# Patient Record
Sex: FEMALE | Race: WHITE | HISPANIC OR LATINO | Employment: FULL TIME | URBAN - METROPOLITAN AREA
[De-identification: names, ages, dates, MRNs, and addresses within clinical notes are randomized per-mention and may not be internally consistent; named-entity substitution may affect disease eponyms.]

---

## 2017-06-23 ENCOUNTER — INITIAL PRENATAL (OUTPATIENT)
Dept: OBSTETRICS AND GYNECOLOGY | Facility: CLINIC | Age: 22
End: 2017-06-23

## 2017-06-23 VITALS
BODY MASS INDEX: 41.15 KG/M2 | SYSTOLIC BLOOD PRESSURE: 98 MMHG | DIASTOLIC BLOOD PRESSURE: 63 MMHG | WEIGHT: 247 LBS | HEIGHT: 65 IN

## 2017-06-23 DIAGNOSIS — Z34.90 EARLY STAGE OF PREGNANCY: Primary | ICD-10-CM

## 2017-06-23 PROBLEM — Z86.19 HISTORY OF HERPES GENITALIS: Status: ACTIVE | Noted: 2017-06-23

## 2017-06-23 PROBLEM — O09.299 PREVIOUS STILLBIRTH OR DEMISE, ANTEPARTUM: Status: ACTIVE | Noted: 2017-06-23

## 2017-06-23 PROBLEM — O99.210 OBESITY COMPLICATING PREGNANCY: Status: ACTIVE | Noted: 2017-06-23

## 2017-06-23 PROCEDURE — 99203 OFFICE O/P NEW LOW 30 MIN: CPT | Performed by: OBSTETRICS & GYNECOLOGY

## 2017-06-23 RX ORDER — VITAMIN A ACETATE, BETA CAROTENE, ASCORBIC ACID, CHOLECALCIFEROL, .ALPHA.-TOCOPHEROL ACETATE, DL-, THIAMINE MONONITRATE, RIBOFLAVIN, NIACINAMIDE, PYRIDOXINE HYDROCHLORIDE, FOLIC ACID, CYANOCOBALAMIN, CALCIUM CARBONATE, FERROUS FUMARATE, ZINC OXIDE, CUPRIC OXIDE 3080; 12; 120; 400; 1; 1.84; 3; 20; 22; 920; 25; 200; 27; 10; 2 [IU]/1; UG/1; MG/1; [IU]/1; MG/1; MG/1; MG/1; MG/1; MG/1; [IU]/1; MG/1; MG/1; MG/1; MG/1; MG/1
TABLET, FILM COATED ORAL
COMMUNITY
Start: 2017-03-31 | End: 2018-02-19 | Stop reason: SDUPTHER

## 2017-06-23 NOTE — PROGRESS NOTES
CC:  Initial OB visit  Patient presents for initial OB visit.  History reviewed with her.  She has had 2 prior vaginal deliveries at term and denies any complications with those pregnancies.  Her most recent pregnancy resulted in a still birth in March 2017.  Patient is unsure exactly how far along she was but thinks that she was 25-26 weeks when the stillbirth was diagnosed.  She states that on an earlier ultrasound in that pregnancy they had seen blood in the fetuse's intestines.  She states that she had DNA testing for the fetus that was normal.  She was induced and had a vaginal delivery and denies any complications after that delivery.  She states that she had testing done on the placenta but never received those results.  All of her records from [facility] have been requested.  Patient has no major medical problems.  She does have a history of HSV but no recent outbreaks.  Discussed starting her on suppressive therapy at 36 weeks.  Initial OB counseling was done.  Patient counseled on hospital, call system, and diet during pregnancy.  Discussed cystic fibrosis screening and she is undecided.  A/P:  Supervision of early pregnancy  Previous stillbirth  --Labwork today  --Records from  requested  --Pap smear today  --Dating u/s next week  --F/U in 4 weeks  Counseling was given to patient for the following topics: instructions for management, risk factor reductions, prognosis, patient and family education and importance of treatment compliance . Total time of the encounter was 30 minutes and 20 minutes was spend counseling.

## 2017-06-24 LAB
ABO GROUP BLD: (no result)
AMPHETAMINES UR QL SCN: NEGATIVE NG/ML
BARBITURATES UR QL SCN: NEGATIVE NG/ML
BASOPHILS # BLD AUTO: 0 X10E3/UL (ref 0–0.2)
BASOPHILS NFR BLD AUTO: 0 %
BENZODIAZ UR QL: NEGATIVE NG/ML
BLD GP AB SCN SERPL QL: NEGATIVE
BZE UR QL: NEGATIVE NG/ML
CANNABINOIDS UR QL SCN: NEGATIVE NG/ML
EOSINOPHIL # BLD AUTO: 0.1 X10E3/UL (ref 0–0.4)
EOSINOPHIL NFR BLD AUTO: 2 %
ERYTHROCYTE [DISTWIDTH] IN BLOOD BY AUTOMATED COUNT: 13.9 % (ref 12.3–15.4)
HBV SURFACE AG SERPL QL IA: NEGATIVE
HCT VFR BLD AUTO: 36.1 % (ref 34–46.6)
HGB BLD-MCNC: 11.9 G/DL (ref 11.1–15.9)
HIV 1+2 AB+HIV1 P24 AG SERPL QL IA: NON REACTIVE
IMM GRANULOCYTES # BLD: 0 X10E3/UL (ref 0–0.1)
IMM GRANULOCYTES NFR BLD: 0 %
LYMPHOCYTES # BLD AUTO: 2.7 X10E3/UL (ref 0.7–3.1)
LYMPHOCYTES NFR BLD AUTO: 35 %
MCH RBC QN AUTO: 28.1 PG (ref 26.6–33)
MCHC RBC AUTO-ENTMCNC: 33 G/DL (ref 31.5–35.7)
MCV RBC AUTO: 85 FL (ref 79–97)
METHADONE UR QL SCN: NEGATIVE NG/ML
MONOCYTES # BLD AUTO: 0.5 X10E3/UL (ref 0.1–0.9)
MONOCYTES NFR BLD AUTO: 6 %
NEUTROPHILS # BLD AUTO: 4.4 X10E3/UL (ref 1.4–7)
NEUTROPHILS NFR BLD AUTO: 57 %
OPIATES UR QL: NEGATIVE NG/ML
PCP UR QL: NEGATIVE NG/ML
PLATELET # BLD AUTO: 271 X10E3/UL (ref 150–379)
PROPOXYPH UR QL: NEGATIVE NG/ML
RBC # BLD AUTO: 4.23 X10E6/UL (ref 3.77–5.28)
RH BLD: POSITIVE
RPR SER QL: NON REACTIVE
RUBV IGG SERPL IA-ACNC: 1.19 INDEX
WBC # BLD AUTO: 7.8 X10E3/UL (ref 3.4–10.8)

## 2017-06-25 LAB
BACTERIA UR CULT: NORMAL
BACTERIA UR CULT: NORMAL

## 2017-06-26 LAB
HGB A MFR BLD: 97.9 % (ref 94–98)
HGB A2 MFR BLD COLUMN CHROM: 2.1 % (ref 0.7–3.1)
HGB C MFR BLD: 0 %
HGB F MFR BLD: 0 % (ref 0–2)
HGB FRACT BLD-IMP: NORMAL
HGB S BLD QL SOLY: NEGATIVE
HGB S MFR BLD: 0 %

## 2017-06-27 ENCOUNTER — TELEPHONE (OUTPATIENT)
Dept: OBSTETRICS AND GYNECOLOGY | Facility: CLINIC | Age: 22
End: 2017-06-27

## 2017-06-27 ENCOUNTER — PROCEDURE VISIT (OUTPATIENT)
Dept: OBSTETRICS AND GYNECOLOGY | Facility: CLINIC | Age: 22
End: 2017-06-27

## 2017-06-27 DIAGNOSIS — Z36.89 ENCOUNTER TO ESTABLISH GESTATIONAL AGE USING ULTRASOUND: Primary | ICD-10-CM

## 2017-06-27 DIAGNOSIS — O99.211 OBESITY COMPLICATING PREGNANCY, FIRST TRIMESTER: ICD-10-CM

## 2017-06-27 PROCEDURE — 76817 TRANSVAGINAL US OBSTETRIC: CPT | Performed by: OBSTETRICS & GYNECOLOGY

## 2017-06-27 NOTE — TELEPHONE ENCOUNTER
Please let her know that I did receive them and reviewed them but her records just indicate that she had a spontaneous IUFD and no specific cause was identified.  We can discuss the results of her records at her next appointment.    ----- Message from Cynthia Reyer sent at 6/27/2017  2:55 PM EDT -----  Patient is in the office for her ultrasound. She is asking if we received the results of what happened when she lost her baby earlier this year. She said she was never told what might have happened. I explained I did see some records in the chart from [facility] & I would send you a message to see if you will review them. Thank you. # [phone]

## 2017-06-30 LAB
C TRACH RRNA CVX QL NAA+PROBE: NEGATIVE
CONV .: ABNORMAL
CYTOLOGIST CVX/VAG CYTO: ABNORMAL
CYTOLOGY CVX/VAG DOC THIN PREP: ABNORMAL
DX ICD CODE: ABNORMAL
DX ICD CODE: ABNORMAL
HIV 1 & 2 AB SER-IMP: ABNORMAL
HPV I/H RISK 4 DNA CVX QL PROBE+SIG AMP: NEGATIVE
N GONORRHOEA RRNA CVX QL NAA+PROBE: NEGATIVE
OTHER STN SPEC: ABNORMAL
PATH REPORT.FINAL DX SPEC: ABNORMAL
PATH REPORT.RELEVANT HX SPEC: ABNORMAL
PATHOLOGIST CVX/VAG CYTO: ABNORMAL
RECOM F/U CVX/VAG CYTO: ABNORMAL
STAT OF ADQ CVX/VAG CYTO-IMP: ABNORMAL

## 2017-07-24 ENCOUNTER — ROUTINE PRENATAL (OUTPATIENT)
Dept: OBSTETRICS AND GYNECOLOGY | Facility: CLINIC | Age: 22
End: 2017-07-24

## 2017-07-24 VITALS — SYSTOLIC BLOOD PRESSURE: 98 MMHG | BODY MASS INDEX: 40.6 KG/M2 | DIASTOLIC BLOOD PRESSURE: 66 MMHG | WEIGHT: 244 LBS

## 2017-07-24 DIAGNOSIS — O09.291: Primary | ICD-10-CM

## 2017-07-24 DIAGNOSIS — Z3A.10 10 WEEKS GESTATION OF PREGNANCY: ICD-10-CM

## 2017-07-24 PROCEDURE — 99213 OFFICE O/P EST LOW 20 MIN: CPT | Performed by: OBSTETRICS & GYNECOLOGY

## 2017-07-24 NOTE — PROGRESS NOTES
Pt cannot leave urine sample.  CC:  Pregnancy  Patient has no complaints today.  Reviewed prenatal lab work with her and records from [facility] with her.  Explained to her that no identifiable cause for her demise is specified in the records but it does appear she may have had a placental abruption.  Patient is requesting gender testing and explained to her that test is recommended for high risk patients and has a high rate of false positives in the low risk population.  She states that she understands this.  Discussed change in due date with her from her first trimester ultrasound and discussed Pap smear results with her.  A/P:  History of fetal demise, currently at 10 weeks  --Informaseq testing today  --Followup in 4 weeks  Counseling was given to patient for the following topics: diagnostic results, instructions for management and patient and family education . Total time of the encounter was 15 minutes and 10 minutes was spend counseling.

## 2017-07-30 LAB
# FETUSES US: NORMAL
CFDNA.FET/CFDNA.TOTAL SFR FETUS: 12.4 %
CHR X + Y ANEUP PLAS.CFDNA: NORMAL
CITATION REF LAB TEST: NORMAL
CYTOGENETICS STUDY: NORMAL
FET 13+18+21+X+Y ANEUP PLAS.CFDNA: NORMAL
FET CHR 13 TS PLAS.CFDNA QL: NORMAL
FET CHR 13 TS PLAS.CFDNA QL: NORMAL
FET CHR 18 TS PLAS.CFDNA QL: NORMAL
FET CHR 18 TS PLAS.CFDNA QL: NORMAL
FET CHR 21 TS PLAS.CFDNA QL: NORMAL
FET CHR 21 TS PLAS.CFDNA QL: NORMAL
FET CHROM X + Y ANEUP CFDNA IMP: NORMAL
GA: 10.1 WEEKS
GENETIC ALGORITHM SENSITIVITY: NORMAL %
LAB DIRECTOR NAME PROVIDER: NORMAL
Lab: NORMAL
REASON FOR REFERRAL (NARRATIVE): NORMAL
REF LAB TEST METHOD: NORMAL
SERVICE CMNT 02-IMP: NORMAL
SERVICE CMNT-IMP: NORMAL

## 2017-08-03 ENCOUNTER — TELEPHONE (OUTPATIENT)
Dept: OBSTETRICS AND GYNECOLOGY | Facility: CLINIC | Age: 22
End: 2017-08-03

## 2017-08-03 NOTE — TELEPHONE ENCOUNTER
----- Message from Ira Worley MA sent at 8/3/2017 10:58 AM EDT -----  L.m for pt/lolly  ----- Message -----     From: rIa Worley MA     Sent: 8/2/2017   8:59 AM       To: VANESSA Mckeonm for pt/lolly  ----- Message -----     From: Ira Worley MA     Sent: 8/1/2017  11:08 AM       To: Ira Worley MA L.m for pt/lolly  ----- Message -----     From: Nnamdi Quintero MD     Sent: 7/31/2017   5:00 PM       To: VANESSA Mckeon, let her know the down syndrome  Test was negative/Normal. Appears female. Thanks Dr. Quintero

## 2017-08-06 ENCOUNTER — APPOINTMENT (OUTPATIENT)
Dept: ULTRASOUND IMAGING | Facility: HOSPITAL | Age: 22
End: 2017-08-06

## 2017-08-06 ENCOUNTER — HOSPITAL ENCOUNTER (EMERGENCY)
Facility: HOSPITAL | Age: 22
Discharge: HOME | End: 2017-08-06
Attending: EMERGENCY MEDICINE | Admitting: EMERGENCY MEDICINE

## 2017-08-06 VITALS
DIASTOLIC BLOOD PRESSURE: 64 MMHG | BODY MASS INDEX: 39.99 KG/M2 | WEIGHT: 240 LBS | HEIGHT: 65 IN | RESPIRATION RATE: 16 BRPM | SYSTOLIC BLOOD PRESSURE: 108 MMHG | OXYGEN SATURATION: 97 % | HEART RATE: 75 BPM | TEMPERATURE: 98.4 F

## 2017-08-06 DIAGNOSIS — O21.9 NAUSEA AND VOMITING IN PREGNANCY: ICD-10-CM

## 2017-08-06 DIAGNOSIS — R10.2 PELVIC PAIN AFFECTING PREGNANCY: Primary | ICD-10-CM

## 2017-08-06 DIAGNOSIS — O26.899 PELVIC PAIN AFFECTING PREGNANCY: Primary | ICD-10-CM

## 2017-08-06 LAB
ANION GAP SERPL CALCULATED.3IONS-SCNC: 12.1 MMOL/L
BASOPHILS # BLD AUTO: 0.01 10*3/MM3 (ref 0–0.2)
BASOPHILS NFR BLD AUTO: 0.1 % (ref 0–1.5)
BUN BLD-MCNC: 6 MG/DL (ref 6–20)
BUN/CREAT SERPL: 9 (ref 7–25)
CALCIUM SPEC-SCNC: 9.4 MG/DL (ref 8.6–10.5)
CHLORIDE SERPL-SCNC: 103 MMOL/L (ref 98–107)
CO2 SERPL-SCNC: 21.9 MMOL/L (ref 22–29)
CREAT BLD-MCNC: 0.67 MG/DL (ref 0.57–1)
DEPRECATED RDW RBC AUTO: 48.9 FL (ref 37–54)
EOSINOPHIL # BLD AUTO: 0.08 10*3/MM3 (ref 0–0.7)
EOSINOPHIL NFR BLD AUTO: 0.8 % (ref 0.3–6.2)
ERYTHROCYTE [DISTWIDTH] IN BLOOD BY AUTOMATED COUNT: 15.1 % (ref 11.7–13)
GFR SERPL CREATININE-BSD FRML MDRD: 131 ML/MIN/1.73
GLUCOSE BLD-MCNC: 92 MG/DL (ref 65–99)
HCG INTACT+B SERPL-ACNC: NORMAL MIU/ML
HCT VFR BLD AUTO: 38.1 % (ref 35.6–45.5)
HGB BLD-MCNC: 12.3 G/DL (ref 11.9–15.5)
HOLD SPECIMEN: NORMAL
HOLD SPECIMEN: NORMAL
IMM GRANULOCYTES # BLD: 0 10*3/MM3 (ref 0–0.03)
IMM GRANULOCYTES NFR BLD: 0 % (ref 0–0.5)
LYMPHOCYTES # BLD AUTO: 2.93 10*3/MM3 (ref 0.9–4.8)
LYMPHOCYTES NFR BLD AUTO: 30.6 % (ref 19.6–45.3)
MCH RBC QN AUTO: 28.3 PG (ref 26.9–32)
MCHC RBC AUTO-ENTMCNC: 32.3 G/DL (ref 32.4–36.3)
MCV RBC AUTO: 87.8 FL (ref 80.5–98.2)
MONOCYTES # BLD AUTO: 0.58 10*3/MM3 (ref 0.2–1.2)
MONOCYTES NFR BLD AUTO: 6.1 % (ref 5–12)
NEUTROPHILS # BLD AUTO: 5.97 10*3/MM3 (ref 1.9–8.1)
NEUTROPHILS NFR BLD AUTO: 62.4 % (ref 42.7–76)
PLATELET # BLD AUTO: 243 10*3/MM3 (ref 140–500)
PMV BLD AUTO: 11.1 FL (ref 6–12)
POTASSIUM BLD-SCNC: 4 MMOL/L (ref 3.5–5.2)
RBC # BLD AUTO: 4.34 10*6/MM3 (ref 3.9–5.2)
SODIUM BLD-SCNC: 137 MMOL/L (ref 136–145)
WBC NRBC COR # BLD: 9.57 10*3/MM3 (ref 4.5–10.7)
WHOLE BLOOD HOLD SPECIMEN: NORMAL
WHOLE BLOOD HOLD SPECIMEN: NORMAL

## 2017-08-06 PROCEDURE — 99284 EMERGENCY DEPT VISIT MOD MDM: CPT

## 2017-08-06 PROCEDURE — 84702 CHORIONIC GONADOTROPIN TEST: CPT

## 2017-08-06 PROCEDURE — 76817 TRANSVAGINAL US OBSTETRIC: CPT

## 2017-08-06 PROCEDURE — 93976 VASCULAR STUDY: CPT

## 2017-08-06 PROCEDURE — 76801 OB US < 14 WKS SINGLE FETUS: CPT

## 2017-08-06 PROCEDURE — 36415 COLL VENOUS BLD VENIPUNCTURE: CPT

## 2017-08-06 PROCEDURE — 85025 COMPLETE CBC W/AUTO DIFF WBC: CPT

## 2017-08-06 PROCEDURE — 80048 BASIC METABOLIC PNL TOTAL CA: CPT

## 2017-08-06 RX ORDER — SODIUM CHLORIDE 0.9 % (FLUSH) 0.9 %
10 SYRINGE (ML) INJECTION AS NEEDED
Status: DISCONTINUED | OUTPATIENT
Start: 2017-08-06 | End: 2017-08-06 | Stop reason: HOSPADM

## 2017-08-06 RX ORDER — ONDANSETRON 4 MG/1
4 TABLET, ORALLY DISINTEGRATING ORAL ONCE
Status: COMPLETED | OUTPATIENT
Start: 2017-08-06 | End: 2017-08-06

## 2017-08-06 RX ADMIN — ONDANSETRON 4 MG: 4 TABLET, ORALLY DISINTEGRATING ORAL at 17:21

## 2017-08-06 NOTE — ED PROVIDER NOTES
EMERGENCY DEPARTMENT ENCOUNTER    CHIEF COMPLAINT  Chief Complaint: LLQ abd pain  History given by: patient   History limited by: nothing   Room Number:   PMD: Provider Not In System  OBGYN: Dr. Sims     HPI:  Pt is a 22 y.o. female who is approximately 12 weeks pregnant presents complaining of sharp and cramping LLQ abd pain intermittently for 4 days. The pain is exacerbated by lying on her left side. Pt also complains of nausea, vomiting, and weight loss, but she denies fever, chest pain, SOA, diarrhea, blood in stool, and any other sx at this time. Pt estimates that she has vomited 7 times today. She has chronic low back pain secondary to prior L5 fracture. Pt's LMP was some time in May. Her due date is 18. Her OBGYN is Dr. Godwin. There have been no complications with this pregnancy thus far.  A1.    Duration:  4 days  Timing: intermittent   Location: LLQ abd  Radiation: does not radiate   Quality: sharp and cramping   Intensity/Severity: moderate   Progression: unchanged   Associated Symptoms: nausea, vomiting, weight loss  Aggravating Factors: lying on left side   Alleviating Factors: none specified   Previous Episodes: none specified   Treatment before arrival: none specified     PAST MEDICAL HISTORY  Active Ambulatory Problems     Diagnosis Date Noted   • Obesity complicating pregnancy 2017   • History of herpes genitalis 2017   • Previous stillbirth or demise, antepartum 2017     Resolved Ambulatory Problems     Diagnosis Date Noted   • No Resolved Ambulatory Problems     Past Medical History:   Diagnosis Date   • Asthma    • Herpes simplex        PAST SURGICAL HISTORY  History reviewed. No pertinent surgical history.    FAMILY HISTORY  History reviewed. No pertinent family history.    SOCIAL HISTORY  Social History     Social History   • Marital status: Single     Spouse name: N/A   • Number of children: N/A   • Years of education: N/A     Occupational History   • Not on  file.     Social History Main Topics   • Smoking status: Never Smoker   • Smokeless tobacco: Not on file   • Alcohol use No   • Drug use: No   • Sexual activity: Yes     Partners: Male     Birth control/ protection: None     Other Topics Concern   • Not on file     Social History Narrative   • No narrative on file       ALLERGIES  Apricot flavor and Tegaderm ag mesh [silver]    REVIEW OF SYSTEMS  Review of Systems   Constitutional: Positive for unexpected weight change (loss).   HENT: Negative.    Eyes: Negative.    Respiratory: Negative.    Cardiovascular: Negative.    Gastrointestinal: Positive for abdominal pain, nausea and vomiting.   Genitourinary: Negative.    Musculoskeletal: Positive for back pain (chronic).   Skin: Negative.    Neurological: Negative.    Psychiatric/Behavioral: Negative.        PHYSICAL EXAM  ED Triage Vitals   Temp Heart Rate Resp BP SpO2   08/06/17 1417 08/06/17 1417 08/06/17 1417 08/06/17 1420 08/06/17 1417   98.4 °F (36.9 °C) 104 18 111/93 98 %      Temp src Heart Rate Source Patient Position BP Location FiO2 (%)   -- -- 08/06/17 1420 08/06/17 1420 --     Standing Right arm        Physical Exam   Constitutional: She is oriented to person, place, and time and well-developed, well-nourished, and in no distress. No distress.   HENT:   Head: Normocephalic and atraumatic.   Eyes: EOM are normal. Pupils are equal, round, and reactive to light.   Neck: Normal range of motion. Neck supple.   Cardiovascular: Normal rate, regular rhythm and normal heart sounds.    Pulmonary/Chest: Effort normal and breath sounds normal. No respiratory distress.   Abdominal: Soft. There is tenderness (moderate) in the left lower quadrant. There is no rebound and no guarding.   Musculoskeletal: Normal range of motion. She exhibits no edema.   Neurological: She is alert and oriented to person, place, and time. She has normal sensation and normal strength.   Skin: Skin is warm and dry. No rash noted.   Psychiatric:  Mood and affect normal.   Nursing note and vitals reviewed.      LAB RESULTS  Lab Results (last 24 hours)     Procedure Component Value Units Date/Time    CBC & Differential [[id number]] Collected:  08/06/17 1446    Specimen:  Blood Updated:  08/06/17 1502    Narrative:       The following orders were created for panel order CBC & Differential.  Procedure                               Abnormality         Status                     ---------                               -----------         ------                     CBC Auto Differential[[id number]]        Abnormal            Final result                 Please view results for these tests on the individual orders.    Basic Metabolic Panel [[id number]]  (Abnormal) Collected:  08/06/17 1446    Specimen:  Blood Updated:  08/06/17 1519     Glucose 92 mg/dL      BUN 6 mg/dL      Creatinine 0.67 mg/dL      Sodium 137 mmol/L      Potassium 4.0 mmol/L      Chloride 103 mmol/L      CO2 21.9 (L) mmol/L      Calcium 9.4 mg/dL      eGFR  African Amer 131 mL/min/1.73      BUN/Creatinine Ratio 9.0     Anion Gap 12.1 mmol/L     Narrative:       GFR Normal >60  Chronic Kidney Disease <60  Kidney Failure <15    hCG, Quantitative, Pregnancy [[id number]] Collected:  08/06/17 1446    Specimen:  Blood Updated:  08/06/17 1535     HCG Quantitative 91362.00 mIU/mL     Narrative:       HCG Ranges by Gestational Age    3 Weeks         5.4 -      72 mIU/mL  4 Weeks        10.2 -     708 mIU/mL  5 Weeks       217   -   8,245 mIU/mL  6 Weeks       152   -  32,177 mIU/mL  7 Weeks     4,059   - 153,767 mIU/mL  8 Weeks    31,366   - 149,094 mIU/mL  9 Weeks    59,109   - 135,901 mIU/mL  10 Weeks   44,186   - 170,409 mIU/mL  12 Weeks   27,107   - 201,615 mIU/mL  14 Weeks   24,302   -  93,646 mIU/mL  15 Weeks   12,540   -  69,747 mIU/mL  16 Weeks    8,904   -  55,332 mIU/mL  17 Weeks    8,240   -  51,793 mIU/mL  18 Weeks    9,649   -  55,271 mIU/mL    CBC Auto Differential [[id number]]  (Abnormal)  Collected:  08/06/17 1446    Specimen:  Blood Updated:  08/06/17 1502     WBC 9.57 10*3/mm3      RBC 4.34 10*6/mm3      Hemoglobin 12.3 g/dL      Hematocrit 38.1 %      MCV 87.8 fL      MCH 28.3 pg      MCHC 32.3 (L) g/dL      RDW 15.1 (H) %      RDW-SD 48.9 fl      MPV 11.1 fL      Platelets 243 10*3/mm3      Neutrophil % 62.4 %      Lymphocyte % 30.6 %      Monocyte % 6.1 %      Eosinophil % 0.8 %      Basophil % 0.1 %      Immature Grans % 0.0 %      Neutrophils, Absolute 5.97 10*3/mm3      Lymphocytes, Absolute 2.93 10*3/mm3      Monocytes, Absolute 0.58 10*3/mm3      Eosinophils, Absolute 0.08 10*3/mm3      Basophils, Absolute 0.01 10*3/mm3      Immature Grans, Absolute 0.00 10*3/mm3           I ordered the above labs and reviewed the results      RADIOLOGY  US Ob < 14 Weeks Single or First Gestation         US Ob Transvaginal    (Results Pending)   US Testicular or Ovarian Vascular Limited    (Results Pending)      US shows 12 week 4 day pregnancy without complications.     I ordered the above noted radiological studies. Interpreted by radiologist. Reviewed by me in PACS.     PROCEDURES  Procedures      PROGRESS AND CONSULTS  ED Course     1615: I have reviewed labs ordered by triage.     1706: Ordered US for further evaluation of pregnancy and Zofran for nausea.     1841: Rechecked pt. Pt is resting comfortably. Discussed unremarkable workup. I encouraged her to take Tylenol PRN for pain. Pt will receive work note. She will f/u with Dr. Sims if sx do not improve. Pt understands and agrees, and all questions were addressed.     MEDICAL DECISION MAKING  Results were reviewed/discussed with the patient and they were also made aware of online access. Pt also made aware that some labs, such as cultures, will not be resulted during ER visit and follow up with PMD is necessary.     MDM  Number of Diagnoses or Management Options     Amount and/or Complexity of Data Reviewed  Clinical lab tests: reviewed and  ordered  Tests in the radiology section of CPT®: ordered and reviewed    Patient Progress  Patient progress: stable         DIAGNOSIS  Final diagnoses:   Pelvic pain affecting pregnancy   Nausea and vomiting in pregnancy       DISPOSITION  DISCHARGE    Patient discharged in stable condition.    Reviewed implications of results, diagnosis, meds, responsibility to follow up, warning signs and symptoms of possible worsening, potential complications and reasons to return to ER, including worsening of sx.    Patient/Family voiced understanding of above instructions.    Discussed plan for discharge, as there is no emergent indication for admission.  Pt/family is agreeable and understands need for follow up and repeat testing.  Pt is aware that discharge does not mean that nothing is wrong but it indicates no emergency is present that requires admission and they must continue care with follow-up as given below or physician of their choice.     FOLLOW-UP  Padmini Sims MD  [address] [address]  [phone]    In 3 days  If Not Better         Medication List      Notice     No changes were made to your prescriptions during this visit.        Latest Documented Vital Signs:  As of 6:42 PM  BP- 96/50 HR- 63 Temp- 98.4 °F (36.9 °C) O2 sat- 97%    --  Documentation assistance provided by idris Lincoln for Jv Waters.  Information recorded by the scribe was done at my direction and has been verified and validated by me.         Chrissy Lincoln  08/06/17 [id number]       Jv Waters MD  08/06/17 2158

## 2017-08-21 ENCOUNTER — ROUTINE PRENATAL (OUTPATIENT)
Dept: OBSTETRICS AND GYNECOLOGY | Facility: CLINIC | Age: 22
End: 2017-08-21

## 2017-08-21 VITALS — WEIGHT: 243 LBS | BODY MASS INDEX: 40.44 KG/M2 | DIASTOLIC BLOOD PRESSURE: 69 MMHG | SYSTOLIC BLOOD PRESSURE: 104 MMHG

## 2017-08-21 DIAGNOSIS — Z34.82 ENCOUNTER FOR SUPERVISION OF OTHER NORMAL PREGNANCY IN SECOND TRIMESTER: Primary | ICD-10-CM

## 2017-08-21 PROCEDURE — 99213 OFFICE O/P EST LOW 20 MIN: CPT | Performed by: OBSTETRICS & GYNECOLOGY

## 2017-08-21 NOTE — PROGRESS NOTES
CC:  Pregnancy  Patient has no complaints today.  Was seen in Ed several weeks ago for lower pelvic pain.  Patient states pain has resolved and was told in ED it was growing pains.  No bleeding.  Reviewed cell free DNA test results with her.  Discussed anatomy u/s at 19-20 weeks.  A/P:  Supervision of normal pregnancy at 14 weeks  --F/U in 4 weeks  Counseling was given to patient for the following topics: diagnostic results, instructions for management and patient and family education . Total time of the encounter was 15 minutes and 10 minutes was spend counseling.

## 2017-09-18 ENCOUNTER — ROUTINE PRENATAL (OUTPATIENT)
Dept: OBSTETRICS AND GYNECOLOGY | Facility: CLINIC | Age: 22
End: 2017-09-18

## 2017-09-18 VITALS — SYSTOLIC BLOOD PRESSURE: 104 MMHG | DIASTOLIC BLOOD PRESSURE: 60 MMHG | BODY MASS INDEX: 41.67 KG/M2 | WEIGHT: 250.4 LBS

## 2017-09-18 DIAGNOSIS — Z3A.18 18 WEEKS GESTATION OF PREGNANCY: ICD-10-CM

## 2017-09-18 DIAGNOSIS — O26.899 CARPAL TUNNEL SYNDROME DURING PREGNANCY: Primary | ICD-10-CM

## 2017-09-18 DIAGNOSIS — G56.00 CARPAL TUNNEL SYNDROME DURING PREGNANCY: Primary | ICD-10-CM

## 2017-09-18 PROCEDURE — 99213 OFFICE O/P EST LOW 20 MIN: CPT | Performed by: OBSTETRICS & GYNECOLOGY

## 2017-09-18 NOTE — PROGRESS NOTES
CC:  Pregnancy  Patient complaining of both hands going numb at night and making it difficult to sleep.  Discussed with her that this is related to carpal tunnel syndrome and is normal in pregnancy.  Advised wrist splints and may use benadryl to help her sleep.  Patient upset that she's not having anatomy u/s today.  Explained to her that it will be done in two weeks and reasoning to why it is done closer to 20 weeks.  Offered msafp testing but she declines.  A/P:  Carpal tunnel syndrome in pregnancy at 18 weeks  --F/U in 2 weeks with anatomy u/s  Counseling was given to patient for the following topics: instructions for management, prognosis, patient and family education and risks and benefits of treatment options . Total time of the encounter was 15 minutes and 10 minutes was spend counseling.

## 2017-10-23 ENCOUNTER — PROCEDURE VISIT (OUTPATIENT)
Dept: OBSTETRICS AND GYNECOLOGY | Facility: CLINIC | Age: 22
End: 2017-10-23

## 2017-10-23 ENCOUNTER — ROUTINE PRENATAL (OUTPATIENT)
Dept: OBSTETRICS AND GYNECOLOGY | Facility: CLINIC | Age: 22
End: 2017-10-23

## 2017-10-23 VITALS — BODY MASS INDEX: 43.27 KG/M2 | WEIGHT: 260 LBS | DIASTOLIC BLOOD PRESSURE: 70 MMHG | SYSTOLIC BLOOD PRESSURE: 106 MMHG

## 2017-10-23 DIAGNOSIS — Z34.82 ENCOUNTER FOR SUPERVISION OF OTHER NORMAL PREGNANCY IN SECOND TRIMESTER: Primary | ICD-10-CM

## 2017-10-23 DIAGNOSIS — O99.212 OBESITY AFFECTING PREGNANCY IN SECOND TRIMESTER: ICD-10-CM

## 2017-10-23 DIAGNOSIS — Z36.3 ANTENATAL SCREENING FOR MALFORMATION USING ULTRASONICS: Primary | ICD-10-CM

## 2017-10-23 PROCEDURE — 76805 OB US >/= 14 WKS SNGL FETUS: CPT | Performed by: OBSTETRICS & GYNECOLOGY

## 2017-10-23 PROCEDURE — 99213 OFFICE O/P EST LOW 20 MIN: CPT | Performed by: OBSTETRICS & GYNECOLOGY

## 2017-10-23 PROCEDURE — 90471 IMMUNIZATION ADMIN: CPT | Performed by: OBSTETRICS & GYNECOLOGY

## 2017-10-23 PROCEDURE — 90656 IIV3 VACC NO PRSV 0.5 ML IM: CPT | Performed by: OBSTETRICS & GYNECOLOGY
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
## 2017-11-10 ENCOUNTER — HOSPITAL ENCOUNTER (EMERGENCY)
Facility: HOSPITAL | Age: 22
Discharge: HOME | End: 2017-11-10
Attending: FAMILY MEDICINE | Admitting: FAMILY MEDICINE

## 2017-11-10 VITALS
DIASTOLIC BLOOD PRESSURE: 80 MMHG | BODY MASS INDEX: 41.65 KG/M2 | SYSTOLIC BLOOD PRESSURE: 115 MMHG | OXYGEN SATURATION: 100 % | WEIGHT: 250 LBS | TEMPERATURE: 97.8 F | RESPIRATION RATE: 18 BRPM | HEIGHT: 65 IN | HEART RATE: 105 BPM

## 2017-11-10 DIAGNOSIS — R19.7 VOMITING AND DIARRHEA: Primary | ICD-10-CM

## 2017-11-10 DIAGNOSIS — R11.10 VOMITING AND DIARRHEA: Primary | ICD-10-CM

## 2017-11-10 LAB
ALBUMIN SERPL-MCNC: 3.2 G/DL (ref 3.5–5.2)
ALBUMIN/GLOB SERPL: 0.9 G/DL
ALP SERPL-CCNC: 63 U/L (ref 39–117)
ALT SERPL W P-5'-P-CCNC: 9 U/L (ref 1–33)
ANION GAP SERPL CALCULATED.3IONS-SCNC: 10.3 MMOL/L
AST SERPL-CCNC: 11 U/L (ref 1–32)
BASOPHILS # BLD AUTO: 0.01 10*3/MM3 (ref 0–0.2)
BASOPHILS NFR BLD AUTO: 0.1 % (ref 0–1.5)
BILIRUB SERPL-MCNC: 0.2 MG/DL (ref 0.1–1.2)
BUN BLD-MCNC: 6 MG/DL (ref 6–20)
BUN/CREAT SERPL: 8.3 (ref 7–25)
CALCIUM SPEC-SCNC: 9.2 MG/DL (ref 8.6–10.5)
CHLORIDE SERPL-SCNC: 103 MMOL/L (ref 98–107)
CO2 SERPL-SCNC: 25.7 MMOL/L (ref 22–29)
CREAT BLD-MCNC: 0.72 MG/DL (ref 0.57–1)
DEPRECATED RDW RBC AUTO: 45.1 FL (ref 37–54)
EOSINOPHIL # BLD AUTO: 0.32 10*3/MM3 (ref 0–0.7)
EOSINOPHIL NFR BLD AUTO: 3.5 % (ref 0.3–6.2)
ERYTHROCYTE [DISTWIDTH] IN BLOOD BY AUTOMATED COUNT: 13.4 % (ref 11.7–13)
GFR SERPL CREATININE-BSD FRML MDRD: 121 ML/MIN/1.73
GLOBULIN UR ELPH-MCNC: 3.7 GM/DL
GLUCOSE BLD-MCNC: 73 MG/DL (ref 65–99)
HCT VFR BLD AUTO: 33.8 % (ref 35.6–45.5)
HGB BLD-MCNC: 11.1 G/DL (ref 11.9–15.5)
IMM GRANULOCYTES # BLD: 0.02 10*3/MM3 (ref 0–0.03)
IMM GRANULOCYTES NFR BLD: 0.2 % (ref 0–0.5)
LYMPHOCYTES # BLD AUTO: 1.75 10*3/MM3 (ref 0.9–4.8)
LYMPHOCYTES NFR BLD AUTO: 19.4 % (ref 19.6–45.3)
MCH RBC QN AUTO: 30.2 PG (ref 26.9–32)
MCHC RBC AUTO-ENTMCNC: 32.8 G/DL (ref 32.4–36.3)
MCV RBC AUTO: 92.1 FL (ref 80.5–98.2)
MONOCYTES # BLD AUTO: 0.91 10*3/MM3 (ref 0.2–1.2)
MONOCYTES NFR BLD AUTO: 10.1 % (ref 5–12)
NEUTROPHILS # BLD AUTO: 6.01 10*3/MM3 (ref 1.9–8.1)
NEUTROPHILS NFR BLD AUTO: 66.7 % (ref 42.7–76)
PLATELET # BLD AUTO: 198 10*3/MM3 (ref 140–500)
PMV BLD AUTO: 10.8 FL (ref 6–12)
POTASSIUM BLD-SCNC: 4.2 MMOL/L (ref 3.5–5.2)
PROT SERPL-MCNC: 6.9 G/DL (ref 6–8.5)
RBC # BLD AUTO: 3.67 10*6/MM3 (ref 3.9–5.2)
SODIUM BLD-SCNC: 139 MMOL/L (ref 136–145)
WBC NRBC COR # BLD: 9.02 10*3/MM3 (ref 4.5–10.7)

## 2017-11-10 PROCEDURE — 96375 TX/PRO/DX INJ NEW DRUG ADDON: CPT

## 2017-11-10 PROCEDURE — 96374 THER/PROPH/DIAG INJ IV PUSH: CPT

## 2017-11-10 PROCEDURE — 25010000002 ONDANSETRON PER 1 MG: Performed by: FAMILY MEDICINE

## 2017-11-10 PROCEDURE — 99283 EMERGENCY DEPT VISIT LOW MDM: CPT

## 2017-11-10 PROCEDURE — 25010000002 METOCLOPRAMIDE PER 10 MG: Performed by: FAMILY MEDICINE

## 2017-11-10 PROCEDURE — 85025 COMPLETE CBC W/AUTO DIFF WBC: CPT | Performed by: FAMILY MEDICINE

## 2017-11-10 PROCEDURE — 96361 HYDRATE IV INFUSION ADD-ON: CPT

## 2017-11-10 PROCEDURE — 80053 COMPREHEN METABOLIC PANEL: CPT | Performed by: FAMILY MEDICINE

## 2017-11-10 RX ORDER — METOCLOPRAMIDE HYDROCHLORIDE 5 MG/ML
10 INJECTION INTRAMUSCULAR; INTRAVENOUS ONCE
Status: COMPLETED | OUTPATIENT
Start: 2017-11-10 | End: 2017-11-10

## 2017-11-10 RX ORDER — SODIUM CHLORIDE 9 MG/ML
125 INJECTION, SOLUTION INTRAVENOUS CONTINUOUS
Status: DISCONTINUED | OUTPATIENT
Start: 2017-11-10 | End: 2017-11-10 | Stop reason: HOSPADM

## 2017-11-10 RX ORDER — ONDANSETRON 4 MG/1
4 TABLET, FILM COATED ORAL EVERY 4 HOURS PRN
Qty: 4 TABLET | Refills: 0 | Status: SHIPPED | OUTPATIENT
Start: 2017-11-10 | End: 2017-11-27

## 2017-11-10 RX ORDER — ONDANSETRON 2 MG/ML
4 INJECTION INTRAMUSCULAR; INTRAVENOUS ONCE
Status: COMPLETED | OUTPATIENT
Start: 2017-11-10 | End: 2017-11-10

## 2017-11-10 RX ADMIN — ONDANSETRON 4 MG: 2 INJECTION INTRAMUSCULAR; INTRAVENOUS at 14:08

## 2017-11-10 RX ADMIN — METOCLOPRAMIDE 10 MG: 5 INJECTION, SOLUTION INTRAMUSCULAR; INTRAVENOUS at 13:03

## 2017-11-10 RX ADMIN — SODIUM CHLORIDE 1000 ML: 9 INJECTION, SOLUTION INTRAVENOUS at 13:04

## 2017-11-10 RX ADMIN — SODIUM CHLORIDE 125 ML/HR: 9 INJECTION, SOLUTION INTRAVENOUS at 14:08

## 2017-11-10 NOTE — ED PROVIDER NOTES
" EMERGENCY DEPARTMENT ENCOUNTER    CHIEF COMPLAINT  Chief Complaint: vomiting  History given by: Patient  History limited by: none  Room Number: 18/18  PMD: Provider Not In System  Dr. Temple, OB/GYN    HPI:  Pt is a 22 y.o. female who is 26 wks gestation 4 grav 3 para 1 ab. She c/o diarrhea, body aches and chills, cough (X 1 day), and vomiting (4 episodes). Sx began with rhinorrhea a few days ago. She denies abd pain or cramping, wheezing, or SOA. Pt was sent to the ED to rule out pneumonia from her OB/GYN.    Duration:\" a few days\"   Onset: gradual  Timing: constant  Location: n/a  Radiation: n/a  Quality: n/a  Intensity/Severity: moderate  Progression: unchanged  Associated Symptoms: diarrhea, body aches and chills, cough.  Aggravating Factors: none  Alleviating Factors: none  Previous Episodes: none  Treatment before arrival: Seen by OBGYN    PAST MEDICAL HISTORY  Active Ambulatory Problems     Diagnosis Date Noted   • Obesity complicating pregnancy 06/23/2017   • History of herpes genitalis 06/23/2017   • Previous stillbirth or demise, antepartum 06/23/2017     Resolved Ambulatory Problems     Diagnosis Date Noted   • No Resolved Ambulatory Problems     Past Medical History:   Diagnosis Date   • Asthma    • Herpes simplex        PAST SURGICAL HISTORY  History reviewed. No pertinent surgical history.    FAMILY HISTORY  History reviewed. No pertinent family history.    SOCIAL HISTORY  Social History     Social History   • Marital status: Single     Spouse name: N/A   • Number of children: N/A   • Years of education: N/A     Occupational History   • Not on file.     Social History Main Topics   • Smoking status: Never Smoker   • Smokeless tobacco: Not on file   • Alcohol use No   • Drug use: No   • Sexual activity: Defer     Other Topics Concern   • Not on file     Social History Narrative   • No narrative on file       ALLERGIES  Apricot flavor; Azo [phenazopyridine]; and Tegaderm ag mesh [silver]    REVIEW OF " SYSTEMS  Review of Systems   Constitutional: Positive for chills. Negative for fever.   HENT: Positive for rhinorrhea. Negative for sore throat.    Eyes: Negative.    Respiratory: Positive for cough. Negative for shortness of breath and wheezing.    Cardiovascular: Negative for chest pain.   Gastrointestinal: Positive for diarrhea, nausea and vomiting. Negative for abdominal pain.   Genitourinary: Negative for dysuria.   Musculoskeletal: Negative for neck pain.   Skin: Negative for rash.   Allergic/Immunologic: Negative.    Neurological: Negative for weakness, numbness and headaches.   Hematological: Negative.    Psychiatric/Behavioral: Negative.    All other systems reviewed and are negative.      PHYSICAL EXAM  ED Triage Vitals   Temp Heart Rate Resp BP SpO2   11/10/17 1147 11/10/17 1147 11/10/17 1157 11/10/17 1157 11/10/17 1147   97.8 °F (36.6 °C) 105 18 122/69 95 %      Temp src Heart Rate Source Patient Position BP Location FiO2 (%)   -- -- -- -- --              Physical Exam   Constitutional: She is oriented to person, place, and time and well-developed, well-nourished, and in no distress. No distress.   HENT:   Head: Normocephalic and atraumatic.   Eyes: EOM are normal. Pupils are equal, round, and reactive to light.   Neck: Normal range of motion. Neck supple.   Cardiovascular: Normal rate, regular rhythm and normal heart sounds.    Pulmonary/Chest: Effort normal and breath sounds normal. No respiratory distress.   Abdominal: Soft. There is no tenderness. There is no rebound and no guarding.   Gravid uterus w/o tenderness   Musculoskeletal: Normal range of motion. She exhibits no edema.   Neurological: She is alert and oriented to person, place, and time. She has normal sensation and normal strength.   Skin: Skin is warm and dry. No rash noted.   Psychiatric: Mood and affect normal.   Nursing note and vitals reviewed.      LAB RESULTS  Lab Results (last 24 hours)     Procedure Component Value Units Date/Time     CBC & Differential [[id number]] Collected:  11/10/17 1305    Specimen:  Blood Updated:  11/10/17 1316    Narrative:       The following orders were created for panel order CBC & Differential.  Procedure                               Abnormality         Status                     ---------                               -----------         ------                     CBC Auto Differential[[id number]]        Abnormal            Final result                 Please view results for these tests on the individual orders.    Comprehensive Metabolic Panel [[id number]]  (Abnormal) Collected:  11/10/17 1305    Specimen:  Blood Updated:  11/10/17 1338     Glucose 73 mg/dL      BUN 6 mg/dL      Creatinine 0.72 mg/dL      Sodium 139 mmol/L      Potassium 4.2 mmol/L      Chloride 103 mmol/L      CO2 25.7 mmol/L      Calcium 9.2 mg/dL      Total Protein 6.9 g/dL      Albumin 3.20 (L) g/dL      ALT (SGPT) 9 U/L      AST (SGOT) 11 U/L      Alkaline Phosphatase 63 U/L      Total Bilirubin 0.2 mg/dL      eGFR  African Amer 121 mL/min/1.73      Globulin 3.7 gm/dL      A/G Ratio 0.9 g/dL      BUN/Creatinine Ratio 8.3     Anion Gap 10.3 mmol/L     CBC Auto Differential [[id number]]  (Abnormal) Collected:  11/10/17 1305    Specimen:  Blood Updated:  11/10/17 1316     WBC 9.02 10*3/mm3      RBC 3.67 (L) 10*6/mm3      Hemoglobin 11.1 (L) g/dL      Hematocrit 33.8 (L) %      MCV 92.1 fL      MCH 30.2 pg      MCHC 32.8 g/dL      RDW 13.4 (H) %      RDW-SD 45.1 fl      MPV 10.8 fL      Platelets 198 10*3/mm3      Neutrophil % 66.7 %      Lymphocyte % 19.4 (L) %      Monocyte % 10.1 %      Eosinophil % 3.5 %      Basophil % 0.1 %      Immature Grans % 0.2 %      Neutrophils, Absolute 6.01 10*3/mm3      Lymphocytes, Absolute 1.75 10*3/mm3      Monocytes, Absolute 0.91 10*3/mm3      Eosinophils, Absolute 0.32 10*3/mm3      Basophils, Absolute 0.01 10*3/mm3      Immature Grans, Absolute 0.02 10*3/mm3           I ordered the above labs and reviewed  the results    RADIOLOGY  No orders to display        I ordered the above noted radiological studies. Interpreted by radiologist. Reviewed by me in PACS.       PROCEDURES  Procedures      PROGRESS AND CONSULTS  ED Course   1241  CBC, CMP ordered. IVF ordered for hydration. Reglan ordered for nausea.    1402  BP- 108/69 HR- 105 Temp- 97.8 °F (36.6 °C) O2 sat- 100%  Rechecked the patient. Pt reports still feeling nauseated and does not believe she can keep ice chips down. Pt will be given zofran.   Zofran ordered for nausea.    1440  BP- 108/69 HR- 105 Temp- 97.8 °F (36.6 °C) O2 sat- 100%  Rechecked the patient who is in NAD and is resting comfortably. Pt has not had any further episodes of vomiting and advised the plan to d/c with medication for nausea. Pt is far enough in gestation that Zofran should be tolerated. Will give Rx for Zofran for continued nausea. Pt understands and agrees with the plan, all questions answered.    MEDICAL DECISION MAKING  Results were reviewed/discussed with the patient and they were also made aware of online access. Pt also made aware that some labs, such as cultures, will not be resulted during ER visit and follow up with PMD is necessary.     MDM  Number of Diagnoses or Management Options     Amount and/or Complexity of Data Reviewed  Clinical lab tests: reviewed (Labs unremarkable)           DIAGNOSIS  Final diagnoses:   Acute on chronic congestive heart failure, unspecified congestive heart failure type   Hyperkalemia   Renal insufficiency       DISPOSITION  DISCHARGE    Patient discharged in stable condition.    Reviewed implications of results, diagnosis, meds, responsibility to follow up, warning signs and symptoms of possible worsening, potential complications and reasons to return to ER.    Patient/Family voiced understanding of above instructions.    Discussed plan for discharge, as there is no emergent indication for admission.  Pt/family is agreeable and understands need for  follow up and repeat testing.  Pt is aware that discharge does not mean that nothing is wrong but it indicates no emergency is present that requires admission and they must continue care with follow-up as given below or physician of their choice.     FOLLOW-UP  No follow-up provider specified.       Medication List      Notice     No changes were made to your prescriptions during this visit.          Latest Documented Vital Signs:  As of 2:42 PM  BP- 108/69 HR- 105 Temp- 97.8 °F (36.6 °C) O2 sat- 100%    --  Documentation assistance provided by idris Herron for Dr. Choudhury.  Information recorded by the scribe was done at my direction and has been verified and validated by me.       Oralia Herron  11/10/17 3938       Ross Choudhury MD  11/10/17 7464

## 2017-11-10 NOTE — DISCHARGE INSTRUCTIONS
We'll expect you to improve over 36 hours.  If not, or your vomiting recurs, return or see your PMD promptly.

## 2017-11-16 DIAGNOSIS — Z34.90 PREGNANCY, UNSPECIFIED GESTATIONAL AGE: Primary | ICD-10-CM

## 2017-11-16 DIAGNOSIS — Z34.82 ENCOUNTER FOR SUPERVISION OF OTHER NORMAL PREGNANCY IN SECOND TRIMESTER: Primary | ICD-10-CM

## 2017-11-16 LAB — GLUCOSE 1H P 50 G GLC PO SERPL-MCNC: 99 MG/DL (ref 65–139)

## 2017-11-17 ENCOUNTER — TELEPHONE (OUTPATIENT)
Dept: OBSTETRICS AND GYNECOLOGY | Facility: CLINIC | Age: 22
End: 2017-11-17

## 2017-11-17 ENCOUNTER — HOSPITAL ENCOUNTER (OUTPATIENT)
Facility: HOSPITAL | Age: 22
Setting detail: OBSERVATION
Discharge: HOME | End: 2017-11-17
Attending: OBSTETRICS & GYNECOLOGY | Admitting: OBSTETRICS & GYNECOLOGY

## 2017-11-17 VITALS
RESPIRATION RATE: 18 BRPM | TEMPERATURE: 98.2 F | SYSTOLIC BLOOD PRESSURE: 97 MMHG | HEART RATE: 63 BPM | DIASTOLIC BLOOD PRESSURE: 52 MMHG | BODY MASS INDEX: 43.32 KG/M2 | HEIGHT: 65 IN | WEIGHT: 260 LBS

## 2017-11-17 PROBLEM — Z34.90 PREGNANCY: Status: ACTIVE | Noted: 2017-11-17

## 2017-11-17 PROCEDURE — 59025 FETAL NON-STRESS TEST: CPT | Performed by: OBSTETRICS & GYNECOLOGY

## 2017-11-17 PROCEDURE — 59025 FETAL NON-STRESS TEST: CPT

## 2017-11-17 PROCEDURE — 96361 HYDRATE IV INFUSION ADD-ON: CPT

## 2017-11-17 PROCEDURE — 96374 THER/PROPH/DIAG INJ IV PUSH: CPT

## 2017-11-17 PROCEDURE — G0378 HOSPITAL OBSERVATION PER HR: HCPCS

## 2017-11-17 RX ORDER — PROMETHAZINE HYDROCHLORIDE 12.5 MG/1
12.5 TABLET ORAL EVERY 6 HOURS PRN
COMMUNITY
End: 2018-02-14 | Stop reason: HOSPADM

## 2017-11-17 RX ORDER — SODIUM CHLORIDE 0.9 % (FLUSH) 0.9 %
1-10 SYRINGE (ML) INJECTION AS NEEDED
Status: DISCONTINUED | OUTPATIENT
Start: 2017-11-17 | End: 2017-11-17 | Stop reason: HOSPADM

## 2017-11-17 RX ORDER — PROMETHAZINE HYDROCHLORIDE 25 MG/ML
25 INJECTION, SOLUTION INTRAMUSCULAR; INTRAVENOUS ONCE
Status: DISCONTINUED | OUTPATIENT
Start: 2017-11-17 | End: 2017-11-17 | Stop reason: HOSPADM

## 2017-11-17 RX ORDER — ALBUTEROL SULFATE 90 UG/1
1 AEROSOL, METERED RESPIRATORY (INHALATION) 2 TIMES DAILY
Status: ON HOLD | COMMUNITY
End: 2018-02-10

## 2017-11-17 RX ORDER — FAMOTIDINE 10 MG/ML
20 INJECTION, SOLUTION INTRAVENOUS ONCE
Status: COMPLETED | OUTPATIENT
Start: 2017-11-17 | End: 2017-11-17

## 2017-11-17 RX ADMIN — FAMOTIDINE 20 MG: 10 INJECTION, SOLUTION INTRAVENOUS at 17:59

## 2017-11-17 RX ADMIN — SODIUM CHLORIDE, POTASSIUM CHLORIDE, SODIUM LACTATE AND CALCIUM CHLORIDE 1000 ML: 600; 310; 30; 20 INJECTION, SOLUTION INTRAVENOUS at 17:52

## 2017-11-17 NOTE — NON STRESS TEST
Krishna Vince, a  at 26w5d with an SULY of 2018, by Ultrasound, was seen at [facility] for a nonstress test.    Chief Complaint   Patient presents with   • Vomiting       Interpretation A  Nonstress Test Interpretation A: Reactive (17 1755 : Alecia Martinez RN)

## 2017-11-17 NOTE — TELEPHONE ENCOUNTER
Called pt to inform of results and she is having chest pains and throwing up.   Informed Dr. Sims, was told to go to hospital

## 2017-11-27 ENCOUNTER — ROUTINE PRENATAL (OUTPATIENT)
Dept: OBSTETRICS AND GYNECOLOGY | Facility: CLINIC | Age: 22
End: 2017-11-27

## 2017-11-27 VITALS — WEIGHT: 265.2 LBS | BODY MASS INDEX: 44.13 KG/M2 | DIASTOLIC BLOOD PRESSURE: 67 MMHG | SYSTOLIC BLOOD PRESSURE: 108 MMHG

## 2017-11-27 DIAGNOSIS — Z34.83 ENCOUNTER FOR SUPERVISION OF OTHER NORMAL PREGNANCY IN THIRD TRIMESTER: Primary | ICD-10-CM

## 2017-11-27 PROCEDURE — 99213 OFFICE O/P EST LOW 20 MIN: CPT | Performed by: OBSTETRICS & GYNECOLOGY

## 2017-11-27 RX ORDER — VALACYCLOVIR HYDROCHLORIDE 500 MG/1
500 TABLET, FILM COATED ORAL
COMMUNITY
Start: 2016-02-08 | End: 2017-12-27 | Stop reason: SDUPTHER

## 2017-11-27 NOTE — PROGRESS NOTES
CC:  Pregnancy  Patient reports having irregular contractions that started several days ago.  Patient states that contractions are very infrequent.  She denies any painful, regular contractions.  She denies any leaking or bleeding.  Patient reports good fetal movement.  Explained to patient that Cameron Felder contractions can be normal at this gestation and  labor precautions were reviewed with her and when to go to the hospital.  Discussed fetal kick counts and what to expect in the third trimester.  A/P:  Supervision of normal pregnancy at 28 weeks  --F/U in 2 weeks  Counseling was given to patient for the following topics: instructions for management and patient and family education . Total time of the encounter was 15 minutes and 10 minutes was spend counseling.

## 2017-12-04 ENCOUNTER — ROUTINE PRENATAL (OUTPATIENT)
Dept: OBSTETRICS AND GYNECOLOGY | Facility: CLINIC | Age: 22
End: 2017-12-04

## 2017-12-04 ENCOUNTER — HOSPITAL ENCOUNTER (OUTPATIENT)
Dept: CARDIOLOGY | Facility: HOSPITAL | Age: 22
Discharge: HOME | End: 2017-12-04
Attending: OBSTETRICS & GYNECOLOGY | Admitting: OBSTETRICS & GYNECOLOGY

## 2017-12-04 VITALS — DIASTOLIC BLOOD PRESSURE: 75 MMHG | BODY MASS INDEX: 44.66 KG/M2 | SYSTOLIC BLOOD PRESSURE: 107 MMHG | WEIGHT: 268.4 LBS

## 2017-12-04 DIAGNOSIS — M79.605 LEFT LEG PAIN: Primary | ICD-10-CM

## 2017-12-04 DIAGNOSIS — M79.605 LEFT LEG PAIN: ICD-10-CM

## 2017-12-04 DIAGNOSIS — Z3A.29 29 WEEKS GESTATION OF PREGNANCY: ICD-10-CM

## 2017-12-04 LAB
BH CV LOWER VASCULAR LEFT COMMON FEMORAL AUGMENT: NORMAL
BH CV LOWER VASCULAR LEFT COMMON FEMORAL COMPETENT: NORMAL
BH CV LOWER VASCULAR LEFT COMMON FEMORAL COMPRESS: NORMAL
BH CV LOWER VASCULAR LEFT COMMON FEMORAL PHASIC: NORMAL
BH CV LOWER VASCULAR LEFT COMMON FEMORAL SPONT: NORMAL
BH CV LOWER VASCULAR LEFT DISTAL FEMORAL COMPRESS: NORMAL
BH CV LOWER VASCULAR LEFT GASTRONEMIUS COMPRESS: NORMAL
BH CV LOWER VASCULAR LEFT GREATER SAPH AK COMPRESS: NORMAL
BH CV LOWER VASCULAR LEFT GREATER SAPH BK COMPRESS: NORMAL
BH CV LOWER VASCULAR LEFT LESSER SAPH COMPRESS: NORMAL
BH CV LOWER VASCULAR LEFT MID FEMORAL AUGMENT: NORMAL
BH CV LOWER VASCULAR LEFT MID FEMORAL COMPETENT: NORMAL
BH CV LOWER VASCULAR LEFT MID FEMORAL COMPRESS: NORMAL
BH CV LOWER VASCULAR LEFT MID FEMORAL PHASIC: NORMAL
BH CV LOWER VASCULAR LEFT MID FEMORAL SPONT: NORMAL
BH CV LOWER VASCULAR LEFT PERONEAL COMPRESS: NORMAL
BH CV LOWER VASCULAR LEFT POPLITEAL AUGMENT: NORMAL
BH CV LOWER VASCULAR LEFT POPLITEAL COMPETENT: NORMAL
BH CV LOWER VASCULAR LEFT POPLITEAL COMPRESS: NORMAL
BH CV LOWER VASCULAR LEFT POPLITEAL PHASIC: NORMAL
BH CV LOWER VASCULAR LEFT POPLITEAL SPONT: NORMAL
BH CV LOWER VASCULAR LEFT POSTERIOR TIBIAL COMPRESS: NORMAL
BH CV LOWER VASCULAR LEFT PROXIMAL FEMORAL COMPRESS: NORMAL
BH CV LOWER VASCULAR LEFT SAPHENOFEMORAL JUNCTION COMPRESS: NORMAL
BH CV LOWER VASCULAR LEFT SAPHENOFEMORAL JUNCTION PHASIC: NORMAL
BH CV LOWER VASCULAR LEFT SAPHENOFEMORAL JUNCTION SPONT: NORMAL
BH CV LOWER VASCULAR RIGHT COMMON FEMORAL AUGMENT: NORMAL
BH CV LOWER VASCULAR RIGHT COMMON FEMORAL COMPETENT: NORMAL
BH CV LOWER VASCULAR RIGHT COMMON FEMORAL COMPRESS: NORMAL
BH CV LOWER VASCULAR RIGHT COMMON FEMORAL PHASIC: NORMAL
BH CV LOWER VASCULAR RIGHT COMMON FEMORAL SPONT: NORMAL

## 2017-12-04 PROCEDURE — 99213 OFFICE O/P EST LOW 20 MIN: CPT | Performed by: OBSTETRICS & GYNECOLOGY

## 2017-12-04 PROCEDURE — 93971 EXTREMITY STUDY: CPT

## 2017-12-04 NOTE — PROGRESS NOTES
CC:  Pregnancy  Patient complains of left thigh pain that radiates into the back of her knee.  Patient states that this started approximately 2 weeks ago but has progressively worsened over the last 2 weeks.  She states that she has difficulty walking and sleeping due to the pain.  She has tried Tylenol and stretches with no improvement in her pain.  She denies any swelling or discoloration of the area.  She denies any recent trauma to the area.  Patient appears to have normal gait on exam.  Both extremities have 1+ edema.  The left thigh is tender to palpation.  No discoloration is noted.  Explained to patient that I think her pain is most likely related to muscle strain but a lower extremity Doppler has been ordered to rule out DVT.  Explained to patient that if negative, muscle strain is treated with Tylenol, massage, or heat.  Physical therapy can be considered.  A/P:  Left leg pain at 29 weeks  --F/U next week  --Doppler of LE ordered  Counseling was given to patient for the following topics: instructions for management, risk factor reductions, prognosis and patient and family education . Total time of the encounter was 15 minutes and 10 minutes was spend counseling.

## 2017-12-11 ENCOUNTER — ROUTINE PRENATAL (OUTPATIENT)
Dept: OBSTETRICS AND GYNECOLOGY | Facility: CLINIC | Age: 22
End: 2017-12-11

## 2017-12-11 VITALS — BODY MASS INDEX: 44.83 KG/M2 | DIASTOLIC BLOOD PRESSURE: 67 MMHG | SYSTOLIC BLOOD PRESSURE: 106 MMHG | WEIGHT: 269.4 LBS

## 2017-12-11 DIAGNOSIS — O09.299 PREVIOUS STILLBIRTH OR DEMISE, ANTEPARTUM: ICD-10-CM

## 2017-12-11 DIAGNOSIS — O99.213 OBESITY AFFECTING PREGNANCY IN THIRD TRIMESTER: Primary | ICD-10-CM

## 2017-12-11 DIAGNOSIS — Z3A.30 30 WEEKS GESTATION OF PREGNANCY: ICD-10-CM

## 2017-12-11 PROCEDURE — 90715 TDAP VACCINE 7 YRS/> IM: CPT | Performed by: OBSTETRICS & GYNECOLOGY

## 2017-12-11 PROCEDURE — 99213 OFFICE O/P EST LOW 20 MIN: CPT | Performed by: OBSTETRICS & GYNECOLOGY

## 2017-12-11 PROCEDURE — 90471 IMMUNIZATION ADMIN: CPT | Performed by: OBSTETRICS & GYNECOLOGY

## 2017-12-11 NOTE — PROGRESS NOTES
CC:  Pregnancy  Patient states her leg pain has improved with stretching and tylenol.  No other complaints today.  She reports good fetal movement.  Discussed recommendations for tdap and she desires.  Will check growth u/s in 2 weeks and start weekly BPPs due to obesity and h/o stillbirth.  A/P:  Supervision of normal pregnancy at 30 weeks with history of maternal obesity  --Followup in 2 weeks with growth u/s and start weekly BPPs  Counseling was given to patient for the following topics: instructions for management and patient and family education . Total time of the encounter was 15 minutes and 10 minutes was spend counseling.

## 2017-12-27 ENCOUNTER — ROUTINE PRENATAL (OUTPATIENT)
Dept: OBSTETRICS AND GYNECOLOGY | Facility: CLINIC | Age: 22
End: 2017-12-27

## 2017-12-27 ENCOUNTER — PROCEDURE VISIT (OUTPATIENT)
Dept: OBSTETRICS AND GYNECOLOGY | Facility: CLINIC | Age: 22
End: 2017-12-27

## 2017-12-27 VITALS — SYSTOLIC BLOOD PRESSURE: 118 MMHG | DIASTOLIC BLOOD PRESSURE: 73 MMHG | WEIGHT: 271.2 LBS | BODY MASS INDEX: 45.13 KG/M2

## 2017-12-27 DIAGNOSIS — Z3A.32 32 WEEKS GESTATION OF PREGNANCY: ICD-10-CM

## 2017-12-27 DIAGNOSIS — O09.299 PREVIOUS STILLBIRTH OR DEMISE, ANTEPARTUM: Primary | ICD-10-CM

## 2017-12-27 DIAGNOSIS — O99.213 OBESITY AFFECTING PREGNANCY IN THIRD TRIMESTER: Primary | ICD-10-CM

## 2017-12-27 DIAGNOSIS — O09.299 PREVIOUS STILLBIRTH OR DEMISE, ANTEPARTUM: ICD-10-CM

## 2017-12-27 DIAGNOSIS — Z86.19 HISTORY OF HERPES GENITALIS: ICD-10-CM

## 2017-12-27 DIAGNOSIS — O09.293 HISTORY OF STILLBIRTH IN CURRENTLY PREGNANT PATIENT, THIRD TRIMESTER: ICD-10-CM

## 2017-12-27 PROCEDURE — 76819 FETAL BIOPHYS PROFIL W/O NST: CPT | Performed by: OBSTETRICS & GYNECOLOGY

## 2017-12-27 PROCEDURE — 99213 OFFICE O/P EST LOW 20 MIN: CPT | Performed by: OBSTETRICS & GYNECOLOGY

## 2017-12-27 PROCEDURE — 76816 OB US FOLLOW-UP PER FETUS: CPT | Performed by: OBSTETRICS & GYNECOLOGY

## 2017-12-27 RX ORDER — VALACYCLOVIR HYDROCHLORIDE 500 MG/1
500 TABLET, FILM COATED ORAL 2 TIMES DAILY
Qty: 60 TABLET | Refills: 0 | Status: SHIPPED | OUTPATIENT
Start: 2017-12-27 | End: 2018-02-14 | Stop reason: HOSPADM

## 2017-12-27 NOTE — PROGRESS NOTES
CC:  Pregnancy  Patient complains of worsening carpal tunnel in her hands and states she is unable to perform her duties at work.  She is requesting me to sign paper work for patient to be off work.  Discussed conservative management of carpal tunnel and patient advised to bring paperwork to the office.  She reports good fetal movement.  Discussed growth u/s and growth u/s reassuring.  Plan to continue weekly BPPs for h/o stillbirth.  A/P:  Supervision of normal pregnancy at 32 weeks with h/o maternal obesity and stillbirth  --continue with weekly BPPs  --F/U in 2 weeks  Counseling was given to patient for the following topics: diagnostic results, instructions for management and patient and family education . Total time of the encounter was 15 minutes and 10 minutes was spend counseling.

## 2018-01-02 ENCOUNTER — ROUTINE PRENATAL (OUTPATIENT)
Dept: OBSTETRICS AND GYNECOLOGY | Facility: CLINIC | Age: 23
End: 2018-01-02

## 2018-01-02 ENCOUNTER — PROCEDURE VISIT (OUTPATIENT)
Dept: OBSTETRICS AND GYNECOLOGY | Facility: CLINIC | Age: 23
End: 2018-01-02

## 2018-01-02 VITALS — WEIGHT: 273.2 LBS | SYSTOLIC BLOOD PRESSURE: 111 MMHG | DIASTOLIC BLOOD PRESSURE: 69 MMHG | BODY MASS INDEX: 45.46 KG/M2

## 2018-01-02 DIAGNOSIS — Z3A.33 33 WEEKS GESTATION OF PREGNANCY: ICD-10-CM

## 2018-01-02 DIAGNOSIS — O09.293 HISTORY OF STILLBIRTH IN CURRENTLY PREGNANT PATIENT, THIRD TRIMESTER: ICD-10-CM

## 2018-01-02 DIAGNOSIS — O99.213 OBESITY COMPLICATING PREGNANCY IN THIRD TRIMESTER: Primary | ICD-10-CM

## 2018-01-02 DIAGNOSIS — O09.299 PREVIOUS STILLBIRTH OR DEMISE, ANTEPARTUM: ICD-10-CM

## 2018-01-02 PROCEDURE — 76819 FETAL BIOPHYS PROFIL W/O NST: CPT | Performed by: OBSTETRICS & GYNECOLOGY

## 2018-01-02 PROCEDURE — 99212 OFFICE O/P EST SF 10 MIN: CPT | Performed by: OBSTETRICS & GYNECOLOGY

## 2018-01-02 NOTE — PROGRESS NOTES
CC:  Pregnancy  Patient has been using wrist splints and elevating hands at night and has had slight improvement in carpal tunnel.  Reports good fetal movement.  BPP 8/8 today.  Discussed fetal kick counts.  A/P:  Supervision of pregnancy at 33 weeks with maternal obesity and h/o stillbirth  --F/U with weekly BPPs  --OB visit in 2 weeks

## 2018-01-10 ENCOUNTER — PROCEDURE VISIT (OUTPATIENT)
Dept: OBSTETRICS AND GYNECOLOGY | Facility: CLINIC | Age: 23
End: 2018-01-10

## 2018-01-10 DIAGNOSIS — O09.293 HISTORY OF STILLBIRTH IN CURRENTLY PREGNANT PATIENT, THIRD TRIMESTER: Primary | ICD-10-CM

## 2018-01-10 DIAGNOSIS — O99.213 OBESITY AFFECTING PREGNANCY IN THIRD TRIMESTER: ICD-10-CM

## 2018-01-10 DIAGNOSIS — O09.299 PREVIOUS STILLBIRTH OR DEMISE, ANTEPARTUM: ICD-10-CM

## 2018-01-10 PROCEDURE — 76819 FETAL BIOPHYS PROFIL W/O NST: CPT | Performed by: OBSTETRICS & GYNECOLOGY

## 2018-01-15 ENCOUNTER — PROCEDURE VISIT (OUTPATIENT)
Dept: OBSTETRICS AND GYNECOLOGY | Facility: CLINIC | Age: 23
End: 2018-01-15

## 2018-01-15 ENCOUNTER — TELEPHONE (OUTPATIENT)
Dept: OBSTETRICS AND GYNECOLOGY | Facility: CLINIC | Age: 23
End: 2018-01-15

## 2018-01-15 ENCOUNTER — ROUTINE PRENATAL (OUTPATIENT)
Dept: OBSTETRICS AND GYNECOLOGY | Facility: CLINIC | Age: 23
End: 2018-01-15

## 2018-01-15 VITALS — SYSTOLIC BLOOD PRESSURE: 103 MMHG | BODY MASS INDEX: 46.23 KG/M2 | WEIGHT: 277.8 LBS | DIASTOLIC BLOOD PRESSURE: 66 MMHG

## 2018-01-15 DIAGNOSIS — O09.299 PREVIOUS STILLBIRTH OR DEMISE, ANTEPARTUM: ICD-10-CM

## 2018-01-15 DIAGNOSIS — Z3A.35 35 WEEKS GESTATION OF PREGNANCY: ICD-10-CM

## 2018-01-15 DIAGNOSIS — O09.299 PREVIOUS STILLBIRTH OR DEMISE, ANTEPARTUM: Primary | ICD-10-CM

## 2018-01-15 DIAGNOSIS — O99.213 OBESITY COMPLICATING PREGNANCY IN THIRD TRIMESTER: Primary | ICD-10-CM

## 2018-01-15 LAB — EXTERNAL GROUP B STREP ANTIGEN: NEGATIVE

## 2018-01-15 PROCEDURE — 99213 OFFICE O/P EST LOW 20 MIN: CPT | Performed by: OBSTETRICS & GYNECOLOGY

## 2018-01-15 PROCEDURE — 76819 FETAL BIOPHYS PROFIL W/O NST: CPT | Performed by: OBSTETRICS & GYNECOLOGY

## 2018-01-15 NOTE — TELEPHONE ENCOUNTER
Ok, tell her this is something that we won't schedule until week before and we will schedule it during her ob visit.    ----- Message from Cynthia Reyer sent at 1/15/2018  1:39 PM EST -----  Pt has thought about induction and would like to go ahead and schedule that. Thank you. If you need to speak with her. # [phone]

## 2018-01-15 NOTE — PROGRESS NOTES
CC:  Pregnancy  Patient reports pelvic pressure but denies any contractions or leaking of fluid.  She reports good fetal movement.  Biophysical profile today is 8 out of 8.  Discussed with her the importance of fetal kick counts and labor precautions were reviewed with her today.  GBS swab was done.  A/P:  Supervision of pregnancy at 35 weeks with h/o fetal demise  --GBS today  --F/U weekly with weekly BPPs  Counseling was given to patient for the following topics: diagnostic results, prognosis and patient and family education . Total time of the encounter was 15 minutes and 10 minutes was spend counseling.

## 2018-01-19 LAB — B-HEM STREP SPEC QL CULT: NEGATIVE

## 2018-01-22 ENCOUNTER — PROCEDURE VISIT (OUTPATIENT)
Dept: OBSTETRICS AND GYNECOLOGY | Facility: CLINIC | Age: 23
End: 2018-01-22

## 2018-01-22 ENCOUNTER — ROUTINE PRENATAL (OUTPATIENT)
Dept: OBSTETRICS AND GYNECOLOGY | Facility: CLINIC | Age: 23
End: 2018-01-22

## 2018-01-22 VITALS — BODY MASS INDEX: 46.69 KG/M2 | WEIGHT: 280.6 LBS | DIASTOLIC BLOOD PRESSURE: 69 MMHG | SYSTOLIC BLOOD PRESSURE: 110 MMHG

## 2018-01-22 DIAGNOSIS — Z3A.36 36 WEEKS GESTATION OF PREGNANCY: ICD-10-CM

## 2018-01-22 DIAGNOSIS — O09.299 PREVIOUS STILLBIRTH OR DEMISE, ANTEPARTUM: Primary | ICD-10-CM

## 2018-01-22 DIAGNOSIS — O99.213 OBESITY COMPLICATING PREGNANCY IN THIRD TRIMESTER: Primary | ICD-10-CM

## 2018-01-22 DIAGNOSIS — O09.299 PREVIOUS STILLBIRTH OR DEMISE, ANTEPARTUM: ICD-10-CM

## 2018-01-22 PROCEDURE — 76816 OB US FOLLOW-UP PER FETUS: CPT | Performed by: OBSTETRICS & GYNECOLOGY

## 2018-01-22 PROCEDURE — 99213 OFFICE O/P EST LOW 20 MIN: CPT | Performed by: OBSTETRICS & GYNECOLOGY

## 2018-01-22 PROCEDURE — 76819 FETAL BIOPHYS PROFIL W/O NST: CPT | Performed by: OBSTETRICS & GYNECOLOGY

## 2018-01-22 NOTE — PROGRESS NOTES
CC:  Pregnancy  Patient complains of left ear hurting today but states she had dental work done last week on the left side.  She denies fevers.  Advised to see PCP if it continues or any other associated symptoms or fever.  She reports good fetal movement.  BPP 8/8 today.  Growth at 64.7%, AC 81.2%.  Discussed monitoring fetal kick counts.  Will plan IOL after 39 weeks for history of demise.  Patient is on valtrex bid for history of HSV.  No outbreaks this pregnancy.  A/P:  Supervision of pregnancy at 36 weeks with history of demise  --Continue weekly ob visits and BPPs  Counseling was given to patient for the following topics: diagnostic results and patient and family education . Total time of the encounter was 15 minutes and 10 minutes was spend counseling.

## 2018-01-29 ENCOUNTER — ROUTINE PRENATAL (OUTPATIENT)
Dept: OBSTETRICS AND GYNECOLOGY | Facility: CLINIC | Age: 23
End: 2018-01-29

## 2018-01-29 ENCOUNTER — PROCEDURE VISIT (OUTPATIENT)
Dept: OBSTETRICS AND GYNECOLOGY | Facility: CLINIC | Age: 23
End: 2018-01-29

## 2018-01-29 VITALS — DIASTOLIC BLOOD PRESSURE: 68 MMHG | WEIGHT: 281 LBS | BODY MASS INDEX: 46.76 KG/M2 | SYSTOLIC BLOOD PRESSURE: 111 MMHG

## 2018-01-29 DIAGNOSIS — O09.299 PREVIOUS STILLBIRTH OR DEMISE, ANTEPARTUM: Primary | ICD-10-CM

## 2018-01-29 DIAGNOSIS — Z3A.37 37 WEEKS GESTATION OF PREGNANCY: ICD-10-CM

## 2018-01-29 DIAGNOSIS — O09.299 PREVIOUS STILLBIRTH OR DEMISE, ANTEPARTUM: ICD-10-CM

## 2018-01-29 DIAGNOSIS — O99.213 OBESITY COMPLICATING PREGNANCY IN THIRD TRIMESTER: Primary | ICD-10-CM

## 2018-01-29 PROCEDURE — 76819 FETAL BIOPHYS PROFIL W/O NST: CPT | Performed by: OBSTETRICS & GYNECOLOGY

## 2018-01-29 PROCEDURE — 99213 OFFICE O/P EST LOW 20 MIN: CPT | Performed by: OBSTETRICS & GYNECOLOGY

## 2018-01-29 NOTE — PROGRESS NOTES
CC:  Pregnancy  Patient states she was seen in urgent care yesterday for congestion and difficulty breathing.  She states that she was prescribed amoxicillin.  She reports doing a breathing treatment at home and has felt significant improvement.  On exam today, lungs are clear to auscultation bilaterally and no crackles or wheezes are heard.  Patient has not feeled her amoxicillin.  Explained to patient that symptoms are likely related to viral respiratory infection.  She denies any fevers.  Since her symptoms have essentially resolved today, recommend not starting the amoxicillin.  Warning signs were discussed with her and patient was advised to report to Scient[facility] if she has any further symptoms.  She reports good fetal movement.  Biophysical profile today is 8 out of 8.  Discussed scheduling her induction of labor for February 11.  Continue with fetal kick counts.  A/P:  Supervision of pregnancy at 37 weeks with h/o fetal demise  --F/U weekly with BPPs  Counseling was given to patient for the following topics: diagnostic results, instructions for management and patient and family education . Total time of the encounter was 15 minutes and 10 minutes was spend counseling.

## 2018-02-06 ENCOUNTER — ROUTINE PRENATAL (OUTPATIENT)
Dept: OBSTETRICS AND GYNECOLOGY | Facility: CLINIC | Age: 23
End: 2018-02-06

## 2018-02-06 ENCOUNTER — PROCEDURE VISIT (OUTPATIENT)
Dept: OBSTETRICS AND GYNECOLOGY | Facility: CLINIC | Age: 23
End: 2018-02-06

## 2018-02-06 VITALS — DIASTOLIC BLOOD PRESSURE: 77 MMHG | SYSTOLIC BLOOD PRESSURE: 109 MMHG | BODY MASS INDEX: 46.96 KG/M2 | WEIGHT: 282.2 LBS

## 2018-02-06 DIAGNOSIS — O99.213 OBESITY COMPLICATING PREGNANCY IN THIRD TRIMESTER: Primary | ICD-10-CM

## 2018-02-06 DIAGNOSIS — Z3A.38 38 WEEKS GESTATION OF PREGNANCY: ICD-10-CM

## 2018-02-06 DIAGNOSIS — O09.299 PREVIOUS STILLBIRTH OR DEMISE, ANTEPARTUM: ICD-10-CM

## 2018-02-06 DIAGNOSIS — O99.213 OBESITY AFFECTING PREGNANCY IN THIRD TRIMESTER: Primary | ICD-10-CM

## 2018-02-06 PROCEDURE — 99213 OFFICE O/P EST LOW 20 MIN: CPT | Performed by: OBSTETRICS & GYNECOLOGY

## 2018-02-06 PROCEDURE — 76819 FETAL BIOPHYS PROFIL W/O NST: CPT | Performed by: OBSTETRICS & GYNECOLOGY

## 2018-02-06 NOTE — PROGRESS NOTES
CC:  Pregnancy  Patient reports good fetal movement and denies any contractions.  Her BPP today is 8 out of 8.  She does continue to have problems with carpal tunnel syndrome.  Planning on inducing patient at 39 weeks for history of stillbirth and maternal obesity.  Patient will need cervical ripening and discussed with her cervical ripening using Cervidil.  Induction process explained to her and discussed risks and benefits of induction of labor and she agrees to proceed.  A/P:  Supervision of pregnancy at 38 weeks with maternal obesity and h/o stillbirth  --IOL at 39 weeks  Counseling was given to patient for the following topics: diagnostic results, instructions for management, patient and family education and risks and benefits of treatment options . Total time of the encounter was 15 minutes and 10 minutes was spend counseling.

## 2018-02-10 ENCOUNTER — ANESTHESIA (OUTPATIENT)
Dept: LABOR AND DELIVERY | Facility: HOSPITAL | Age: 23
End: 2018-02-10

## 2018-02-10 ENCOUNTER — HOSPITAL ENCOUNTER (INPATIENT)
Dept: LABOR AND DELIVERY | Facility: HOSPITAL | Age: 23
LOS: 4 days | Discharge: HOME | End: 2018-02-14
Attending: OBSTETRICS & GYNECOLOGY | Admitting: OBSTETRICS & GYNECOLOGY

## 2018-02-10 ENCOUNTER — ANESTHESIA EVENT (OUTPATIENT)
Dept: LABOR AND DELIVERY | Facility: HOSPITAL | Age: 23
End: 2018-02-10

## 2018-02-10 DIAGNOSIS — O09.299 PREVIOUS STILLBIRTH OR DEMISE, ANTEPARTUM: Primary | ICD-10-CM

## 2018-02-10 PROBLEM — Z87.59 HISTORY OF STILLBIRTH: Status: ACTIVE | Noted: 2018-02-10

## 2018-02-10 LAB
ABO GROUP BLD: NORMAL
BASOPHILS # BLD AUTO: 0.01 10*3/MM3 (ref 0–0.2)
BASOPHILS NFR BLD AUTO: 0.1 % (ref 0–1.5)
BLD GP AB SCN SERPL QL: NEGATIVE
DEPRECATED RDW RBC AUTO: 47.1 FL (ref 37–54)
EOSINOPHIL # BLD AUTO: 0.22 10*3/MM3 (ref 0–0.7)
EOSINOPHIL NFR BLD AUTO: 2.5 % (ref 0.3–6.2)
ERYTHROCYTE [DISTWIDTH] IN BLOOD BY AUTOMATED COUNT: 14.2 % (ref 11.7–13)
HCT VFR BLD AUTO: 32.3 % (ref 35.6–45.5)
HGB BLD-MCNC: 10.3 G/DL (ref 11.9–15.5)
IMM GRANULOCYTES # BLD: 0.02 10*3/MM3 (ref 0–0.03)
IMM GRANULOCYTES NFR BLD: 0.2 % (ref 0–0.5)
LYMPHOCYTES # BLD AUTO: 2.51 10*3/MM3 (ref 0.9–4.8)
LYMPHOCYTES NFR BLD AUTO: 28.8 % (ref 19.6–45.3)
MCH RBC QN AUTO: 28.9 PG (ref 26.9–32)
MCHC RBC AUTO-ENTMCNC: 31.9 G/DL (ref 32.4–36.3)
MCV RBC AUTO: 90.5 FL (ref 80.5–98.2)
MONOCYTES # BLD AUTO: 0.71 10*3/MM3 (ref 0.2–1.2)
MONOCYTES NFR BLD AUTO: 8.2 % (ref 5–12)
NEUTROPHILS # BLD AUTO: 5.24 10*3/MM3 (ref 1.9–8.1)
NEUTROPHILS NFR BLD AUTO: 60.2 % (ref 42.7–76)
PLATELET # BLD AUTO: 219 10*3/MM3 (ref 140–500)
PMV BLD AUTO: 11.2 FL (ref 6–12)
RBC # BLD AUTO: 3.57 10*6/MM3 (ref 3.9–5.2)
RH BLD: POSITIVE
WBC NRBC COR # BLD: 8.71 10*3/MM3 (ref 4.5–10.7)

## 2018-02-10 PROCEDURE — 86901 BLOOD TYPING SEROLOGIC RH(D): CPT | Performed by: OBSTETRICS & GYNECOLOGY

## 2018-02-10 PROCEDURE — 85025 COMPLETE CBC W/AUTO DIFF WBC: CPT | Performed by: OBSTETRICS & GYNECOLOGY

## 2018-02-10 PROCEDURE — 86900 BLOOD TYPING SEROLOGIC ABO: CPT | Performed by: OBSTETRICS & GYNECOLOGY

## 2018-02-10 PROCEDURE — 86850 RBC ANTIBODY SCREEN: CPT | Performed by: OBSTETRICS & GYNECOLOGY

## 2018-02-10 RX ORDER — LIDOCAINE HYDROCHLORIDE 10 MG/ML
5 INJECTION, SOLUTION EPIDURAL; INFILTRATION; INTRACAUDAL; PERINEURAL AS NEEDED
Status: DISCONTINUED | OUTPATIENT
Start: 2018-02-10 | End: 2018-02-12 | Stop reason: HOSPADM

## 2018-02-10 RX ORDER — DIPHENHYDRAMINE HYDROCHLORIDE 50 MG/ML
12.5 INJECTION INTRAMUSCULAR; INTRAVENOUS EVERY 8 HOURS PRN
Status: DISCONTINUED | OUTPATIENT
Start: 2018-02-10 | End: 2018-02-12 | Stop reason: HOSPADM

## 2018-02-10 RX ORDER — FAMOTIDINE 10 MG/ML
20 INJECTION, SOLUTION INTRAVENOUS ONCE AS NEEDED
Status: DISCONTINUED | OUTPATIENT
Start: 2018-02-10 | End: 2018-02-12 | Stop reason: HOSPADM

## 2018-02-10 RX ORDER — ACETAMINOPHEN 325 MG/1
650 TABLET ORAL EVERY 4 HOURS PRN
Status: DISCONTINUED | OUTPATIENT
Start: 2018-02-10 | End: 2018-02-12 | Stop reason: HOSPADM

## 2018-02-10 RX ORDER — ONDANSETRON 4 MG/1
4 TABLET, ORALLY DISINTEGRATING ORAL EVERY 6 HOURS PRN
Status: DISCONTINUED | OUTPATIENT
Start: 2018-02-10 | End: 2018-02-12 | Stop reason: HOSPADM

## 2018-02-10 RX ORDER — EPHEDRINE SULFATE 50 MG/ML
5 INJECTION, SOLUTION INTRAVENOUS AS NEEDED
Status: DISCONTINUED | OUTPATIENT
Start: 2018-02-10 | End: 2018-02-12 | Stop reason: HOSPADM

## 2018-02-10 RX ORDER — ZOLPIDEM TARTRATE 5 MG/1
5 TABLET ORAL NIGHTLY PRN
Status: DISCONTINUED | OUTPATIENT
Start: 2018-02-10 | End: 2018-02-12

## 2018-02-10 RX ORDER — ALBUTEROL SULFATE 90 UG/1
2 AEROSOL, METERED RESPIRATORY (INHALATION)
COMMUNITY
Start: 2018-02-09 | End: 2019-02-09

## 2018-02-10 RX ORDER — ONDANSETRON 2 MG/ML
4 INJECTION INTRAMUSCULAR; INTRAVENOUS EVERY 6 HOURS PRN
Status: DISCONTINUED | OUTPATIENT
Start: 2018-02-10 | End: 2018-02-12 | Stop reason: HOSPADM

## 2018-02-10 RX ORDER — AMOXICILLIN 875 MG/1
875 TABLET, COATED ORAL
COMMUNITY
Start: 2018-02-09 | End: 2018-02-14 | Stop reason: HOSPADM

## 2018-02-10 RX ORDER — SODIUM CHLORIDE, SODIUM LACTATE, POTASSIUM CHLORIDE, CALCIUM CHLORIDE 600; 310; 30; 20 MG/100ML; MG/100ML; MG/100ML; MG/100ML
125 INJECTION, SOLUTION INTRAVENOUS CONTINUOUS
Status: DISCONTINUED | OUTPATIENT
Start: 2018-02-10 | End: 2018-02-12

## 2018-02-10 RX ORDER — VALACYCLOVIR HYDROCHLORIDE 500 MG/1
500 TABLET, FILM COATED ORAL EVERY 12 HOURS SCHEDULED
Status: DISCONTINUED | OUTPATIENT
Start: 2018-02-10 | End: 2018-02-12

## 2018-02-10 RX ORDER — ONDANSETRON 4 MG/1
4 TABLET, FILM COATED ORAL EVERY 6 HOURS PRN
Status: DISCONTINUED | OUTPATIENT
Start: 2018-02-10 | End: 2018-02-12 | Stop reason: HOSPADM

## 2018-02-10 RX ORDER — SODIUM CHLORIDE 0.9 % (FLUSH) 0.9 %
1-10 SYRINGE (ML) INJECTION AS NEEDED
Status: DISCONTINUED | OUTPATIENT
Start: 2018-02-10 | End: 2018-02-12 | Stop reason: HOSPADM

## 2018-02-10 RX ORDER — ONDANSETRON 2 MG/ML
4 INJECTION INTRAMUSCULAR; INTRAVENOUS ONCE AS NEEDED
Status: DISCONTINUED | OUTPATIENT
Start: 2018-02-10 | End: 2018-02-12 | Stop reason: HOSPADM

## 2018-02-10 RX ADMIN — SODIUM CHLORIDE, POTASSIUM CHLORIDE, SODIUM LACTATE AND CALCIUM CHLORIDE 125 ML/HR: 600; 310; 30; 20 INJECTION, SOLUTION INTRAVENOUS at 22:00

## 2018-02-10 RX ADMIN — DINOPROSTONE 10 MG: 10 INSERT VAGINAL at 22:36

## 2018-02-10 RX ADMIN — ZOLPIDEM TARTRATE 5 MG: 5 TABLET ORAL at 23:06

## 2018-02-11 PROCEDURE — 3E033VJ INTRODUCTION OF OTHER HORMONE INTO PERIPHERAL VEIN, PERCUTANEOUS APPROACH: ICD-10-PCS | Performed by: OBSTETRICS & GYNECOLOGY

## 2018-02-11 PROCEDURE — 10907ZC DRAINAGE OF AMNIOTIC FLUID, THERAPEUTIC FROM PRODUCTS OF CONCEPTION, VIA NATURAL OR ARTIFICIAL OPENING: ICD-10-PCS | Performed by: OBSTETRICS & GYNECOLOGY

## 2018-02-11 PROCEDURE — 3E0P7VZ INTRODUCTION OF HORMONE INTO FEMALE REPRODUCTIVE, VIA NATURAL OR ARTIFICIAL OPENING: ICD-10-PCS | Performed by: OBSTETRICS & GYNECOLOGY

## 2018-02-11 PROCEDURE — C1755 CATHETER, INTRASPINAL: HCPCS | Performed by: ANESTHESIOLOGY

## 2018-02-11 PROCEDURE — 25010000002 ROPIVACAINE PER 1 MG: Performed by: ANESTHESIOLOGY

## 2018-02-11 RX ORDER — DIPHENHYDRAMINE HYDROCHLORIDE 50 MG/ML
12.5 INJECTION INTRAMUSCULAR; INTRAVENOUS EVERY 8 HOURS PRN
Status: DISCONTINUED | OUTPATIENT
Start: 2018-02-11 | End: 2018-02-11 | Stop reason: SDUPTHER

## 2018-02-11 RX ORDER — OXYTOCIN-SODIUM CHLORIDE 0.9% IV SOLN 30 UNIT/500ML 30-0.9/5 UT/ML-%
2-22 SOLUTION INTRAVENOUS
Status: DISCONTINUED | OUTPATIENT
Start: 2018-02-11 | End: 2018-02-12

## 2018-02-11 RX ORDER — EPHEDRINE SULFATE 50 MG/ML
5 INJECTION, SOLUTION INTRAVENOUS
Status: DISCONTINUED | OUTPATIENT
Start: 2018-02-11 | End: 2018-02-12 | Stop reason: HOSPADM

## 2018-02-11 RX ORDER — ROPIVACAINE HYDROCHLORIDE 2 MG/ML
INJECTION, SOLUTION EPIDURAL; INFILTRATION; PERINEURAL AS NEEDED
Status: DISCONTINUED | OUTPATIENT
Start: 2018-02-11 | End: 2018-02-12 | Stop reason: SURG

## 2018-02-11 RX ORDER — LIDOCAINE HYDROCHLORIDE AND EPINEPHRINE 15; 5 MG/ML; UG/ML
INJECTION, SOLUTION EPIDURAL AS NEEDED
Status: DISCONTINUED | OUTPATIENT
Start: 2018-02-11 | End: 2018-02-12 | Stop reason: SURG

## 2018-02-11 RX ORDER — LIDOCAINE HYDROCHLORIDE AND EPINEPHRINE 20; 5 MG/ML; UG/ML
INJECTION, SOLUTION EPIDURAL; INFILTRATION; INTRACAUDAL; PERINEURAL AS NEEDED
Status: DISCONTINUED | OUTPATIENT
Start: 2018-02-11 | End: 2018-02-12 | Stop reason: SURG

## 2018-02-11 RX ORDER — ONDANSETRON 2 MG/ML
4 INJECTION INTRAMUSCULAR; INTRAVENOUS ONCE AS NEEDED
Status: DISCONTINUED | OUTPATIENT
Start: 2018-02-11 | End: 2018-02-12 | Stop reason: HOSPADM

## 2018-02-11 RX ORDER — FAMOTIDINE 10 MG/ML
20 INJECTION, SOLUTION INTRAVENOUS ONCE AS NEEDED
Status: DISCONTINUED | OUTPATIENT
Start: 2018-02-11 | End: 2018-02-12 | Stop reason: HOSPADM

## 2018-02-11 RX ORDER — LIDOCAINE HYDROCHLORIDE 15 MG/ML
INJECTION, SOLUTION EPIDURAL; INFILTRATION; INTRACAUDAL; PERINEURAL AS NEEDED
Status: DISCONTINUED | OUTPATIENT
Start: 2018-02-11 | End: 2018-02-12 | Stop reason: SURG

## 2018-02-11 RX ADMIN — ROPIVACAINE HYDROCHLORIDE 4 ML: 2 INJECTION, SOLUTION EPIDURAL; INFILTRATION at 22:45

## 2018-02-11 RX ADMIN — Medication 9 ML/HR: at 15:05

## 2018-02-11 RX ADMIN — SODIUM CHLORIDE, POTASSIUM CHLORIDE, SODIUM LACTATE AND CALCIUM CHLORIDE 125 ML/HR: 600; 310; 30; 20 INJECTION, SOLUTION INTRAVENOUS at 17:36

## 2018-02-11 RX ADMIN — LIDOCAINE HYDROCHLORIDE,EPINEPHRINE BITARTRATE 4 ML: 20; .005 INJECTION, SOLUTION EPIDURAL; INFILTRATION; INTRACAUDAL; PERINEURAL at 22:45

## 2018-02-11 RX ADMIN — LIDOCAINE HYDROCHLORIDE 5 ML: 15 INJECTION, SOLUTION EPIDURAL; INFILTRATION; INTRACAUDAL; PERINEURAL at 20:40

## 2018-02-11 RX ADMIN — VALACYCLOVIR 500 MG: 500 TABLET, FILM COATED ORAL at 08:34

## 2018-02-11 RX ADMIN — SODIUM CHLORIDE, POTASSIUM CHLORIDE, SODIUM LACTATE AND CALCIUM CHLORIDE 125 ML/HR: 600; 310; 30; 20 INJECTION, SOLUTION INTRAVENOUS at 05:45

## 2018-02-11 RX ADMIN — OXYTOCIN 2 MILLI-UNITS/MIN: 10 INJECTION, SOLUTION INTRAMUSCULAR; INTRAVENOUS at 10:51

## 2018-02-11 RX ADMIN — ROPIVACAINE HYDROCHLORIDE 5 ML: 2 INJECTION, SOLUTION EPIDURAL; INFILTRATION at 15:05

## 2018-02-11 RX ADMIN — ROPIVACAINE HYDROCHLORIDE 4 ML: 2 INJECTION, SOLUTION EPIDURAL; INFILTRATION at 15:00

## 2018-02-11 RX ADMIN — LIDOCAINE HYDROCHLORIDE AND EPINEPHRINE 3 ML: 15; 5 INJECTION, SOLUTION EPIDURAL at 14:57

## 2018-02-11 RX ADMIN — SODIUM CHLORIDE, POTASSIUM CHLORIDE, SODIUM LACTATE AND CALCIUM CHLORIDE 999 ML/HR: 600; 310; 30; 20 INJECTION, SOLUTION INTRAVENOUS at 14:30

## 2018-02-11 NOTE — PLAN OF CARE
Problem: Patient Care Overview (Adult)  Goal: Plan of Care Review  Outcome: Ongoing (interventions implemented as appropriate)   02/11/18 1708   Coping/Psychosocial Response Interventions   Plan Of Care Reviewed With patient;spouse   Patient Care Overview   Progress improving   Outcome Evaluation   Outcome Summary/Follow up Plan pitocin infusing, plan for vaginal delivery     Goal: Adult Individualization and Mutuality  Outcome: Ongoing (interventions implemented as appropriate)   02/11/18 0612 02/11/18 1708   Individualization   Patient Specific Preferences skin to skin, delayed bathing for baby, FOB to cut the cord, delayed cord clamping, exclusive breastfeeding, epidural --    Patient Specific Goals patient will attempt to breastfeed infant within 1hr post delivery --    Patient Specific Interventions --  IOL, pitocin infusing, epidural for pain   Mutuality/Individual Preferences   What Anxieties, Fears or Concerns Do You Have About Your Health or Care? --  denies   What Questions Do You Have About Your Health or Care? --  denies   What Information Would Help Us Give You More Personalized Care? --  this is pt's 3rd girl, she had a stillborn boy last year     Goal: Discharge Needs Assessment  Outcome: Ongoing (interventions implemented as appropriate)   02/11/18 0612   Discharge Needs Assessment   Concerns To Be Addressed no discharge needs identified   Readmission Within The Last 30 Days no previous admission in last 30 days   Equipment Needed After Discharge none   Discharge Disposition home or self-care   Current Health   Anticipated Changes Related to Illness none   Self-Care   Equipment Currently Used at Home none   Living Environment   Transportation Available car       Problem: Labor (Cervical Ripen, Induct, Augment) (Adult,Obstetrics,Pediatric)  Goal: Signs and Symptoms of Listed Potential Problems Will be Absent or Manageable (Labor)  Outcome: Ongoing (interventions implemented as appropriate)   02/11/18     Labor (Cervical Ripen, Induct, Augment)   Problems Assessed (Labor) all   Problems Present (Labor) none       Problem: Fall Risk  (Adult,Obstetrics,Pediatric)  Goal: Identify Related Risk Factors and Signs and Symptoms  Outcome: Ongoing (interventions implemented as appropriate)   18   Fall Risk    Fall Risk: Related Risk Factors regional anesthesia    Fall Risk: Signs and Symptoms presence of fall risk factors     Goal: Absence of Maternal Fall  Outcome: Ongoing (interventions implemented as appropriate)   18   Fall Risk  (Adult,Obstetrics,Pediatric)   Absence of Maternal Fall achieves outcome       Problem: Anesthesia/Analgesia, Neuraxial (Obstetrics)  Goal: Signs and Symptoms of Listed Potential Problems Will be Absent or Manageable (Anesthesia/Analgesia, Neuraxial)  Outcome: Ongoing (interventions implemented as appropriate)   18   Anesthesia/Analgesia, Neuraxial   Problems Assessed (Neuraxial Anesthesia/Analgesia, OB) all   Problems Present (Neuraxial Anesthesia/Analgesia, OB) none

## 2018-02-11 NOTE — PROGRESS NOTES
Patient has made change to 2 cm/50%/-3.  Category 1 FHT.  AROM performed with return of clear fluid. IUPC placed without difficulty.  Continue with pitocin induction.

## 2018-02-11 NOTE — ANESTHESIA PREPROCEDURE EVALUATION
Anesthesia Evaluation     Patient summary reviewed and Nursing notes reviewed   no history of anesthetic complications:  NPO Solid Status: > 6 hours  NPO Liquid Status: > 6 hours           Airway   Mallampati: III  TM distance: <3 FB  Neck ROM: full  no difficulty expected  Dental - normal exam     Pulmonary - normal exam    breath sounds clear to auscultation  (+) asthma,   (-) rhonchi, decreased breath sounds, wheezes, rales, stridor  Cardiovascular - negative cardio ROS and normal exam    Rhythm: regular  Rate: normal    (-) murmur, weak pulses, friction rub, systolic click, carotid bruits, JVD, peripheral edema      Neuro/Psych- negative ROS  GI/Hepatic/Renal/Endo    (+) obesity, morbid obesity,     Musculoskeletal (-) negative ROS    Abdominal  - normal exam  (+) obese,     Abdomen: soft.   Substance History - negative use     OB/GYN    (+) Pregnant,         Other - negative ROS                     Anesthesia Plan    ASA 3     epidural     intravenous induction   Anesthetic plan and risks discussed with patient.

## 2018-02-11 NOTE — ANESTHESIA PROCEDURE NOTES
Labor Epidural    Patient location during procedure: OB  Indication:at surgeon's request  Performed By  Anesthesiologist: KASSIE TAN  Preanesthetic Checklist  Completed: patient identified, site marked, surgical consent, pre-op evaluation, timeout performed, IV checked, risks and benefits discussed and monitors and equipment checked  Prep:  Pt Position:sitting  Sterile Tech:cap, gloves, mask and sterile barrier  Prep:chlorhexidine gluconate and isopropyl alcohol  Monitoring:blood pressure monitoring and EKG  Epidural Block Procedure:  Approach:midline  Guidance:landmark technique  Location:L2-L3  Needle Type:Tuohy  Needle Gauge:17  Loss of Resistance Medium: air  Cath Depth at skin:12 cm  Paresthesia: none  Aspiration:negative  Test Dose:negative  Number of Attempts: 1  Post Assessment:  Dressing:occlusive dressing applied, secured with tape and biopatch applied  Pt Tolerance:patient tolerated the procedure well with no apparent complications  Complications:no
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
## 2018-02-11 NOTE — PLAN OF CARE
Problem: Patient Care Overview (Adult)  Goal: Plan of Care Review  Outcome: Ongoing (interventions implemented as appropriate)   02/11/18 0612   Coping/Psychosocial Response Interventions   Plan Of Care Reviewed With patient;significant other   Patient Care Overview   Progress improving     Goal: Adult Individualization and Mutuality  Outcome: Ongoing (interventions implemented as appropriate)   02/11/18 0612   Individualization   Patient Specific Preferences skin to skin, delayed bathing for baby, FOB to cut the cord, delayed cord clamping, exclusive breastfeeding, epidural   Patient Specific Goals patient will attempt to breastfeed infant within 1hr post delivery   Mutuality/Individual Preferences   What Anxieties, Fears or Concerns Do You Have About Your Health or Care? pain     Goal: Discharge Needs Assessment  Outcome: Ongoing (interventions implemented as appropriate)   02/11/18 0612   Discharge Needs Assessment   Concerns To Be Addressed no discharge needs identified   Readmission Within The Last 30 Days no previous admission in last 30 days   Equipment Needed After Discharge none   Discharge Disposition home or self-care   Current Health   Anticipated Changes Related to Illness none   Self-Care   Equipment Currently Used at Home none   Living Environment   Transportation Available car       Problem: Labor (Cervical Ripen, Induct, Augment) (Adult,Obstetrics,Pediatric)  Goal: Signs and Symptoms of Listed Potential Problems Will be Absent or Manageable (Labor)  Outcome: Ongoing (interventions implemented as appropriate)   02/11/18 0612   Labor (Cervical Ripen, Induct, Augment)   Problems Assessed (Labor) all   Problems Present (Labor) none

## 2018-02-12 PROCEDURE — 88307 TISSUE EXAM BY PATHOLOGIST: CPT

## 2018-02-12 PROCEDURE — 59409 OBSTETRICAL CARE: CPT | Performed by: OBSTETRICS & GYNECOLOGY

## 2018-02-12 RX ORDER — PROMETHAZINE HYDROCHLORIDE 25 MG/1
25 TABLET ORAL EVERY 6 HOURS PRN
Status: DISCONTINUED | OUTPATIENT
Start: 2018-02-12 | End: 2018-02-14 | Stop reason: HOSPADM

## 2018-02-12 RX ORDER — ERYTHROMYCIN 5 MG/G
OINTMENT OPHTHALMIC
Status: DISPENSED
Start: 2018-02-12 | End: 2018-02-12

## 2018-02-12 RX ORDER — OXYTOCIN-SODIUM CHLORIDE 0.9% IV SOLN 30 UNIT/500ML 30-0.9/5 UT/ML-%
999 SOLUTION INTRAVENOUS ONCE
Status: COMPLETED | OUTPATIENT
Start: 2018-02-12 | End: 2018-02-12

## 2018-02-12 RX ORDER — ACETAMINOPHEN 325 MG/1
650 TABLET ORAL EVERY 4 HOURS PRN
Status: DISCONTINUED | OUTPATIENT
Start: 2018-02-12 | End: 2018-02-14 | Stop reason: HOSPADM

## 2018-02-12 RX ORDER — METHYLERGONOVINE MALEATE 0.2 MG/ML
200 INJECTION INTRAVENOUS ONCE AS NEEDED
Status: DISCONTINUED | OUTPATIENT
Start: 2018-02-12 | End: 2018-02-12 | Stop reason: HOSPADM

## 2018-02-12 RX ORDER — SUFENTANIL CITRATE 50 UG/ML
INJECTION EPIDURAL; INTRAVENOUS AS NEEDED
Status: DISCONTINUED | OUTPATIENT
Start: 2018-02-12 | End: 2018-02-12 | Stop reason: SURG

## 2018-02-12 RX ORDER — SUFENTANIL CITRATE 50 UG/ML
INJECTION EPIDURAL; INTRAVENOUS
Status: DISPENSED
Start: 2018-02-12 | End: 2018-02-12

## 2018-02-12 RX ORDER — ONDANSETRON 4 MG/1
4 TABLET, FILM COATED ORAL EVERY 6 HOURS PRN
Status: DISCONTINUED | OUTPATIENT
Start: 2018-02-12 | End: 2018-02-14 | Stop reason: HOSPADM

## 2018-02-12 RX ORDER — ONDANSETRON 4 MG/1
4 TABLET, ORALLY DISINTEGRATING ORAL EVERY 6 HOURS PRN
Status: DISCONTINUED | OUTPATIENT
Start: 2018-02-12 | End: 2018-02-14 | Stop reason: HOSPADM

## 2018-02-12 RX ORDER — PROMETHAZINE HYDROCHLORIDE 25 MG/1
12.5 SUPPOSITORY RECTAL EVERY 6 HOURS PRN
Status: DISCONTINUED | OUTPATIENT
Start: 2018-02-12 | End: 2018-02-14 | Stop reason: HOSPADM

## 2018-02-12 RX ORDER — MISOPROSTOL 200 UG/1
800 TABLET ORAL AS NEEDED
Status: DISCONTINUED | OUTPATIENT
Start: 2018-02-12 | End: 2018-02-12 | Stop reason: HOSPADM

## 2018-02-12 RX ORDER — LANOLIN 100 %
OINTMENT (GRAM) TOPICAL
Status: DISCONTINUED | OUTPATIENT
Start: 2018-02-12 | End: 2018-02-14 | Stop reason: HOSPADM

## 2018-02-12 RX ORDER — OXYTOCIN-SODIUM CHLORIDE 0.9% IV SOLN 30 UNIT/500ML 30-0.9/5 UT/ML-%
250 SOLUTION INTRAVENOUS CONTINUOUS
Status: DISCONTINUED | OUTPATIENT
Start: 2018-02-12 | End: 2018-02-12

## 2018-02-12 RX ORDER — IBUPROFEN 600 MG/1
600 TABLET ORAL EVERY 8 HOURS PRN
Status: DISCONTINUED | OUTPATIENT
Start: 2018-02-12 | End: 2018-02-14 | Stop reason: HOSPADM

## 2018-02-12 RX ORDER — CARBOPROST TROMETHAMINE 250 UG/ML
250 INJECTION, SOLUTION INTRAMUSCULAR AS NEEDED
Status: DISCONTINUED | OUTPATIENT
Start: 2018-02-12 | End: 2018-02-12 | Stop reason: HOSPADM

## 2018-02-12 RX ORDER — BISACODYL 10 MG
10 SUPPOSITORY, RECTAL RECTAL DAILY PRN
Status: DISCONTINUED | OUTPATIENT
Start: 2018-02-13 | End: 2018-02-14 | Stop reason: HOSPADM

## 2018-02-12 RX ORDER — PROMETHAZINE HYDROCHLORIDE 25 MG/ML
12.5 INJECTION, SOLUTION INTRAMUSCULAR; INTRAVENOUS EVERY 6 HOURS PRN
Status: DISCONTINUED | OUTPATIENT
Start: 2018-02-12 | End: 2018-02-14 | Stop reason: HOSPADM

## 2018-02-12 RX ORDER — OXYTOCIN-SODIUM CHLORIDE 0.9% IV SOLN 30 UNIT/500ML 30-0.9/5 UT/ML-%
125 SOLUTION INTRAVENOUS CONTINUOUS PRN
Status: COMPLETED | OUTPATIENT
Start: 2018-02-12 | End: 2018-02-12

## 2018-02-12 RX ORDER — PHYTONADIONE 1 MG/.5ML
INJECTION, EMULSION INTRAMUSCULAR; INTRAVENOUS; SUBCUTANEOUS
Status: DISPENSED
Start: 2018-02-12 | End: 2018-02-12

## 2018-02-12 RX ORDER — DOCUSATE SODIUM 100 MG/1
100 CAPSULE, LIQUID FILLED ORAL 2 TIMES DAILY PRN
Status: DISCONTINUED | OUTPATIENT
Start: 2018-02-12 | End: 2018-02-14 | Stop reason: HOSPADM

## 2018-02-12 RX ORDER — HYDROCODONE BITARTRATE AND ACETAMINOPHEN 5; 325 MG/1; MG/1
1 TABLET ORAL EVERY 4 HOURS PRN
Status: DISCONTINUED | OUTPATIENT
Start: 2018-02-12 | End: 2018-02-14 | Stop reason: HOSPADM

## 2018-02-12 RX ORDER — ONDANSETRON 2 MG/ML
4 INJECTION INTRAMUSCULAR; INTRAVENOUS EVERY 6 HOURS PRN
Status: DISCONTINUED | OUTPATIENT
Start: 2018-02-12 | End: 2018-02-14 | Stop reason: HOSPADM

## 2018-02-12 RX ORDER — SODIUM CHLORIDE 0.9 % (FLUSH) 0.9 %
1-10 SYRINGE (ML) INJECTION AS NEEDED
Status: DISCONTINUED | OUTPATIENT
Start: 2018-02-12 | End: 2018-02-14 | Stop reason: HOSPADM

## 2018-02-12 RX ORDER — PRENATAL VIT NO.126/IRON/FOLIC 28MG-0.8MG
1 TABLET ORAL DAILY
Status: DISCONTINUED | OUTPATIENT
Start: 2018-02-12 | End: 2018-02-14 | Stop reason: HOSPADM

## 2018-02-12 RX ORDER — ZOLPIDEM TARTRATE 5 MG/1
5 TABLET ORAL NIGHTLY PRN
Status: DISCONTINUED | OUTPATIENT
Start: 2018-02-12 | End: 2018-02-14 | Stop reason: HOSPADM

## 2018-02-12 RX ADMIN — Medication 1 TABLET: at 10:23

## 2018-02-12 RX ADMIN — SODIUM CHLORIDE, POTASSIUM CHLORIDE, SODIUM LACTATE AND CALCIUM CHLORIDE 999 ML/HR: 600; 310; 30; 20 INJECTION, SOLUTION INTRAVENOUS at 00:00

## 2018-02-12 RX ADMIN — IBUPROFEN 600 MG: 600 TABLET ORAL at 05:53

## 2018-02-12 RX ADMIN — ONDANSETRON 4 MG: 4 TABLET, FILM COATED ORAL at 07:55

## 2018-02-12 RX ADMIN — SUFENTANIL CITRATE 20 MCG: 50 INJECTION EPIDURAL; INTRAVENOUS at 02:40

## 2018-02-12 RX ADMIN — IBUPROFEN 600 MG: 600 TABLET ORAL at 13:52

## 2018-02-12 RX ADMIN — HYDROCODONE BITARTRATE AND ACETAMINOPHEN 1 TABLET: 5; 325 TABLET ORAL at 17:00

## 2018-02-12 RX ADMIN — HYDROCODONE BITARTRATE AND ACETAMINOPHEN 1 TABLET: 5; 325 TABLET ORAL at 10:23

## 2018-02-12 RX ADMIN — HYDROCODONE BITARTRATE AND ACETAMINOPHEN 1 TABLET: 5; 325 TABLET ORAL at 05:53

## 2018-02-12 RX ADMIN — OXYTOCIN 250 ML/HR: 10 INJECTION, SOLUTION INTRAMUSCULAR; INTRAVENOUS at 03:55

## 2018-02-12 RX ADMIN — Medication: at 10:23

## 2018-02-12 RX ADMIN — OXYTOCIN 125 ML/HR: 10 INJECTION, SOLUTION INTRAMUSCULAR; INTRAVENOUS at 04:57

## 2018-02-12 RX ADMIN — LIDOCAINE HYDROCHLORIDE 7 ML: 15 INJECTION, SOLUTION EPIDURAL; INFILTRATION; INTRACAUDAL; PERINEURAL at 02:40

## 2018-02-12 RX ADMIN — OXYTOCIN 999 ML/HR: 10 INJECTION, SOLUTION INTRAMUSCULAR; INTRAVENOUS at 03:42

## 2018-02-12 NOTE — CONSULTS
The pt. Had a spiritual care consult that my co-worker accidentally marked as completed before I had the opportunity to visit.  I attempted three times to visit today, but twice the patient was sleeping and once the baby's hearing was being tested.  I'll attempt again tomorrow.    Chaplain Yoly Graham

## 2018-02-12 NOTE — L&D DELIVERY NOTE
[facility]  Vaginal Delivery Note    Delivery     Delivery: Vaginal, Spontaneous Delivery     YOB: 2020    Time of Birth: 3:36 AM      Anesthesia: Epidural     Delivering clinician: Padmini Sims    Forceps?   No   Vacuum? No    Shoulder dystocia present: No        Delivery narrative:  Patient is a 22-year-old  89 that was admitted at 39 weeks for induction of labor for history of stillbirth.  Patient's cervix was 1 cm on admission and she underwent cervical ripening with Cervidil.  She made change to 2 cm and was started on Pitocin.  She had assisted rupture membranes for clear fluid and IUPC was placed.  She was GBS negative.  She received an epidural for pain control.  She progressed along a normal labor curve to complete dilation and +2 station and was feeling a strong urge to push.    With 2 contractions, patient delivered a liveborn female infant in the occiput anterior position over an intact perineum.  With gentle posterior guidance and maternal pushing, the anterior shoulder was easily delivered followed by the remainder of the body.  The infant was immediately vigorous and placed on mom's chest.  Delayed cord clamping was done for 30 seconds and the cord was clamped and cut by the father.  Cord blood was collected and sent.  The placenta then delivered spontaneously and was intact with a three-vessel cord.  Inspection of her perineum revealed no lacerations.  Bimanual exam revealed a firm uterus with no remaining products of conception.  Patient tolerated the procedure well.  All counts were correct.    Infant    Findings: female  infant     Infant observations: Weight: No birth weight on file.   Length:    in  Observations/Comments:         Apgars:    @ 1 minute /       @ 5 minutes   Infant Name:      Placenta, Cord, and Fluid    Placenta delivered  Spontaneous  at     3:39 AM     Cord: 3 vessels  present.   Nuchal Cord?  no   Cord blood obtained: Yes    Cord gases  obtained:  No    Cord gas results: Venous:  No results found for: PHCVEN    Arterial:  No results found for: PHCART     Repair    Episiotomy: None    Lacerations: No   Estimated Blood Loss: 200 mL     Suture used for repair:      Complications  none    Disposition  Mother to Mother Baby/Postpartum  in stable condition currently.  Baby to remains with mom  in stable condition currently.      Padmini Sims MD  02/12/18  4:13 AM

## 2018-02-12 NOTE — LACTATION NOTE
This note was copied from a baby's chart.  P4. Mom is needing help with breastfeeding and has not really been successful in the past. She has had 4 babies in four years. Has a personal pump at home. This baby has a strong suck and mom was encouraged to let her have unrestricted access to breast. Mom states she would like this BF experience to go better than in the past. Has LC #

## 2018-02-12 NOTE — PLAN OF CARE
Problem: Patient Care Overview (Adult)  Goal: Plan of Care Review  Outcome: Outcome(s) achieved Date Met: 18   Coping/Psychosocial Response Interventions   Plan Of Care Reviewed With patient;spouse   Patient Care Overview   Progress improving   Outcome Evaluation   Outcome Summary/Follow up Plan pitocin infusing, plan for vaginal delivery     Goal: Adult Individualization and Mutuality  Outcome: Outcome(s) achieved Date Met: 18   Individualization   Patient Specific Preferences skin to skin, delayed bathing for baby, FOB to cut the cord, delayed cord clamping, exclusive breastfeeding, epidural --    Patient Specific Goals patient will attempt to breastfeed infant within 1hr post delivery --    Patient Specific Interventions --  IOL, pitocin infusing, epidural for pain   Mutuality/Individual Preferences   What Anxieties, Fears or Concerns Do You Have About Your Health or Care? --  denies   What Questions Do You Have About Your Health or Care? --  denies   What Information Would Help Us Give You More Personalized Care? --  this is pt's 3rd girl, she had a stillborn boy last year       Problem: Labor (Cervical Ripen, Induct, Augment) (Adult,Obstetrics,Pediatric)  Goal: Signs and Symptoms of Listed Potential Problems Will be Absent or Manageable (Labor)  Outcome: Outcome(s) achieved Date Met: 18   Labor (Cervical Ripen, Induct, Augment)   Problems Assessed (Labor) all   Problems Present (Labor) none       Problem: Fall Risk  (Adult,Obstetrics,Pediatric)  Goal: Identify Related Risk Factors and Signs and Symptoms  Outcome: Outcome(s) achieved Date Met: 18   Fall Risk    Fall Risk: Related Risk Factors regional anesthesia    Fall Risk: Signs and Symptoms presence of fall risk factors     Goal: Absence of Maternal Fall  Outcome: Outcome(s) achieved Date Met: 18   Fall Risk   (Adult,Obstetrics,Pediatric)   Absence of Maternal Fall achieves outcome     Goal: Absence of [place] Fall/Drop  Outcome: Outcome(s) achieved Date Met: 18 0452   Fall Risk  (Adult,Obstetrics,Pediatric)   Absence of  Fall/Drop achieves outcome       Problem: Anesthesia/Analgesia, Neuraxial (Obstetrics)  Goal: Signs and Symptoms of Listed Potential Problems Will be Absent or Manageable (Anesthesia/Analgesia, Neuraxial)  Outcome: Outcome(s) achieved Date Met: 18 1708   Anesthesia/Analgesia, Neuraxial   Problems Assessed (Neuraxial Anesthesia/Analgesia, OB) all   Problems Present (Neuraxial Anesthesia/Analgesia, OB) none

## 2018-02-13 LAB
BASOPHILS # BLD AUTO: 0.02 10*3/MM3 (ref 0–0.2)
BASOPHILS NFR BLD AUTO: 0.2 % (ref 0–1.5)
DEPRECATED RDW RBC AUTO: 48.2 FL (ref 37–54)
EOSINOPHIL # BLD AUTO: 0.25 10*3/MM3 (ref 0–0.7)
EOSINOPHIL NFR BLD AUTO: 2.2 % (ref 0.3–6.2)
ERYTHROCYTE [DISTWIDTH] IN BLOOD BY AUTOMATED COUNT: 14.7 % (ref 11.7–13)
HCT VFR BLD AUTO: 27.3 % (ref 35.6–45.5)
HGB BLD-MCNC: 8.6 G/DL (ref 11.9–15.5)
IMM GRANULOCYTES # BLD: 0.03 10*3/MM3 (ref 0–0.03)
IMM GRANULOCYTES NFR BLD: 0.3 % (ref 0–0.5)
LYMPHOCYTES # BLD AUTO: 3.85 10*3/MM3 (ref 0.9–4.8)
LYMPHOCYTES NFR BLD AUTO: 34.5 % (ref 19.6–45.3)
MCH RBC QN AUTO: 28.6 PG (ref 26.9–32)
MCHC RBC AUTO-ENTMCNC: 31.5 G/DL (ref 32.4–36.3)
MCV RBC AUTO: 90.7 FL (ref 80.5–98.2)
MONOCYTES # BLD AUTO: 0.98 10*3/MM3 (ref 0.2–1.2)
MONOCYTES NFR BLD AUTO: 8.8 % (ref 5–12)
NEUTROPHILS # BLD AUTO: 6.03 10*3/MM3 (ref 1.9–8.1)
NEUTROPHILS NFR BLD AUTO: 54 % (ref 42.7–76)
PLATELET # BLD AUTO: 196 10*3/MM3 (ref 140–500)
PMV BLD AUTO: 11 FL (ref 6–12)
RBC # BLD AUTO: 3.01 10*6/MM3 (ref 3.9–5.2)
WBC NRBC COR # BLD: 11.16 10*3/MM3 (ref 4.5–10.7)

## 2018-02-13 PROCEDURE — 85025 COMPLETE CBC W/AUTO DIFF WBC: CPT | Performed by: OBSTETRICS & GYNECOLOGY

## 2018-02-13 PROCEDURE — 99231 SBSQ HOSP IP/OBS SF/LOW 25: CPT | Performed by: OBSTETRICS & GYNECOLOGY

## 2018-02-13 RX ADMIN — HYDROCODONE BITARTRATE AND ACETAMINOPHEN 1 TABLET: 5; 325 TABLET ORAL at 08:58

## 2018-02-13 RX ADMIN — BETAMETHASONE VALERATE: 1.2 OINTMENT TOPICAL at 22:54

## 2018-02-13 RX ADMIN — DOCUSATE SODIUM 100 MG: 100 CAPSULE, LIQUID FILLED ORAL at 08:58

## 2018-02-13 RX ADMIN — IBUPROFEN 600 MG: 600 TABLET ORAL at 22:34

## 2018-02-13 RX ADMIN — IBUPROFEN 600 MG: 600 TABLET ORAL at 00:49

## 2018-02-13 RX ADMIN — IBUPROFEN 600 MG: 600 TABLET ORAL at 08:58

## 2018-02-13 RX ADMIN — HYDROCODONE BITARTRATE AND ACETAMINOPHEN 1 TABLET: 5; 325 TABLET ORAL at 00:49

## 2018-02-13 RX ADMIN — HYDROCODONE BITARTRATE AND ACETAMINOPHEN 1 TABLET: 5; 325 TABLET ORAL at 22:34

## 2018-02-13 RX ADMIN — Medication 1 TABLET: at 08:58

## 2018-02-13 RX ADMIN — HYDROCODONE BITARTRATE AND ACETAMINOPHEN 1 TABLET: 5; 325 TABLET ORAL at 15:50

## 2018-02-13 NOTE — LACTATION NOTE
This note was copied from a baby's chart.  Called for latch assist. Mom independently latched baby deeply and she nursed well with good jaw rotation and swallows noted and her nipple was well rounded after the feed. Also experienced cramping during the feed. Will call for further assist. She is determined and hopes to be more successful with breast feeding this time around.

## 2018-02-13 NOTE — PROGRESS NOTES
Postpartum Progress Note      Status post Vaginal Delivery: Doing well postoperatively.     1) postpartum care immediately following delivery : Continue routine postpartum care  2) Discharge home tomorrow    Rh status: A positive  Rubella: Immune  Gender: female    Subjective     Postpartum Day 1: Vaginal delivery    The patient feels well. The patient denies emotional concerns. Pain is well controlled with current medications. The baby is well. The patient is ambulating well. The patient is tolerating a normal diet.     Objective     Vital signs in last 24 hours:  Temp:  [97.2 °F (36.2 °C)-98.6 °F (37 °C)] 98.1 °F (36.7 °C)  Heart Rate:  [67-80] 80  Resp:  [18] 18  BP: ()/(54-77) 119/77      General:    alert, appears stated age and cooperative   Abdomen:  Soft, Non-tender    Lochia:  appropriate   Uterine Fundus:   firm   Ext    Edema 1+   DVT Evaluation:  No evidence of DVT seen on physical exam.     Lab Results   Component Value Date    WBC 11.16 (H) 02/13/2018    HGB 8.6 (L) 02/13/2018    HCT 27.3 (L) 02/13/2018    MCV 90.7 02/13/2018     02/13/2018       Padmini Sims MD  2/13/2018  10:04 AM

## 2018-02-13 NOTE — LACTATION NOTE
This note was copied from a baby's chart.  Mom c/o pain with breast feeding and will call today for check.

## 2018-02-13 NOTE — PLAN OF CARE
Problem: Patient Care Overview (Adult)  Goal: Discharge Needs Assessment  Outcome: Ongoing (interventions implemented as appropriate)   02/13/18 0013   Discharge Needs Assessment   Concerns To Be Addressed no discharge needs identified   Readmission Within The Last 30 Days no previous admission in last 30 days   Equipment Needed After Discharge none   Discharge Disposition home or self-care   Current Health   Anticipated Changes Related to Illness none   Self-Care   Equipment Currently Used at Home none   Living Environment   Transportation Available car       Problem: Postpartum, Vaginal Delivery (Adult)  Goal: Signs and Symptoms of Listed Potential Problems Will be Absent or Manageable (Postpartum, Vaginal Delivery)  Outcome: Ongoing (interventions implemented as appropriate)

## 2018-02-14 VITALS
OXYGEN SATURATION: 98 % | HEIGHT: 65 IN | RESPIRATION RATE: 18 BRPM | SYSTOLIC BLOOD PRESSURE: 99 MMHG | DIASTOLIC BLOOD PRESSURE: 64 MMHG | WEIGHT: 288.6 LBS | TEMPERATURE: 97.8 F | BODY MASS INDEX: 48.08 KG/M2 | HEART RATE: 76 BPM

## 2018-02-14 PROCEDURE — 99238 HOSP IP/OBS DSCHRG MGMT 30/<: CPT | Performed by: OBSTETRICS & GYNECOLOGY

## 2018-02-14 RX ORDER — HYDROCODONE BITARTRATE AND ACETAMINOPHEN 5; 325 MG/1; MG/1
1 TABLET ORAL EVERY 4 HOURS PRN
Qty: 15 TABLET | Refills: 0 | Status: SHIPPED | OUTPATIENT
Start: 2018-02-14 | End: 2018-02-22

## 2018-02-14 RX ADMIN — HYDROCODONE BITARTRATE AND ACETAMINOPHEN 1 TABLET: 5; 325 TABLET ORAL at 08:40

## 2018-02-14 RX ADMIN — IBUPROFEN 600 MG: 600 TABLET ORAL at 08:40

## 2018-02-14 RX ADMIN — Medication 1 TABLET: at 08:40

## 2018-02-14 RX ADMIN — HYDROCODONE BITARTRATE AND ACETAMINOPHEN 1 TABLET: 5; 325 TABLET ORAL at 02:47

## 2018-02-14 RX ADMIN — DOCUSATE SODIUM 100 MG: 100 CAPSULE, LIQUID FILLED ORAL at 08:40

## 2018-02-14 NOTE — PROGRESS NOTES
"Subjective:    Cc:  Postpartum    Postpartum Day 2:     The patient feels well.  Pain is well controlled with current medications. The baby is well.  Urinary output is adequate. The patient is ambulating well. The patient is tolerating a normal diet. Flatus has been passed.      Objective:    Vital signs in last 24 hours:  Blood pressure 99/64, pulse 76, temperature 97.8 °F (36.6 °C), temperature source Oral, resp. rate 18, height 165.1 cm (65\"), weight 131 kg (288 lb 9.6 oz), last menstrual period 05/05/2017, SpO2 98 %, currently breastfeeding.      General:    alert, appears stated age and cooperative   Uterine Fundus:   firm, nontender     Labs    Rh + / Female infant    Assessment/Plan:.     Postpartum Day #2  - Discharge home.  Reviewed instructions, follow up and contraception.    Danial Cohen MD    "

## 2018-02-19 ENCOUNTER — TELEPHONE (OUTPATIENT)
Dept: OBSTETRICS AND GYNECOLOGY | Facility: CLINIC | Age: 23
End: 2018-02-19

## 2018-02-19 RX ORDER — VITAMIN A ACETATE, BETA CAROTENE, ASCORBIC ACID, CHOLECALCIFEROL, .ALPHA.-TOCOPHEROL ACETATE, DL-, THIAMINE MONONITRATE, RIBOFLAVIN, NIACINAMIDE, PYRIDOXINE HYDROCHLORIDE, FOLIC ACID, CYANOCOBALAMIN, CALCIUM CARBONATE, FERROUS FUMARATE, ZINC OXIDE, CUPRIC OXIDE 3080; 12; 120; 400; 1; 1.84; 3; 20; 22; 920; 25; 200; 27; 10; 2 [IU]/1; UG/1; MG/1; [IU]/1; MG/1; MG/1; MG/1; MG/1; MG/1; [IU]/1; MG/1; MG/1; MG/1; MG/1; MG/1
1 TABLET, FILM COATED ORAL DAILY
Qty: 30 TABLET | Refills: 6 | Status: SHIPPED | OUTPATIENT
Start: 2018-02-19 | End: 2019-03-05

## 2018-02-19 NOTE — TELEPHONE ENCOUNTER
Rx sent for prenatal vitamins and she can try oatmeal or OTC fenugreek supplements to help boost milk supply but the best thing is to stimulate her breasts every hour for several hours a day for 20 minutes, even if she is not getting any milk.  The more she stimulates her breasts, the more milk her brain is being told to produce.    ----- Message from Ana M Yo sent at 2/19/2018  8:11 AM EST -----  Contact: pt  PP pt called stating she is having trouble producing breast milk. She is asking for a RX to help and any suggestions. Pt also states she was never prescribed prenatal vitamins and is asking for a RX.    Please advise    PT# is [phone]

## 2018-04-02 ENCOUNTER — POSTPARTUM VISIT (OUTPATIENT)
Dept: OBSTETRICS AND GYNECOLOGY | Facility: CLINIC | Age: 23
End: 2018-04-02

## 2018-04-02 VITALS
WEIGHT: 270 LBS | SYSTOLIC BLOOD PRESSURE: 104 MMHG | BODY MASS INDEX: 44.98 KG/M2 | HEART RATE: 56 BPM | HEIGHT: 65 IN | DIASTOLIC BLOOD PRESSURE: 71 MMHG

## 2018-04-02 DIAGNOSIS — Z30.011 ENCOUNTER FOR INITIAL PRESCRIPTION OF CONTRACEPTIVE PILLS: ICD-10-CM

## 2018-04-02 DIAGNOSIS — Z11.3 SCREEN FOR STD (SEXUALLY TRANSMITTED DISEASE): ICD-10-CM

## 2018-04-02 PROCEDURE — 99213 OFFICE O/P EST LOW 20 MIN: CPT | Performed by: OBSTETRICS & GYNECOLOGY

## 2018-04-02 RX ORDER — ACETAMINOPHEN AND CODEINE PHOSPHATE 120; 12 MG/5ML; MG/5ML
1 SOLUTION ORAL DAILY
Qty: 28 TABLET | Refills: 12 | Status: SHIPPED | OUTPATIENT
Start: 2018-04-02 | End: 2019-03-05

## 2018-04-02 RX ORDER — FLUTICASONE PROPIONATE 50 MCG
SPRAY, SUSPENSION (ML) NASAL
Refills: 0 | COMMUNITY
Start: 2018-03-27 | End: 2019-03-05

## 2018-04-02 NOTE — PROGRESS NOTES
Subjective   Krishna Clarke is a 23 y.o. female here for 6 week post partum exam.     History of Present Illness   Patient is a 23-year-old  who is 6 weeks status post vaginal delivery at term.  Patient is currently breast-feeding.  She does complain of recently having a decreased milk supply.  Patient is wanting to do a tea detox that she saw on Facebook.  Patient was recently sexually active 3 days ago.  She is wanting to start the birth control pill for contraception.  She denies any problems with postpartum depression or anxiety.    The following portions of the patient's history were reviewed and updated as appropriate: allergies, current medications, past family history, past medical history, past social history, past surgical history and problem list.    Review of Systems   Gastrointestinal: Negative for abdominal pain.   Genitourinary: Negative for pelvic pain.   All other systems reviewed and are negative.      Objective   Physical Exam  Physical Exam   Constitutional: She appears well-developed and well-nourished.   Cardiovascular: Normal rate and regular rhythm.    Pulmonary/Chest: Effort normal and breath sounds normal. Right breast exhibits no inverted nipple, no mass, no nipple discharge, no skin change and no tenderness. Left breast exhibits no inverted nipple, no mass, no nipple discharge, no skin change and no tenderness.   Abdominal: Soft. She exhibits no distension. There is no tenderness.   Genitourinary: Vagina normal and uterus normal. There is no rash, tenderness, lesion or injury on the right labia. There is no rash, tenderness, lesion or injury on the left labia. Cervix exhibits no motion tenderness, no discharge and no friability. Right adnexum displays no mass, no tenderness and no fullness. Left adnexum displays no mass, no tenderness and no fullness.   Neurological: She is alert.   Psychiatric: She has a normal mood and affect.   Vitals reviewed.      Assessment/Plan   Krishna was  seen today for postpartum care.    Diagnoses and all orders for this visit:    Routine postpartum follow-up    Screen for STD (sexually transmitted disease)  -     RPR, Rfx Qn RPR / Confirm TP  -     Hepatitis B Surface Antigen  -     Hepatitis C Antibody  -     HIV-1 / O / 2 Ag / Antibody 4th Generation  -     Chlamydia trachomatis, Neisseria gonorrhoeae, Trichomonas vaginalis, PCR - Swab, Vagina    Encounter for initial prescription of contraceptive pills  -     norethindrone (ORTHO MICRONOR) 0.35 MG tablet; Take 1 tablet by mouth Daily.      Prescription was sent for birth control and patient was advised that she may start it after 2 weeks of abstinence.  Discussed ways to increase milk supply including fenugreek and increasing frequency of pumping sessions.  Patient advised not to do the tea detox that she saw on Facebook as it may decrease her milk supply even more.  Patient may follow-up in 1 year for next annual exam or sooner if needed.

## 2018-04-04 LAB
C TRACH RRNA SPEC QL NAA+PROBE: NEGATIVE
N GONORRHOEA RRNA SPEC QL NAA+PROBE: NEGATIVE
T VAGINALIS RRNA SPEC QL NAA+PROBE: NEGATIVE

## 2019-03-05 ENCOUNTER — OFFICE VISIT (OUTPATIENT)
Dept: OBSTETRICS AND GYNECOLOGY | Facility: CLINIC | Age: 24
End: 2019-03-05

## 2019-03-05 ENCOUNTER — TELEPHONE (OUTPATIENT)
Dept: OBSTETRICS AND GYNECOLOGY | Facility: CLINIC | Age: 24
End: 2019-03-05

## 2019-03-05 VITALS — BODY MASS INDEX: 41.6 KG/M2 | DIASTOLIC BLOOD PRESSURE: 82 MMHG | WEIGHT: 250 LBS | SYSTOLIC BLOOD PRESSURE: 118 MMHG

## 2019-03-05 DIAGNOSIS — N92.0 MENORRHAGIA WITH REGULAR CYCLE: Primary | ICD-10-CM

## 2019-03-05 LAB
B-HCG UR QL: NEGATIVE
INTERNAL NEGATIVE CONTROL: NEGATIVE
INTERNAL POSITIVE CONTROL: POSITIVE
Lab: NORMAL

## 2019-03-05 PROCEDURE — 81025 URINE PREGNANCY TEST: CPT | Performed by: OBSTETRICS & GYNECOLOGY

## 2019-03-05 PROCEDURE — 99214 OFFICE O/P EST MOD 30 MIN: CPT | Performed by: OBSTETRICS & GYNECOLOGY

## 2019-03-05 RX ORDER — MEDROXYPROGESTERONE ACETATE 10 MG/1
10 TABLET ORAL DAILY
Qty: 10 TABLET | Refills: 0 | OUTPATIENT
Start: 2019-03-05 | End: 2019-09-24

## 2019-03-05 NOTE — TELEPHONE ENCOUNTER
Pt left the office without getting labs or scheduling next appointment. The medical assistant left a voicemail for the patient to call the office.

## 2019-03-05 NOTE — TELEPHONE ENCOUNTER
She can see me today at [facility] if she is able to    Pt said she has had some irregular periods since she delivered her baby last year. This period varies from light to heavy. At this time her flow is heavy. She has changed her pad twice since 6 am and has had some clotting as well as dizziness. I scheduled her for tomorrow but if you would like to see her sooner, I will be happy to call and see if she can come see you today. Please advise.

## 2019-03-05 NOTE — PROGRESS NOTES
Subjective   Krishna Clarke is a 23 y.o. female here for heavy periods. Pt c/.o dizziness and heavy bleeding.       History of Present Illness   Patient presents today with new complaint of heavy period.  She started her period two days ago and reports large clots and feeling dizzy and fatigued.  She reports having regular cycles for the last three months, but states her last two periods were not heavy.  Patient is breastfeeding her 1 year old daughter and did not have a period for 7 months while breastfeeding.  She has began to wean breastfeeding and period returned.  She also experienced pelvic cramping yesterday.  She declines contraception as she is not currently sexually active.    The following portions of the patient's history were reviewed and updated as appropriate: allergies, current medications, past family history, past medical history, past social history, past surgical history and problem list.    Review of Systems   Constitutional: Positive for fatigue.   Genitourinary: Positive for menstrual problem.   All other systems reviewed and are negative.      Objective   Physical Exam   Constitutional: She appears well-developed and well-nourished.   Cardiovascular: Normal rate and regular rhythm.   Pulmonary/Chest: Effort normal and breath sounds normal.   Abdominal: Soft. She exhibits no distension. There is no tenderness.   Genitourinary: Uterus normal and cervix normal. There is no rash, tenderness or lesion on the right labia. There is no rash, tenderness or lesion on the left labia. Right adnexum displays no mass, no tenderness and no fullness. Left adnexum displays no mass, no tenderness and no fullness. There is bleeding in the vagina. No tenderness in the vagina. No foreign body in the vagina. No signs of injury around the vagina. No vaginal discharge found.   Neurological: She is alert.   Psychiatric: She has a normal mood and affect.   Vitals reviewed.        Assessment/Plan   Krishna was seen  today for menstrual problem.    Diagnoses and all orders for this visit:    Menorrhagia with regular cycle  -     CBC (No Diff)  -     TSH Rfx On Abnormal To Free T4  -     NuSwab VG+ - Swab, Vagina  -     medroxyPROGESTERone (PROVERA) 10 MG tablet; Take 1 tablet by mouth Daily.  -     POC Pregnancy, Urine      Patient had moderate amount of bleeding on exam today.  Urine pregnancy test was negative.  CBC and TSH ordered along with pelvic ultrasound.  Course of provera was sent to help decrease this episode of bleeding.  Patient currently declines contraception but if periods continue to be heavy, recommend OCP, depo provera, or IUD for more long term management.

## 2019-03-06 LAB
ERYTHROCYTE [DISTWIDTH] IN BLOOD BY AUTOMATED COUNT: 13.5 % (ref 12.3–15.4)
HCT VFR BLD AUTO: 39.3 % (ref 34–46.6)
HGB BLD-MCNC: 12 G/DL (ref 12–15.9)
MCH RBC QN AUTO: 28.6 PG (ref 26.6–33)
MCHC RBC AUTO-ENTMCNC: 30.5 G/DL (ref 31.5–35.7)
MCV RBC AUTO: 93.6 FL (ref 79–97)
PLATELET # BLD AUTO: 274 10*3/MM3 (ref 140–450)
RBC # BLD AUTO: 4.2 10*6/MM3 (ref 3.77–5.28)
TSH SERPL DL<=0.005 MIU/L-ACNC: 1.19 MIU/ML (ref 0.27–4.2)
WBC # BLD AUTO: 7.75 10*3/MM3 (ref 3.4–10.8)

## 2019-03-07 LAB
A VAGINAE DNA VAG QL NAA+PROBE: ABNORMAL SCORE
BVAB2 DNA VAG QL NAA+PROBE: ABNORMAL SCORE
C ALBICANS DNA VAG QL NAA+PROBE: NEGATIVE
C GLABRATA DNA VAG QL NAA+PROBE: NEGATIVE
C TRACH RRNA SPEC QL NAA+PROBE: NEGATIVE
MEGA1 DNA VAG QL NAA+PROBE: ABNORMAL SCORE
N GONORRHOEA RRNA SPEC QL NAA+PROBE: NEGATIVE
T VAGINALIS RRNA SPEC QL NAA+PROBE: NEGATIVE

## 2019-03-08 ENCOUNTER — TELEPHONE (OUTPATIENT)
Dept: OBSTETRICS AND GYNECOLOGY | Facility: CLINIC | Age: 24
End: 2019-03-08

## 2019-03-08 RX ORDER — METRONIDAZOLE 500 MG/1
500 TABLET ORAL 2 TIMES DAILY
Qty: 14 TABLET | Refills: 0 | Status: SHIPPED | OUTPATIENT
Start: 2019-03-08 | End: 2019-03-15

## 2019-03-08 NOTE — TELEPHONE ENCOUNTER
----- Message from Padmini Sims MD sent at 3/8/2019  8:46 AM EST -----  Let patient know that her vaginal culture was positive for bacterial vaginosis and prescription has been sent.

## 2019-03-11 RX ORDER — PRENATAL WITH FERROUS FUM AND FOLIC ACID 3080; 920; 120; 400; 22; 1.84; 3; 20; 10; 1; 12; 200; 27; 25; 2 [IU]/1; [IU]/1; MG/1; [IU]/1; MG/1; MG/1; MG/1; MG/1; MG/1; MG/1; UG/1; MG/1; MG/1; MG/1; MG/1
1 TABLET ORAL DAILY
Qty: 30 TABLET | Refills: 11 | Status: SHIPPED | OUTPATIENT
Start: 2019-03-11 | End: 2019-04-10

## 2019-05-21 ENCOUNTER — HOSPITAL ENCOUNTER (EMERGENCY)
Facility: HOSPITAL | Age: 24
Discharge: HOME | End: 2019-05-22
Attending: FAMILY MEDICINE | Admitting: FAMILY MEDICINE

## 2019-05-21 DIAGNOSIS — K14.6 GLOSSALGIA: Primary | ICD-10-CM

## 2019-05-21 PROCEDURE — 99283 EMERGENCY DEPT VISIT LOW MDM: CPT

## 2019-05-22 VITALS
OXYGEN SATURATION: 99 % | WEIGHT: 245 LBS | RESPIRATION RATE: 20 BRPM | SYSTOLIC BLOOD PRESSURE: 96 MMHG | BODY MASS INDEX: 40.82 KG/M2 | TEMPERATURE: 97.5 F | HEIGHT: 65 IN | HEART RATE: 53 BPM | DIASTOLIC BLOOD PRESSURE: 64 MMHG

## 2019-05-22 RX ADMIN — NYSTATIN 5 ML: 100000 SUSPENSION ORAL at 01:15

## 2019-07-20 PROCEDURE — 99284 EMERGENCY DEPT VISIT MOD MDM: CPT

## 2019-07-21 ENCOUNTER — HOSPITAL ENCOUNTER (EMERGENCY)
Facility: HOSPITAL | Age: 24
Discharge: HOME | End: 2019-07-21
Attending: FAMILY MEDICINE | Admitting: FAMILY MEDICINE

## 2019-07-21 VITALS
OXYGEN SATURATION: 99 % | BODY MASS INDEX: 41.65 KG/M2 | DIASTOLIC BLOOD PRESSURE: 67 MMHG | SYSTOLIC BLOOD PRESSURE: 108 MMHG | WEIGHT: 250 LBS | HEIGHT: 65 IN | RESPIRATION RATE: 18 BRPM | TEMPERATURE: 98.1 F | HEART RATE: 48 BPM

## 2019-07-21 DIAGNOSIS — R10.84 GENERALIZED ABDOMINAL PAIN: Primary | ICD-10-CM

## 2019-07-21 LAB
ALBUMIN SERPL-MCNC: 3.7 G/DL (ref 3.5–5.2)
ALBUMIN/GLOB SERPL: 1.1 G/DL
ALP SERPL-CCNC: 49 U/L (ref 39–117)
ALT SERPL W P-5'-P-CCNC: 17 U/L (ref 1–33)
ANION GAP SERPL CALCULATED.3IONS-SCNC: 11 MMOL/L (ref 5–15)
AST SERPL-CCNC: 20 U/L (ref 1–32)
BACTERIA UR QL AUTO: ABNORMAL /HPF
BILIRUB SERPL-MCNC: 0.3 MG/DL (ref 0.2–1.2)
BILIRUB UR QL STRIP: NEGATIVE
BUN BLD-MCNC: 13 MG/DL (ref 6–20)
BUN/CREAT SERPL: 14.1 (ref 7–25)
CALCIUM SPEC-SCNC: 8.9 MG/DL (ref 8.6–10.5)
CHLORIDE SERPL-SCNC: 104 MMOL/L (ref 98–107)
CLARITY UR: CLEAR
CO2 SERPL-SCNC: 26 MMOL/L (ref 22–29)
COLOR UR: YELLOW
CREAT BLD-MCNC: 0.92 MG/DL (ref 0.57–1)
DEPRECATED RDW RBC AUTO: 41.6 FL (ref 37–54)
EOSINOPHIL # BLD MANUAL: 0.08 10*3/MM3 (ref 0–0.4)
EOSINOPHIL NFR BLD MANUAL: 1 % (ref 0.3–6.2)
ERYTHROCYTE [DISTWIDTH] IN BLOOD BY AUTOMATED COUNT: 13.1 % (ref 12.3–15.4)
GFR SERPL CREATININE-BSD FRML MDRD: 89 ML/MIN/1.73
GLOBULIN UR ELPH-MCNC: 3.3 GM/DL
GLUCOSE BLD-MCNC: 98 MG/DL (ref 65–99)
GLUCOSE UR STRIP-MCNC: NEGATIVE MG/DL
HCG SERPL QL: NEGATIVE
HCT VFR BLD AUTO: 33.4 % (ref 34–46.6)
HGB BLD-MCNC: 10.9 G/DL (ref 12–15.9)
HGB UR QL STRIP.AUTO: ABNORMAL
HYALINE CASTS UR QL AUTO: ABNORMAL /LPF
KETONES UR QL STRIP: NEGATIVE
LEUKOCYTE ESTERASE UR QL STRIP.AUTO: NEGATIVE
LYMPHOCYTES # BLD MANUAL: 3.99 10*3/MM3 (ref 0.7–3.1)
LYMPHOCYTES NFR BLD MANUAL: 5 % (ref 5–12)
LYMPHOCYTES NFR BLD MANUAL: 53 % (ref 19.6–45.3)
MCH RBC QN AUTO: 28.8 PG (ref 26.6–33)
MCHC RBC AUTO-ENTMCNC: 32.6 G/DL (ref 31.5–35.7)
MCV RBC AUTO: 88.1 FL (ref 79–97)
MONOCYTES # BLD AUTO: 0.38 10*3/MM3 (ref 0.1–0.9)
NEUTROPHILS # BLD AUTO: 2.41 10*3/MM3 (ref 1.7–7)
NEUTROPHILS NFR BLD MANUAL: 32 % (ref 42.7–76)
NITRITE UR QL STRIP: NEGATIVE
PH UR STRIP.AUTO: 6 [PH] (ref 5–9)
PLATELET # BLD AUTO: 220 10*3/MM3 (ref 140–450)
PMV BLD AUTO: 10.4 FL (ref 6–12)
POTASSIUM BLD-SCNC: 4 MMOL/L (ref 3.5–5.2)
PROT SERPL-MCNC: 7 G/DL (ref 6–8.5)
PROT UR QL STRIP: NEGATIVE
RBC # BLD AUTO: 3.79 10*6/MM3 (ref 3.77–5.28)
RBC # UR: ABNORMAL /HPF
RBC MORPH BLD: NORMAL
REF LAB TEST METHOD: ABNORMAL
SMALL PLATELETS BLD QL SMEAR: ADEQUATE
SODIUM BLD-SCNC: 141 MMOL/L (ref 136–145)
SP GR UR STRIP: 1.03 (ref 1–1.03)
SQUAMOUS #/AREA URNS HPF: ABNORMAL /HPF
UROBILINOGEN UR QL STRIP: ABNORMAL
VARIANT LYMPHS NFR BLD MANUAL: 9 % (ref 0–5)
WBC MORPH BLD: NORMAL
WBC NRBC COR # BLD: 7.53 10*3/MM3 (ref 3.4–10.8)
WBC UR QL AUTO: ABNORMAL /HPF

## 2019-07-21 PROCEDURE — 80053 COMPREHEN METABOLIC PANEL: CPT | Performed by: FAMILY MEDICINE

## 2019-07-21 PROCEDURE — 81001 URINALYSIS AUTO W/SCOPE: CPT | Performed by: FAMILY MEDICINE

## 2019-07-21 PROCEDURE — 85025 COMPLETE CBC W/AUTO DIFF WBC: CPT | Performed by: FAMILY MEDICINE

## 2019-07-21 PROCEDURE — 85007 BL SMEAR W/DIFF WBC COUNT: CPT | Performed by: FAMILY MEDICINE

## 2019-07-21 PROCEDURE — 84703 CHORIONIC GONADOTROPIN ASSAY: CPT | Performed by: FAMILY MEDICINE

## 2019-07-21 RX ORDER — ACYCLOVIR 200 MG/1
CAPSULE ORAL
COMMUNITY
End: 2020-02-04

## 2019-07-21 RX ORDER — TRAZODONE HYDROCHLORIDE 50 MG/1
50 TABLET ORAL 2 TIMES DAILY PRN
COMMUNITY
End: 2019-09-24

## 2019-07-21 RX ORDER — PRENATAL VIT NO.126/IRON/FOLIC 28MG-0.8MG
TABLET ORAL DAILY
COMMUNITY
End: 2019-10-24 | Stop reason: SDUPTHER

## 2019-09-24 ENCOUNTER — HOSPITAL ENCOUNTER (EMERGENCY)
Facility: HOSPITAL | Age: 24
Discharge: HOME | End: 2019-09-24
Attending: EMERGENCY MEDICINE | Admitting: EMERGENCY MEDICINE

## 2019-09-24 VITALS
HEART RATE: 72 BPM | DIASTOLIC BLOOD PRESSURE: 79 MMHG | BODY MASS INDEX: 42.6 KG/M2 | HEIGHT: 65 IN | RESPIRATION RATE: 18 BRPM | OXYGEN SATURATION: 100 % | SYSTOLIC BLOOD PRESSURE: 122 MMHG | TEMPERATURE: 97.9 F | WEIGHT: 255.7 LBS

## 2019-09-24 DIAGNOSIS — Z3A.01 LESS THAN 8 WEEKS GESTATION OF PREGNANCY: Primary | ICD-10-CM

## 2019-09-24 LAB
BILIRUB UR QL STRIP: NEGATIVE
CLARITY UR: CLEAR
COLOR UR: YELLOW
GLUCOSE UR STRIP-MCNC: NEGATIVE MG/DL
HCG INTACT+B SERPL-ACNC: 573.2 MIU/ML
HGB UR QL STRIP.AUTO: NEGATIVE
KETONES UR QL STRIP: NEGATIVE
LEUKOCYTE ESTERASE UR QL STRIP.AUTO: NEGATIVE
NITRITE UR QL STRIP: NEGATIVE
PH UR STRIP.AUTO: 6.5 [PH] (ref 5–9)
PROT UR QL STRIP: NEGATIVE
SP GR UR STRIP: 1.01 (ref 1–1.03)
UROBILINOGEN UR QL STRIP: NORMAL

## 2019-09-24 PROCEDURE — 84702 CHORIONIC GONADOTROPIN TEST: CPT | Performed by: PHYSICIAN ASSISTANT

## 2019-09-24 PROCEDURE — 99283 EMERGENCY DEPT VISIT LOW MDM: CPT

## 2019-09-24 PROCEDURE — 81003 URINALYSIS AUTO W/O SCOPE: CPT | Performed by: PHYSICIAN ASSISTANT
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
## 2019-09-24 NOTE — ED NOTES
Patient stated that she is needing a US due to a possible pregnancy. Pt stated that she went to [facility] last week or the week before and was told that her pregnancy test was negative because it was barely showing up. Patient stated that she took a pregnancy test this morning and it came back positive. Pt reports that she has done this with her past pregnancies (with her second daughter). Patient reports that she just wants to know for sure if she is so she can take proper measures.      Robert Cox  09/24/19 7424

## 2019-10-07 ENCOUNTER — APPOINTMENT (OUTPATIENT)
Dept: ULTRASOUND IMAGING | Facility: HOSPITAL | Age: 24
End: 2019-10-07

## 2019-10-07 ENCOUNTER — HOSPITAL ENCOUNTER (EMERGENCY)
Facility: HOSPITAL | Age: 24
Discharge: HOME | End: 2019-10-07
Attending: FAMILY MEDICINE | Admitting: EMERGENCY MEDICINE

## 2019-10-07 VITALS
TEMPERATURE: 98.3 F | SYSTOLIC BLOOD PRESSURE: 102 MMHG | WEIGHT: 254 LBS | DIASTOLIC BLOOD PRESSURE: 62 MMHG | RESPIRATION RATE: 16 BRPM | HEART RATE: 62 BPM | OXYGEN SATURATION: 98 % | HEIGHT: 65 IN | BODY MASS INDEX: 42.32 KG/M2

## 2019-10-07 DIAGNOSIS — Z3A.01 LESS THAN 8 WEEKS GESTATION OF PREGNANCY: ICD-10-CM

## 2019-10-07 DIAGNOSIS — O23.41 UTI (URINARY TRACT INFECTION) IN PREGNANCY, ANTEPARTUM, FIRST TRIMESTER: ICD-10-CM

## 2019-10-07 DIAGNOSIS — R11.2 NAUSEA AND VOMITING, INTRACTABILITY OF VOMITING NOT SPECIFIED, UNSPECIFIED VOMITING TYPE: Primary | ICD-10-CM

## 2019-10-07 LAB
ABO GROUP BLD: NORMAL
ALBUMIN SERPL-MCNC: 3.7 G/DL (ref 3.5–5.2)
ALBUMIN/GLOB SERPL: 0.9 G/DL
ALP SERPL-CCNC: 47 U/L (ref 39–117)
ALT SERPL W P-5'-P-CCNC: 9 U/L (ref 1–33)
ANION GAP SERPL CALCULATED.3IONS-SCNC: 11 MMOL/L (ref 5–15)
AST SERPL-CCNC: 13 U/L (ref 1–32)
BACTERIA UR QL AUTO: ABNORMAL /HPF
BASOPHILS # BLD AUTO: 0.02 10*3/MM3 (ref 0–0.2)
BASOPHILS NFR BLD AUTO: 0.3 % (ref 0–1.5)
BILIRUB SERPL-MCNC: 0.6 MG/DL (ref 0.2–1.2)
BILIRUB UR QL STRIP: NEGATIVE
BUN BLD-MCNC: 9 MG/DL (ref 6–20)
BUN/CREAT SERPL: 11 (ref 7–25)
CALCIUM SPEC-SCNC: 8.7 MG/DL (ref 8.6–10.5)
CHLORIDE SERPL-SCNC: 104 MMOL/L (ref 98–107)
CLARITY UR: CLEAR
CO2 SERPL-SCNC: 22 MMOL/L (ref 22–29)
COLOR UR: YELLOW
CREAT BLD-MCNC: 0.82 MG/DL (ref 0.57–1)
D-LACTATE SERPL-SCNC: 1 MMOL/L (ref 0.5–2)
DEPRECATED RDW RBC AUTO: 41.8 FL (ref 37–54)
EOSINOPHIL # BLD AUTO: 0.07 10*3/MM3 (ref 0–0.4)
EOSINOPHIL NFR BLD AUTO: 1 % (ref 0.3–6.2)
ERYTHROCYTE [DISTWIDTH] IN BLOOD BY AUTOMATED COUNT: 13 % (ref 12.3–15.4)
GFR SERPL CREATININE-BSD FRML MDRD: 102 ML/MIN/1.73
GLOBULIN UR ELPH-MCNC: 3.9 GM/DL
GLUCOSE BLD-MCNC: 101 MG/DL (ref 65–99)
GLUCOSE UR STRIP-MCNC: NEGATIVE MG/DL
HCG INTACT+B SERPL-ACNC: NORMAL MIU/ML
HCG SERPL QL: POSITIVE
HCT VFR BLD AUTO: 38.5 % (ref 34–46.6)
HGB BLD-MCNC: 12.6 G/DL (ref 12–15.9)
HGB UR QL STRIP.AUTO: NEGATIVE
HOLD SPECIMEN: NORMAL
HOLD SPECIMEN: NORMAL
HYALINE CASTS UR QL AUTO: ABNORMAL /LPF
IMM GRANULOCYTES # BLD AUTO: 0.02 10*3/MM3 (ref 0–0.05)
IMM GRANULOCYTES NFR BLD AUTO: 0.3 % (ref 0–0.5)
KETONES UR QL STRIP: NEGATIVE
LEUKOCYTE ESTERASE UR QL STRIP.AUTO: ABNORMAL
LIPASE SERPL-CCNC: 24 U/L (ref 13–60)
LYMPHOCYTES # BLD AUTO: 2.91 10*3/MM3 (ref 0.7–3.1)
LYMPHOCYTES NFR BLD AUTO: 39.9 % (ref 19.6–45.3)
MCH RBC QN AUTO: 28.8 PG (ref 26.6–33)
MCHC RBC AUTO-ENTMCNC: 32.7 G/DL (ref 31.5–35.7)
MCV RBC AUTO: 88.1 FL (ref 79–97)
MONOCYTES # BLD AUTO: 0.5 10*3/MM3 (ref 0.1–0.9)
MONOCYTES NFR BLD AUTO: 6.8 % (ref 5–12)
NEUTROPHILS # BLD AUTO: 3.78 10*3/MM3 (ref 1.7–7)
NEUTROPHILS NFR BLD AUTO: 51.7 % (ref 42.7–76)
NITRITE UR QL STRIP: NEGATIVE
NRBC BLD AUTO-RTO: 0 /100 WBC (ref 0–0.2)
PH UR STRIP.AUTO: 6 [PH] (ref 5–9)
PLATELET # BLD AUTO: 232 10*3/MM3 (ref 140–450)
PMV BLD AUTO: 10.2 FL (ref 6–12)
POTASSIUM BLD-SCNC: 3.9 MMOL/L (ref 3.5–5.2)
PROT SERPL-MCNC: 7.6 G/DL (ref 6–8.5)
PROT UR QL STRIP: NEGATIVE
RBC # BLD AUTO: 4.37 10*6/MM3 (ref 3.77–5.28)
RBC # UR: ABNORMAL /HPF
REF LAB TEST METHOD: ABNORMAL
RH BLD: POSITIVE
SODIUM BLD-SCNC: 137 MMOL/L (ref 136–145)
SP GR UR STRIP: 1.03 (ref 1–1.03)
SQUAMOUS #/AREA URNS HPF: ABNORMAL /HPF
UROBILINOGEN UR QL STRIP: ABNORMAL
WBC NRBC COR # BLD: 7.3 10*3/MM3 (ref 3.4–10.8)
WBC UR QL AUTO: ABNORMAL /HPF
WHOLE BLOOD HOLD SPECIMEN: NORMAL
WHOLE BLOOD HOLD SPECIMEN: NORMAL

## 2019-10-07 PROCEDURE — 25010000002 CEFTRIAXONE: Performed by: EMERGENCY MEDICINE

## 2019-10-07 PROCEDURE — 86900 BLOOD TYPING SEROLOGIC ABO: CPT | Performed by: EMERGENCY MEDICINE

## 2019-10-07 PROCEDURE — 96375 TX/PRO/DX INJ NEW DRUG ADDON: CPT

## 2019-10-07 PROCEDURE — 84702 CHORIONIC GONADOTROPIN TEST: CPT | Performed by: EMERGENCY MEDICINE

## 2019-10-07 PROCEDURE — 81001 URINALYSIS AUTO W/SCOPE: CPT | Performed by: FAMILY MEDICINE

## 2019-10-07 PROCEDURE — 36415 COLL VENOUS BLD VENIPUNCTURE: CPT | Performed by: EMERGENCY MEDICINE

## 2019-10-07 PROCEDURE — 83605 ASSAY OF LACTIC ACID: CPT | Performed by: EMERGENCY MEDICINE

## 2019-10-07 PROCEDURE — 83690 ASSAY OF LIPASE: CPT

## 2019-10-07 PROCEDURE — 85025 COMPLETE CBC W/AUTO DIFF WBC: CPT

## 2019-10-07 PROCEDURE — 87086 URINE CULTURE/COLONY COUNT: CPT | Performed by: EMERGENCY MEDICINE

## 2019-10-07 PROCEDURE — 76817 TRANSVAGINAL US OBSTETRIC: CPT

## 2019-10-07 PROCEDURE — 99283 EMERGENCY DEPT VISIT LOW MDM: CPT

## 2019-10-07 PROCEDURE — 80053 COMPREHEN METABOLIC PANEL: CPT

## 2019-10-07 PROCEDURE — 86901 BLOOD TYPING SEROLOGIC RH(D): CPT | Performed by: EMERGENCY MEDICINE

## 2019-10-07 PROCEDURE — 84703 CHORIONIC GONADOTROPIN ASSAY: CPT | Performed by: EMERGENCY MEDICINE

## 2019-10-07 PROCEDURE — 87040 BLOOD CULTURE FOR BACTERIA: CPT | Performed by: EMERGENCY MEDICINE

## 2019-10-07 PROCEDURE — 96365 THER/PROPH/DIAG IV INF INIT: CPT

## 2019-10-07 PROCEDURE — 96361 HYDRATE IV INFUSION ADD-ON: CPT

## 2019-10-07 PROCEDURE — 25010000002 ONDANSETRON PER 1 MG: Performed by: FAMILY MEDICINE

## 2019-10-07 RX ORDER — ONDANSETRON 2 MG/ML
4 INJECTION INTRAMUSCULAR; INTRAVENOUS ONCE
Status: COMPLETED | OUTPATIENT
Start: 2019-10-07 | End: 2019-10-07

## 2019-10-07 RX ORDER — PNV NO.95/FERROUS FUM/FOLIC AC 28MG-0.8MG
1 TABLET ORAL DAILY
Qty: 30 TABLET | Refills: 0 | Status: SHIPPED | OUTPATIENT
Start: 2019-10-07 | End: 2019-10-24 | Stop reason: SDUPTHER

## 2019-10-07 RX ORDER — SODIUM CHLORIDE 0.9 % (FLUSH) 0.9 %
10 SYRINGE (ML) INJECTION AS NEEDED
Status: DISCONTINUED | OUTPATIENT
Start: 2019-10-07 | End: 2019-10-07 | Stop reason: HOSPADM

## 2019-10-07 RX ORDER — ONDANSETRON 4 MG/1
4 TABLET, ORALLY DISINTEGRATING ORAL 4 TIMES DAILY PRN
Qty: 12 TABLET | Refills: 0 | Status: SHIPPED | OUTPATIENT
Start: 2019-10-07 | End: 2019-10-24 | Stop reason: SDUPTHER

## 2019-10-07 RX ORDER — NITROFURANTOIN 25; 75 MG/1; MG/1
100 CAPSULE ORAL 2 TIMES DAILY
Qty: 14 CAPSULE | Refills: 0 | Status: SHIPPED | OUTPATIENT
Start: 2019-10-07 | End: 2019-10-14

## 2019-10-07 RX ORDER — SODIUM CHLORIDE 9 MG/ML
INJECTION, SOLUTION INTRAVENOUS
Status: DISCONTINUED
Start: 2019-10-07 | End: 2019-10-07 | Stop reason: HOSPADM

## 2019-10-07 RX ADMIN — ONDANSETRON 4 MG: 2 INJECTION INTRAMUSCULAR; INTRAVENOUS at 05:15

## 2019-10-07 RX ADMIN — SODIUM CHLORIDE 1000 ML: 900 INJECTION, SOLUTION INTRAVENOUS at 07:21

## 2019-10-07 RX ADMIN — CEFTRIAXONE 1 G: 1 INJECTION, POWDER, FOR SOLUTION INTRAMUSCULAR; INTRAVENOUS at 09:24

## 2019-10-07 RX ADMIN — SODIUM CHLORIDE 1000 ML: 9 INJECTION, SOLUTION INTRAVENOUS at 05:15

## 2019-10-07 NOTE — ED NOTES
Patient requesting coke or a water, provider notified and stated nothing to eat or drink until patient is re-evaluated by provider. Patient notified.      Charmaine Martínez RN  10/07/19 0873

## 2019-10-07 NOTE — ED NOTES
This tech was unable to obtain blood work from Pt. RN notified and lab was called at 0814.      Robert Cox  10/07/19 0814

## 2019-10-07 NOTE — DISCHARGE INSTRUCTIONS
Increase PO fluid intake  Return ED fever, vomiting, dehydration, vaginal bleeding, contractions, loss of fluid, worse condition, other concerns

## 2019-10-07 NOTE — ED NOTES
Lab to draw second set of blood cultures and then patient can be discharged. Lab notified of patient being up for discharge and stated they would be here shortly to draw cultures.      Charmaine Martínez RN  10/07/19 1002

## 2019-10-07 NOTE — ED NOTES
Patient presents to ED with complaint of upper abdominal pain. She states she is currently 8 weeks pregnant. She states this started yesterday.      Kylah Robles RN  10/07/19 0454
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
## 2019-10-07 NOTE — ED NOTES
Lab in room, tech obtaining blood pressure manually at this time.      Charmaine Martínez RN  10/07/19 8936

## 2019-10-07 NOTE — ED NOTES
MD notified of patient's blood pressure. Dr. Salcedo gave this RN verbal order to administer bolus of 1000 mL of Normal Saline.      Charmaine Martínez, RN  10/07/19 0732

## 2019-10-08 LAB — BACTERIA SPEC AEROBE CULT: NO GROWTH

## 2019-10-12 LAB — BACTERIA SPEC AEROBE CULT: NORMAL

## 2019-10-23 ENCOUNTER — TELEPHONE (OUTPATIENT)
Dept: OBSTETRICS AND GYNECOLOGY | Facility: CLINIC | Age: 24
End: 2019-10-23

## 2019-10-23 NOTE — TELEPHONE ENCOUNTER
I called to remind patient of her appointment tomorrow. I left message for her to return my call.

## 2019-10-24 ENCOUNTER — INITIAL PRENATAL (OUTPATIENT)
Dept: OBSTETRICS AND GYNECOLOGY | Facility: CLINIC | Age: 24
End: 2019-10-24

## 2019-10-24 ENCOUNTER — APPOINTMENT (OUTPATIENT)
Dept: LAB | Facility: HOSPITAL | Age: 24
End: 2019-10-24

## 2019-10-24 VITALS — SYSTOLIC BLOOD PRESSURE: 102 MMHG | BODY MASS INDEX: 43.1 KG/M2 | WEIGHT: 259 LBS | DIASTOLIC BLOOD PRESSURE: 68 MMHG

## 2019-10-24 DIAGNOSIS — Z87.59 HISTORY OF STILLBIRTH: ICD-10-CM

## 2019-10-24 DIAGNOSIS — Z34.81 MULTIGRAVIDA IN FIRST TRIMESTER: ICD-10-CM

## 2019-10-24 DIAGNOSIS — O99.211 OBESITY AFFECTING PREGNANCY IN FIRST TRIMESTER: ICD-10-CM

## 2019-10-24 DIAGNOSIS — A60.09 GENITAL HERPES AFFECTING PREGNANCY IN FIRST TRIMESTER: ICD-10-CM

## 2019-10-24 DIAGNOSIS — Z34.80 SUPERVISION OF OTHER NORMAL PREGNANCY: Primary | ICD-10-CM

## 2019-10-24 DIAGNOSIS — O21.9 NAUSEA AND VOMITING DURING PREGNANCY: ICD-10-CM

## 2019-10-24 DIAGNOSIS — O09.91 HIGH-RISK PREGNANCY, FIRST TRIMESTER: ICD-10-CM

## 2019-10-24 DIAGNOSIS — R00.2 PALPITATIONS: ICD-10-CM

## 2019-10-24 DIAGNOSIS — O36.80X0 PREGNANCY WITH UNCERTAIN FETAL VIABILITY, SINGLE OR UNSPECIFIED FETUS: Primary | ICD-10-CM

## 2019-10-24 DIAGNOSIS — O98.311 GENITAL HERPES AFFECTING PREGNANCY IN FIRST TRIMESTER: ICD-10-CM

## 2019-10-24 PROBLEM — F19.91 HISTORY OF DRUG USE: Status: ACTIVE | Noted: 2019-10-24

## 2019-10-24 PROBLEM — F19.91 HISTORY OF DRUG USE: Status: RESOLVED | Noted: 2019-10-24 | Resolved: 2019-10-24

## 2019-10-24 PROBLEM — Z34.90 PREGNANCY: Status: RESOLVED | Noted: 2017-11-17 | Resolved: 2019-10-24

## 2019-10-24 LAB
ABO GROUP BLD: NORMAL
AMPHET+METHAMPHET UR QL: NEGATIVE
AMPHETAMINES UR QL: NEGATIVE
BARBITURATES UR QL SCN: NEGATIVE
BASOPHILS # BLD AUTO: 0.02 10*3/MM3 (ref 0–0.2)
BASOPHILS NFR BLD AUTO: 0.2 % (ref 0–1.5)
BENZODIAZ UR QL SCN: NEGATIVE
BILIRUB UR QL STRIP: NEGATIVE
BLD GP AB SCN SERPL QL: NEGATIVE
BUPRENORPHINE SERPL-MCNC: NEGATIVE NG/ML
CANNABINOIDS SERPL QL: NEGATIVE
CLARITY UR: CLEAR
COCAINE UR QL: NEGATIVE
COLOR UR: YELLOW
DEPRECATED RDW RBC AUTO: 42.5 FL (ref 37–54)
EOSINOPHIL # BLD AUTO: 0.29 10*3/MM3 (ref 0–0.4)
EOSINOPHIL NFR BLD AUTO: 3.5 % (ref 0.3–6.2)
ERYTHROCYTE [DISTWIDTH] IN BLOOD BY AUTOMATED COUNT: 13.5 % (ref 12.3–15.4)
GLUCOSE UR STRIP-MCNC: NEGATIVE MG/DL
HBV SURFACE AG SERPL QL IA: NORMAL
HCT VFR BLD AUTO: 35.8 % (ref 34–46.6)
HCV AB SER DONR QL: NORMAL
HGB BLD-MCNC: 12 G/DL (ref 12–15.9)
HGB UR QL STRIP.AUTO: NEGATIVE
HIV1+2 AB SER QL: NORMAL
IMM GRANULOCYTES # BLD AUTO: 0.03 10*3/MM3 (ref 0–0.05)
IMM GRANULOCYTES NFR BLD AUTO: 0.4 % (ref 0–0.5)
KETONES UR QL STRIP: NEGATIVE
LEUKOCYTE ESTERASE UR QL STRIP.AUTO: NEGATIVE
LYMPHOCYTES # BLD AUTO: 2.88 10*3/MM3 (ref 0.7–3.1)
LYMPHOCYTES NFR BLD AUTO: 34.5 % (ref 19.6–45.3)
Lab: NORMAL
MCH RBC QN AUTO: 29.1 PG (ref 26.6–33)
MCHC RBC AUTO-ENTMCNC: 33.5 G/DL (ref 31.5–35.7)
MCV RBC AUTO: 86.9 FL (ref 79–97)
METHADONE UR QL SCN: NEGATIVE
MONOCYTES # BLD AUTO: 0.6 10*3/MM3 (ref 0.1–0.9)
MONOCYTES NFR BLD AUTO: 7.2 % (ref 5–12)
NEUTROPHILS # BLD AUTO: 4.52 10*3/MM3 (ref 1.7–7)
NEUTROPHILS NFR BLD AUTO: 54.2 % (ref 42.7–76)
NITRITE UR QL STRIP: NEGATIVE
NRBC BLD AUTO-RTO: 0 /100 WBC (ref 0–0.2)
OPIATES UR QL: NEGATIVE
OXYCODONE UR QL SCN: NEGATIVE
PCP UR QL SCN: NEGATIVE
PH UR STRIP.AUTO: 7 [PH] (ref 5–8)
PLATELET # BLD AUTO: 229 10*3/MM3 (ref 140–450)
PMV BLD AUTO: 11.2 FL (ref 6–12)
PROPOXYPH UR QL: NEGATIVE
PROT UR QL STRIP: NEGATIVE
RBC # BLD AUTO: 4.12 10*6/MM3 (ref 3.77–5.28)
RH BLD: POSITIVE
RUBV IGG SERPL IA-ACNC: NORMAL
SP GR UR STRIP: 1.02 (ref 1–1.03)
TRICYCLICS UR QL SCN: NEGATIVE
UROBILINOGEN UR QL STRIP: NORMAL
WBC NRBC COR # BLD: 8.34 10*3/MM3 (ref 3.4–10.8)

## 2019-10-24 PROCEDURE — 81003 URINALYSIS AUTO W/O SCOPE: CPT | Performed by: NURSE PRACTITIONER

## 2019-10-24 PROCEDURE — 87491 CHLMYD TRACH DNA AMP PROBE: CPT | Performed by: NURSE PRACTITIONER

## 2019-10-24 PROCEDURE — 80307 DRUG TEST PRSMV CHEM ANLYZR: CPT | Performed by: NURSE PRACTITIONER

## 2019-10-24 PROCEDURE — 99214 OFFICE O/P EST MOD 30 MIN: CPT | Performed by: NURSE PRACTITIONER

## 2019-10-24 PROCEDURE — 87086 URINE CULTURE/COLONY COUNT: CPT | Performed by: NURSE PRACTITIONER

## 2019-10-24 PROCEDURE — 86803 HEPATITIS C AB TEST: CPT | Performed by: NURSE PRACTITIONER

## 2019-10-24 PROCEDURE — 85660 RBC SICKLE CELL TEST: CPT | Performed by: NURSE PRACTITIONER

## 2019-10-24 PROCEDURE — 80081 OBSTETRIC PANEL INC HIV TSTG: CPT | Performed by: NURSE PRACTITIONER

## 2019-10-24 PROCEDURE — 83021 HEMOGLOBIN CHROMOTOGRAPHY: CPT | Performed by: NURSE PRACTITIONER

## 2019-10-24 PROCEDURE — 36415 COLL VENOUS BLD VENIPUNCTURE: CPT

## 2019-10-24 PROCEDURE — 87591 N.GONORRHOEAE DNA AMP PROB: CPT | Performed by: NURSE PRACTITIONER

## 2019-10-24 PROCEDURE — 87661 TRICHOMONAS VAGINALIS AMPLIF: CPT | Performed by: NURSE PRACTITIONER

## 2019-10-24 RX ORDER — PNV NO.95/FERROUS FUM/FOLIC AC 28MG-0.8MG
1 TABLET ORAL DAILY
Qty: 30 TABLET | Refills: 12 | Status: SHIPPED | OUTPATIENT
Start: 2019-10-24 | End: 2019-10-24

## 2019-10-24 RX ORDER — PNV NO.95/FERROUS FUM/FOLIC AC 28MG-0.8MG
1 TABLET ORAL DAILY
Qty: 30 TABLET | Refills: 12 | Status: SHIPPED | OUTPATIENT
Start: 2019-10-24 | End: 2019-12-10 | Stop reason: SDUPTHER

## 2019-10-24 RX ORDER — ONDANSETRON 4 MG/1
4 TABLET, ORALLY DISINTEGRATING ORAL 4 TIMES DAILY PRN
Qty: 30 TABLET | Refills: 2 | Status: SHIPPED | OUTPATIENT
Start: 2019-10-24 | End: 2019-10-24

## 2019-10-24 RX ORDER — ONDANSETRON 4 MG/1
4 TABLET, ORALLY DISINTEGRATING ORAL 4 TIMES DAILY PRN
Qty: 30 TABLET | Refills: 2 | Status: SHIPPED | OUTPATIENT
Start: 2019-10-24 | End: 2019-12-03

## 2019-10-24 NOTE — PROGRESS NOTES
I spent approximately 60 minutes with the patient acquiring the health and history intake and discussing topics related to healthy lifestyle. This is her fifth pregnancy. She has 3 daughters. Her son was a stillbirth at 27wks.  A newob bag is given. The 1st trimester teaching was done with the patient. We discussed a healthy diet and exercise and what is recommended. I also discussed Listeriosis and Toxoplasmosis and what fish to avoid due to high mercury levels. Informed patient not to be in hot tubs, saunas, or tanning beds. We discussed that spotting may occur after intercourse which is common, but if heavy bleeding like a period occurs to call the [facility] or hospital if clinic is closed.  I encouraged her to make an appointment with the dentist if she has not had a dental exam and cleaning in the last 6 months. The patient denies the use of alcohol, illicit drug use, and tobacco smoking. However, we discussed the hospital policy procedure if a patient has a positive urine drug screen at the time of admission to the hospital. She plans to breastfeed. She is still breastfeeding her youngest child.  I gave her pamphlet on breastfeeding classes and the breastfeeding mothers support group that is provided by Kylah Su, Lactation Consultant. We discussed the resources that is offered at the health departments, and we discussed that some health departments have a breastfeeding peer counselor. I discussed with the patient that a pediatrician needs to be chosen prior to delivery for the infant to have an appointment scheduled before leaving the hospital. She tells me she may be moving back to [place] in March.   I discussed lab tests will be done today. She signed a release to get the patient's last pap smear. I encouraged the patient to get the TDAP vaccine in the 3rd trimester. I told the patient that health departments are giving the TDAP vaccine to family members. All questions were answered at this  time.
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
## 2019-10-25 PROBLEM — O09.91 HIGH-RISK PREGNANCY, FIRST TRIMESTER: Status: ACTIVE | Noted: 2019-10-25

## 2019-10-25 PROBLEM — O98.311 GENITAL HERPES AFFECTING PREGNANCY IN FIRST TRIMESTER: Status: ACTIVE | Noted: 2017-06-23

## 2019-10-25 PROBLEM — A60.09 GENITAL HERPES AFFECTING PREGNANCY IN FIRST TRIMESTER: Status: ACTIVE | Noted: 2017-06-23

## 2019-10-25 LAB
BACTERIA SPEC AEROBE CULT: NORMAL
C TRACH RRNA CVX QL NAA+PROBE: NEGATIVE
HGB A MFR BLD: 97.9 % (ref 96.4–98.8)
HGB A2 MFR BLD COLUMN CHROM: 2.1 % (ref 1.8–3.2)
HGB C MFR BLD: 0 %
HGB F MFR BLD: 0 % (ref 0–2)
HGB FRACT BLD-IMP: NORMAL
HGB S BLD QL SOLY: NEGATIVE
HGB S MFR BLD: 0 %
N GONORRHOEA RRNA SPEC QL NAA+PROBE: NEGATIVE
RPR SER QL: NORMAL
TRICHOMONAS VAGINALIS PCR: NEGATIVE

## 2019-10-31 ENCOUNTER — ROUTINE PRENATAL (OUTPATIENT)
Dept: OBSTETRICS AND GYNECOLOGY | Facility: CLINIC | Age: 24
End: 2019-10-31

## 2019-10-31 VITALS — WEIGHT: 259 LBS | SYSTOLIC BLOOD PRESSURE: 116 MMHG | DIASTOLIC BLOOD PRESSURE: 78 MMHG | BODY MASS INDEX: 43.1 KG/M2

## 2019-10-31 DIAGNOSIS — Z36.2 ENCOUNTER FOR OTHER ANTENATAL SCREENING FOLLOW-UP: ICD-10-CM

## 2019-10-31 DIAGNOSIS — O09.91 HIGH-RISK PREGNANCY, FIRST TRIMESTER: Primary | ICD-10-CM

## 2019-10-31 DIAGNOSIS — Z34.81 MULTIGRAVIDA IN FIRST TRIMESTER: ICD-10-CM

## 2019-10-31 DIAGNOSIS — O21.9 NAUSEA AND VOMITING IN PREGNANCY PRIOR TO 22 WEEKS GESTATION: ICD-10-CM

## 2019-10-31 PROCEDURE — 99213 OFFICE O/P EST LOW 20 MIN: CPT | Performed by: NURSE PRACTITIONER

## 2019-10-31 RX ORDER — DIPHENHYDRAMINE HYDROCHLORIDE 25 MG/1
25 CAPSULE ORAL 3 TIMES DAILY PRN
Qty: 30 TABLET | Refills: 4 | Status: SHIPPED | OUTPATIENT
Start: 2019-10-31 | End: 2020-03-18

## 2019-12-03 ENCOUNTER — LAB (OUTPATIENT)
Dept: LAB | Facility: HOSPITAL | Age: 24
End: 2019-12-03

## 2019-12-03 ENCOUNTER — ROUTINE PRENATAL (OUTPATIENT)
Dept: OBSTETRICS AND GYNECOLOGY | Facility: CLINIC | Age: 24
End: 2019-12-03

## 2019-12-03 VITALS — WEIGHT: 261 LBS | DIASTOLIC BLOOD PRESSURE: 67 MMHG | SYSTOLIC BLOOD PRESSURE: 108 MMHG | BODY MASS INDEX: 43.43 KG/M2

## 2019-12-03 DIAGNOSIS — O09.299 PREVIOUS STILLBIRTH OR DEMISE, ANTEPARTUM: ICD-10-CM

## 2019-12-03 DIAGNOSIS — O21.9 NAUSEA AND VOMITING IN PREGNANCY PRIOR TO 22 WEEKS GESTATION: ICD-10-CM

## 2019-12-03 DIAGNOSIS — O98.311 GENITAL HERPES AFFECTING PREGNANCY IN FIRST TRIMESTER: ICD-10-CM

## 2019-12-03 DIAGNOSIS — O99.891 BACK PAIN AFFECTING PREGNANCY IN SECOND TRIMESTER: ICD-10-CM

## 2019-12-03 DIAGNOSIS — M54.9 BACK PAIN AFFECTING PREGNANCY IN SECOND TRIMESTER: ICD-10-CM

## 2019-12-03 DIAGNOSIS — O09.91 HIGH-RISK PREGNANCY, FIRST TRIMESTER: ICD-10-CM

## 2019-12-03 DIAGNOSIS — Z3A.14 14 WEEKS GESTATION OF PREGNANCY: Primary | ICD-10-CM

## 2019-12-03 DIAGNOSIS — R00.2 PALPITATIONS: ICD-10-CM

## 2019-12-03 DIAGNOSIS — Z34.81 MULTIGRAVIDA IN FIRST TRIMESTER: ICD-10-CM

## 2019-12-03 DIAGNOSIS — A60.09 GENITAL HERPES AFFECTING PREGNANCY IN FIRST TRIMESTER: ICD-10-CM

## 2019-12-03 LAB
AMPHET+METHAMPHET UR QL: NEGATIVE
AMPHETAMINES UR QL: NEGATIVE
BARBITURATES UR QL SCN: NEGATIVE
BENZODIAZ UR QL SCN: NEGATIVE
BUPRENORPHINE SERPL-MCNC: NEGATIVE NG/ML
CANNABINOIDS SERPL QL: NEGATIVE
COCAINE UR QL: NEGATIVE
METHADONE UR QL SCN: NEGATIVE
OPIATES UR QL: NEGATIVE
OXYCODONE UR QL SCN: NEGATIVE
PCP UR QL SCN: NEGATIVE
PROPOXYPH UR QL: NEGATIVE
TRICYCLICS UR QL SCN: NEGATIVE

## 2019-12-03 PROCEDURE — 80307 DRUG TEST PRSMV CHEM ANLYZR: CPT

## 2019-12-03 PROCEDURE — 99213 OFFICE O/P EST LOW 20 MIN: CPT | Performed by: OBSTETRICS & GYNECOLOGY

## 2019-12-03 NOTE — PROGRESS NOTES
CC: Prenatal visit    Krishna Clarke is a 24 y.o.  at 14w3d.  Doing well.  It is having some back pain.  I recommended that she be seen by physical therapy, she felt comfortable with this plan.  Patient would like to have NIPT testing done as she does have a history of stillbirth at 27 weeks with her first pregnancy.  Discussed pregnancy history, and patient does not have any history of  delivery, no bleeding or cramping at this time.  /67   Wt 118 kg (261 lb)   LMP 08/10/2019 (Approximate)   BMI 43.43 kg/m²        Fetal Heart Rate: 144    10/19 Problems (from 10/24/19 to present)     Problem Noted Resolved    Nausea and vomiting in pregnancy prior to 22 weeks gestation 10/31/2019 by Sophie Landers APRN No    Overview Signed 10/31/2019  4:02 PM by Sophie Landers APRN Zofran not helping; ordered Vitamin B6 10/31         High-risk pregnancy, first trimester 10/25/2019 by Sophie Landers APRN No    Overview Addendum 11/3/2019  9:17 PM by Sophie Landers APRN     A positive/neg antibody/Rubella Equivocal/ GBS unk  DatinTUS in ER @ 6w2d SULY 2020  Genetics:   Tdap: @ 28 wks  Flu: Declines  Anatomy: @ 20 wks  1h Glucola: @ 28 wks  H&H/Plts: @28 wks  H&H/Plts: 12/35.8/229 10/24/2019  Neg Sickle Cell trait/disease  Needs PAP 6 wk PP (ASCUS-neg. HPV 2017)           Palpitations 10/24/2019 by Sophie Landers APRN No    Overview Addendum 10/31/2019  4:03 PM by Sophie Landers APRN     Referred to Cardiology placed 10/24/19         Multigravida in first trimester 10/24/2019 by Sophie Landers APRN No    Obesity complicating pregnancy 2017 by Padmini Sims MD No    Genital herpes affecting pregnancy in first trimester 2017 by Padmini Sims MD No    Overview Signed 10/24/2019  1:07 PM by Sophie Landers ELIZABETH     Suppression at 35 weeks.          Previous stillbirth or demise, antepartum 2017 by Padmini Sims MD No    Overview  Signed 10/25/2019  9:14 PM by Sophie Landers APRN     27 wks stillbirth in 2017; per patient, r/t to drug use         History of drug use 10/24/2019 by Sophie Landers APRN 10/24/2019 by Sophie Landers APRN    Overview Addendum 10/24/2019 12:53 PM by Sophie Landers APRN      Hx of drug use with pregnancy in 2017 that resulted in a stillbirth.                A/P: Krishna Clarke is a 24 y.o.  at 14w3d.  Doing well with appropriately progressing pregnancy.  PT consult placed for history of back injury with low back pain.  NIPT testing ordered today.  Patient to return to see me in 2 weeks, sooner as needed.  Bleeding and cramping precautions given.  - RTC in 2 weeks     Diagnosis Plan   1. 14 weeks gestation of pregnancy     2. Nausea and vomiting in pregnancy prior to 22 weeks gestation     3. High-risk pregnancy, first trimester  Urine Drug Screen - Urine, Clean Catch   4. Palpitations     5. Multigravida in first trimester     6. Genital herpes affecting pregnancy in first trimester     7. Previous stillbirth or demise, antepartum       Gwyn Eaton DO  12/3/2019  8:49 AM

## 2019-12-09 DIAGNOSIS — R00.2 PALPITATIONS: Primary | ICD-10-CM

## 2019-12-10 ENCOUNTER — OFFICE VISIT (OUTPATIENT)
Dept: CARDIOLOGY | Facility: CLINIC | Age: 24
End: 2019-12-10

## 2019-12-10 VITALS
HEIGHT: 65 IN | SYSTOLIC BLOOD PRESSURE: 118 MMHG | OXYGEN SATURATION: 100 % | BODY MASS INDEX: 43.58 KG/M2 | DIASTOLIC BLOOD PRESSURE: 72 MMHG | HEART RATE: 61 BPM | WEIGHT: 261.6 LBS

## 2019-12-10 DIAGNOSIS — R00.2 PALPITATIONS: Primary | ICD-10-CM

## 2019-12-10 PROCEDURE — 93000 ELECTROCARDIOGRAM COMPLETE: CPT | Performed by: INTERNAL MEDICINE

## 2019-12-10 PROCEDURE — 99204 OFFICE O/P NEW MOD 45 MIN: CPT | Performed by: INTERNAL MEDICINE

## 2019-12-10 NOTE — PROGRESS NOTES
[facility] Cardiology  OFFICE NOTE    Cardiovascular Medicine  Davin Castillo M.D., RPVI         No referring provider defined for this encounter.    Thank you for asking me to see Krishna Clarke for chest pain.    History of Present Illness  This is a 24 y.o. female with:    1.  Ongoing pregnancy  2.  Chest pain    Krishna Clarke is a 24 y.o. female who presents for consultation today.  Patient is currently 23 weeks pregnant.  Is here for further evaluation for chest pain.  Reported she will have 1-2 episodes of chest pain in a month, she described the pain is stabbing in character, localized to the left side of the chest, lasts for about 1 to 2 minutes, happens when she is resting or under a lot of stress, not associated with exertion, gets worse with deep breaths and palpation, no change with posture position.  Not associated with diaphoresis nausea or vomiting.  Not associated with heavy meals.  She had these symptoms for several years without any change in frequency or severity recently.  She also has history of palpitations, according to patient she had extensive work-up previously but without any diagnosis.  She currently does not have any palpitations since last 1 year.    No history of hypertension, hyperlipidemia, diabetes.  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  Not smoking currently.  No other drugs.      Review of Systems - ROS  Constitution: Negative for weakness, weight gain and weight loss.   HENT: Negative for congestion.    Eyes: Negative for blurred vision.   Cardiovascular: As mentioned above  Respiratory: Negative for cough and hemoptysis.    Endocrine: Negative for polydipsia and polyuria.   Hematologic/Lymphatic: Negative for bleeding problem. Does not bruise/bleed easily.   Skin: Negative for flushing.   Musculoskeletal: Negative for neck pain and stiffness.    Gastrointestinal: Negative for abdominal pain, diarrhea, jaundice, melena, nausea and vomiting.   Genitourinary: Negative for dysuria and hematuria.   Neurological: Negative for dizziness, focal weakness and numbness.   Psychiatric/Behavioral: Negative for altered mental status and depression.          All other systems were reviewed and were negative.    family history includes Alzheimer's disease in her maternal grandmother; Autism in her brother; Bipolar disorder in her sister; Diabetes in her maternal grandfather and maternal grandmother; Hiatal hernia in her maternal grandmother and mother; Hypertension in her mother; Learning disabilities in her daughter; No Known Problems in her brother, brother, daughter, daughter, sister, and sister; Schizophrenia in her mother.     reports that she has never smoked. She has never used smokeless tobacco. She reports that she does not drink alcohol or use drugs.    Allergies   Allergen Reactions   • Apricot Flavor Hives, Shortness Of Breath, Itching, Anxiety and GI Intolerance   • Phenazopyridine Hives, GI Intolerance and Nausea And Vomiting   • Tape [Adhesive Tape] Hives and Itching     CAUSES RASH   • Tegaderm Ag Mesh [Silver] Hives and Itching         Current Outpatient Medications:   •  acyclovir (ZOVIRAX) 200 MG capsule, Take  by mouth Every 4 (Four) Hours While Awake., Disp: , Rfl:   •  doxylamine (UNISOM) 25 MG tablet, Take 1 tablet by mouth At Night As Needed for Nausea., Disp: 30 tablet, Rfl: 4  •  Prenatal Vit-Fe Fumarate-FA (PRENATAL VITAMINS) 28-0.8 MG tablet, Take 1 tablet by mouth Daily., Disp: 30 tablet, Rfl: 12  •  vitamin B-6 (PYRIDOXINE) 25 MG tablet, Take 1 tablet by mouth 3 (Three) Times a Day As Needed (nausea and vomiting)., Disp: 30 tablet, Rfl: 4    Physical Exam:  Vitals:    12/10/19 0814   BP: 118/72   BP Location: Left arm   Patient Position: Sitting   Cuff Size: Adult   Pulse: 61   SpO2: 100%   Weight: 119 kg (261 lb 9.6 oz)   Height: 165.1 cm  "(65\")   PainSc: 0-No pain     Current Pain Level: none  Pulse Ox: Normal  on room air  General: alert, appears stated age and cooperative     Body Habitus: well-nourished    HEENT: Head: Normocephalic, no lesions, without obvious abnormality. No arcus senilis, xanthelasma or xanthomas.    Neuro: alert, oriented x3  Pulses: 2+ and symmetric  JVP: Volume/Pulsation: Normal.  Normal waveforms.   Appropriate inspiratory decrease.  No Kussmaul's. No Carlo's.   Carotid Exam: no bruit normal pulsation bilaterally   Carotid Volume: normal.     Respirations: no increased work of breathing   Chest:  Normal    Pulmonary:Normal   Precordium: Normal impulses. P2 is not palpable.  RV Heave: absent  LV Heave: absent  [place]:  normal size and placement  Palpable S4: absent.  Heart rate: normal    Heart Rhythm: regular     Heart Sounds: S1: normal  S2: normal  S3: absent   S4: absent  Opening Snap: absent    Pericardial Rub:  Absent: .    Abdomen:   Appearance: normal .  Palpation: Soft, non-tender to palpation, bowel sounds positive in all four quadrants; no guarding or rebound tenderness  Extremity: no edema.   LE Skin: no rashes  LE Hair:  normal  LE Pulses: well perfused with normal pulses in the distal extremities  Pallor on elevation: Absent. Rubor on dependency: None      DATA REVIEWED:     EKG. I personally reviewed and interpreted the EKG.  normal EKG, normal sinus rhythm    ECG/EMG Results (all)     None          --------------------------------------------------------------------------------------------------  LABS:     The CVD Risk score (Daveyino, et al., 2008) failed to calculate for the following reasons:    The 2008 CVD risk score is only valid for ages 30 to 74         Lab Results   Component Value Date    GLUCOSE 101 (H) 10/07/2019    BUN 9 10/07/2019    CREATININE 0.82 10/07/2019    EGFRIFAFRI 102 10/07/2019    BCR 11.0 10/07/2019    K 3.9 10/07/2019    CO2 22.0 10/07/2019    CALCIUM 8.7 10/07/2019    ALBUMIN " 3.70 10/07/2019    LABIL2 1.0 (L) 07/29/2018    AST 13 10/07/2019    ALT 9 10/07/2019     Lab Results   Component Value Date    WBC 8.34 10/24/2019    HGB 12.0 10/24/2019    HCT 35.8 10/24/2019    MCV 86.9 10/24/2019     10/24/2019     No results found for: CHOL, CHLPL, TRIG, HDL, LDL, LDLDIRECT  Lab Results   Component Value Date    TSH 1.19 03/05/2019     No results found for: CKTOTAL, CKMB, CKMBINDEX, TROPONINI, TROPONINT  No results found for: HGBA1C  No results found for: DDIMER  Lab Results   Component Value Date    ALT 9 10/07/2019     No results found for: HGBA1C  Lab Results   Component Value Date    CREATININE 0.82 10/07/2019     No results found for: IRON, TIBC, FERRITIN  No results found for: INR, PROTIME    Assessment/Plan     1. Palpitations  Currently asymptomatic since last 1 year.  Getting echocardiogram to assess for any structural normalities.  No need for heart monitor at this point.  EKG in office normal sinus rhythm normal QT interval.    2.  Chest pain:  Chest pain is atypical in character.  She has minimal risk factors.  We will monitor for now without further evaluation.  I advised her to seek medical attention for fever if she were to have significant chest pain not resolving.  Patient keeps on having recurrent chest pain, will consider treadmill stress test after her pregnancy.    3.  Ongoing pregnancy:  Following OB/GYN.      Prevention:  Patient's Body mass index is 43.53 kg/m². BMI is above normal parameters. Recommendations include: exercise counseling and nutrition counseling.      Krishna Clarke is not a smoker    AAA Screening:   Not needed    Return in about 9 months (around 9/10/2020).          This document has been electronically signed by Davin Castillo MD on December 10, 2019 8:32 AM

## 2019-12-12 RX ORDER — PNV NO.95/FERROUS FUM/FOLIC AC 28MG-0.8MG
1 TABLET ORAL DAILY
Qty: 30 TABLET | Refills: 12 | Status: SHIPPED | OUTPATIENT
Start: 2019-12-12 | End: 2020-06-19 | Stop reason: SDUPTHER

## 2019-12-17 ENCOUNTER — ROUTINE PRENATAL (OUTPATIENT)
Dept: OBSTETRICS AND GYNECOLOGY | Facility: CLINIC | Age: 24
End: 2019-12-17

## 2019-12-17 VITALS — SYSTOLIC BLOOD PRESSURE: 122 MMHG | BODY MASS INDEX: 43.43 KG/M2 | WEIGHT: 261 LBS | DIASTOLIC BLOOD PRESSURE: 84 MMHG

## 2019-12-17 DIAGNOSIS — O98.311 GENITAL HERPES AFFECTING PREGNANCY IN FIRST TRIMESTER: ICD-10-CM

## 2019-12-17 DIAGNOSIS — Z34.81 MULTIGRAVIDA IN FIRST TRIMESTER: ICD-10-CM

## 2019-12-17 DIAGNOSIS — O09.91 HIGH-RISK PREGNANCY, FIRST TRIMESTER: ICD-10-CM

## 2019-12-17 DIAGNOSIS — O09.299 PREVIOUS STILLBIRTH OR DEMISE, ANTEPARTUM: ICD-10-CM

## 2019-12-17 DIAGNOSIS — R00.2 PALPITATIONS: ICD-10-CM

## 2019-12-17 DIAGNOSIS — O21.9 NAUSEA AND VOMITING IN PREGNANCY PRIOR TO 22 WEEKS GESTATION: ICD-10-CM

## 2019-12-17 DIAGNOSIS — Z36.3 ENCOUNTER FOR ANTENATAL SCREENING FOR MALFORMATION USING ULTRASOUND: ICD-10-CM

## 2019-12-17 DIAGNOSIS — Z3A.16 16 WEEKS GESTATION OF PREGNANCY: Primary | ICD-10-CM

## 2019-12-17 DIAGNOSIS — A60.09 GENITAL HERPES AFFECTING PREGNANCY IN FIRST TRIMESTER: ICD-10-CM

## 2019-12-17 DIAGNOSIS — O99.212 OBESITY AFFECTING PREGNANCY IN SECOND TRIMESTER: ICD-10-CM

## 2019-12-17 PROCEDURE — 99213 OFFICE O/P EST LOW 20 MIN: CPT | Performed by: OBSTETRICS & GYNECOLOGY

## 2019-12-17 NOTE — PROGRESS NOTES
CC: Prenatal visit    Krishna Clarke is a 24 y.o.  at 16w3d.  Doing well.  Patient with some cramping, denies any vaginal bleeding at this time.  Reviewed NIPT testing with patient which was normal showing a male fetus.  Patient without any other concerns or complaints at this time.    /84   Wt 118 kg (261 lb)   LMP 08/10/2019 (Approximate)   BMI 43.43 kg/m²      Fetal Heart Rate: 145    10/19 Problems (from 10/24/19 to present)     Problem Noted Resolved    Nausea and vomiting in pregnancy prior to 22 weeks gestation 10/31/2019 by Sophie Landers APRN No    Overview Signed 10/31/2019  4:02 PM by Sophie Landers APRN     Zofran not helping; ordered Vitamin B6 10/31         High-risk pregnancy, first trimester 10/25/2019 by Sophie Landers APRN No    Overview Addendum 11/3/2019  9:17 PM by Sophie Landers APRN     A positive/neg antibody/Rubella Equivocal/ GBS unk  DatinTUS in ER @ 6w2d SULY 2020  Genetics:   Tdap: @ 28 wks  Flu: Declines  Anatomy: @ 20 wks  1h Glucola: @ 28 wks  H&H/Plts: @28 wks  H&H/Plts: .8/229 10/24/2019  Neg Sickle Cell trait/disease  Needs PAP 6 wk PP (ASCUS-neg. HPV 2017)           Palpitations 10/24/2019 by Sophie Landers APRN No    Overview Addendum 10/31/2019  4:03 PM by Sophie Landers APRN     Referred to Cardiology placed 10/24/19         Multigravida in first trimester 10/24/2019 by Sophie Landers APRN No    Obesity complicating pregnancy 2017 by Padmini Sims MD No    Genital herpes affecting pregnancy in first trimester 2017 by Padmini Sims MD No    Overview Signed 10/24/2019  1:07 PM by Sophie Landers APRN     Suppression at 35 weeks.          Previous stillbirth or demise, antepartum 2017 by Padmini Sims MD No    Overview Signed 10/25/2019  9:14 PM by Sophie Landers APRN     27 wks stillbirth in 2017; per patient, r/t to drug use         History of drug use 10/24/2019 by  Sophie Landers APRN 10/24/2019 by Sophie Landers APRN    Overview Addendum 10/24/2019 12:53 PM by Sophie Landers APRN      Hx of drug use with pregnancy in 2017 that resulted in a stillbirth.                A/P: Krishna Clarke is a 24 y.o.  at 16w3d.  Doing well with appropriately progressing pregnancy.  Bleeding and cramping precautions given.  Anatomy scan ordered.  Patient to follow-up in 3 weeks for anatomy scan and visit, return sooner as needed.  - RTC in 3 weeks     Diagnosis Plan   1. 16 weeks gestation of pregnancy     2. Nausea and vomiting in pregnancy prior to 22 weeks gestation     3. High-risk pregnancy, first trimester     4. Palpitations     5. Multigravida in first trimester     6. Genital herpes affecting pregnancy in first trimester     7. Previous stillbirth or demise, antepartum     8. Obesity affecting pregnancy in second trimester  US Ob Detail Fetal Anatomy Single or First Gestation   9. Encounter for  screening for malformation using ultrasound  US Ob Detail Fetal Anatomy Single or First Gestation     Gwyn Eaton DO  2019  9:06 AM

## 2020-01-03 DIAGNOSIS — Z13.79 GENETIC TESTING: Primary | ICD-10-CM

## 2020-01-06 DIAGNOSIS — Z13.79 GENETIC TESTING: ICD-10-CM

## 2020-01-07 ENCOUNTER — ROUTINE PRENATAL (OUTPATIENT)
Dept: OBSTETRICS AND GYNECOLOGY | Facility: CLINIC | Age: 25
End: 2020-01-07

## 2020-01-07 VITALS — BODY MASS INDEX: 43.6 KG/M2 | WEIGHT: 262 LBS | DIASTOLIC BLOOD PRESSURE: 67 MMHG | SYSTOLIC BLOOD PRESSURE: 118 MMHG

## 2020-01-07 DIAGNOSIS — O09.91 HIGH-RISK PREGNANCY, FIRST TRIMESTER: ICD-10-CM

## 2020-01-07 DIAGNOSIS — O09.299 PREVIOUS STILLBIRTH OR DEMISE, ANTEPARTUM: ICD-10-CM

## 2020-01-07 DIAGNOSIS — O98.311 GENITAL HERPES AFFECTING PREGNANCY IN FIRST TRIMESTER: ICD-10-CM

## 2020-01-07 DIAGNOSIS — Z3A.19 19 WEEKS GESTATION OF PREGNANCY: ICD-10-CM

## 2020-01-07 DIAGNOSIS — Z34.81 MULTIGRAVIDA IN FIRST TRIMESTER: ICD-10-CM

## 2020-01-07 DIAGNOSIS — R00.2 PALPITATIONS: ICD-10-CM

## 2020-01-07 DIAGNOSIS — Z03.79 ENCOUNTER FOR OTHER SUSPECTED MATERNAL AND FETAL CONDITIONS RULED OUT: Primary | ICD-10-CM

## 2020-01-07 DIAGNOSIS — O21.9 NAUSEA AND VOMITING IN PREGNANCY PRIOR TO 22 WEEKS GESTATION: ICD-10-CM

## 2020-01-07 DIAGNOSIS — A60.09 GENITAL HERPES AFFECTING PREGNANCY IN FIRST TRIMESTER: ICD-10-CM

## 2020-01-07 PROCEDURE — 99213 OFFICE O/P EST LOW 20 MIN: CPT | Performed by: OBSTETRICS & GYNECOLOGY

## 2020-01-07 NOTE — PROGRESS NOTES
CC: Prenatal visit    Krishna Clarke is a 24 y.o.  at 19w3d.  Doing well.  No complaints.  Denies contractions, LOF, or VB.  Reports good FM.  Anatomy scan today with multiple suboptimal views will need repeat in 3 to 4 weeks.  Patient is starting to have some symptoms of carpal tunnel syndrome when she had her last pregnancy does not want to start wrist braces just but will probably want them further in the pregnancy.  No other concerns or complaints at this time.    /67   Wt 119 kg (262 lb)   LMP 08/10/2019 (Approximate)   BMI 43.60 kg/m²        Fetal Heart Rate: 146    10/19 Problems (from 10/24/19 to present)     Problem Noted Resolved    Nausea and vomiting in pregnancy prior to 22 weeks gestation 10/31/2019 by Sophie Landers APRN No    Overview Signed 10/31/2019  4:02 PM by Sophie Landers APRN     Zofran not helping; ordered Vitamin B6 10/31         High-risk pregnancy, first trimester 10/25/2019 by Sophie Landers APRN No    Overview Addendum 11/3/2019  9:17 PM by Sophie Landers APRN     A positive/neg antibody/Rubella Equivocal/ GBS unk  DatinTUS in ER @ 6w2d SULY 2020  Genetics:   Tdap: @ 28 wks  Flu: Declines  Anatomy: @ 20 wks  1h Glucola: @ 28 wks  H&H/Plts: @28 wks  H&H/Plts: 12/35.8/229 10/24/2019  Neg Sickle Cell trait/disease  Needs PAP 6 wk PP (ASCUS-neg. HPV 2017)           Palpitations 10/24/2019 by Sophie Landers APRN No    Overview Addendum 10/31/2019  4:03 PM by Sophie Landers APRN     Referred to Cardiology placed 10/24/19         Multigravida in first trimester 10/24/2019 by Sophie Landers APRN No    Obesity complicating pregnancy 2017 by Padmini Sims MD No    Genital herpes affecting pregnancy in first trimester 2017 by Padmini Sims MD No    Overview Signed 10/24/2019  1:07 PM by Sophie Landers APRN     Suppression at 35 weeks.          Previous stillbirth or demise, antepartum 2017 by Bethany  Padmini Tobias MD No    Overview Signed 10/25/2019  9:14 PM by Sophie Landers APRN     27 wks stillbirth in 2017; per patient, r/t to drug use         History of drug use 10/24/2019 by Sophie Landers APRN 10/24/2019 by Sophie Landers APRN    Overview Addendum 10/24/2019 12:53 PM by Sophie Landers APRN      Hx of drug use with pregnancy in 2017 that resulted in a stillbirth.                A/P: Krishna Clarke is a 24 y.o.  at 19w3d.  Appropriately progressing pregnancy.  Repeat ultrasound in 4 weeks for suboptimal views.  Return to see me in 4 weeks, sooner as needed.  Bleeding and cramping precautions given.  - RTC in 4 weeks     Diagnosis Plan   1. Encounter for other suspected maternal and fetal conditions ruled out  US Ob Follow Up Transabdominal Approach   2. Nausea and vomiting in pregnancy prior to 22 weeks gestation     3. High-risk pregnancy, first trimester     4. Palpitations     5. Multigravida in first trimester     6. Genital herpes affecting pregnancy in first trimester     7. Previous stillbirth or demise, antepartum     8. 19 weeks gestation of pregnancy       Gwyn Eaton,   2020  1:37 PM

## 2020-01-14 DIAGNOSIS — Z3A.19 19 WEEKS GESTATION OF PREGNANCY: ICD-10-CM

## 2020-01-15 DIAGNOSIS — Z3A.19 19 WEEKS GESTATION OF PREGNANCY: ICD-10-CM

## 2020-01-28 ENCOUNTER — APPOINTMENT (OUTPATIENT)
Dept: GENERAL RADIOLOGY | Facility: HOSPITAL | Age: 25
End: 2020-01-28

## 2020-01-28 ENCOUNTER — HOSPITAL ENCOUNTER (EMERGENCY)
Facility: HOSPITAL | Age: 25
Discharge: HOME | End: 2020-01-28
Attending: EMERGENCY MEDICINE | Admitting: EMERGENCY MEDICINE

## 2020-01-28 VITALS
HEIGHT: 65 IN | HEART RATE: 74 BPM | SYSTOLIC BLOOD PRESSURE: 123 MMHG | OXYGEN SATURATION: 100 % | RESPIRATION RATE: 20 BRPM | WEIGHT: 269.5 LBS | DIASTOLIC BLOOD PRESSURE: 73 MMHG | BODY MASS INDEX: 44.9 KG/M2 | TEMPERATURE: 97.7 F

## 2020-01-28 DIAGNOSIS — Z3A.22 22 WEEKS GESTATION OF PREGNANCY: ICD-10-CM

## 2020-01-28 DIAGNOSIS — J45.909 PERSISTENT ASTHMA WITHOUT COMPLICATION, UNSPECIFIED ASTHMA SEVERITY: ICD-10-CM

## 2020-01-28 DIAGNOSIS — J06.9 UPPER RESPIRATORY TRACT INFECTION, UNSPECIFIED TYPE: Primary | ICD-10-CM

## 2020-01-28 LAB
FLUAV AG NPH QL: NEGATIVE
FLUBV AG NPH QL IA: NEGATIVE
HOLD SPECIMEN: NORMAL
HOLD SPECIMEN: NORMAL
WHOLE BLOOD HOLD SPECIMEN: NORMAL
WHOLE BLOOD HOLD SPECIMEN: NORMAL

## 2020-01-28 PROCEDURE — 99284 EMERGENCY DEPT VISIT MOD MDM: CPT

## 2020-01-28 PROCEDURE — 87804 INFLUENZA ASSAY W/OPTIC: CPT | Performed by: EMERGENCY MEDICINE

## 2020-01-28 PROCEDURE — 71046 X-RAY EXAM CHEST 2 VIEWS: CPT

## 2020-01-28 RX ORDER — AMOXICILLIN 875 MG/1
875 TABLET, COATED ORAL 2 TIMES DAILY
Qty: 14 TABLET | Refills: 0 | Status: SHIPPED | OUTPATIENT
Start: 2020-01-28 | End: 2020-02-04

## 2020-01-28 RX ORDER — ALBUTEROL SULFATE 90 UG/1
2 AEROSOL, METERED RESPIRATORY (INHALATION) EVERY 4 HOURS PRN
Qty: 1 INHALER | Refills: 0 | Status: SHIPPED | OUTPATIENT
Start: 2020-01-28 | End: 2021-07-28

## 2020-01-28 RX ORDER — BUDESONIDE AND FORMOTEROL FUMARATE DIHYDRATE 80; 4.5 UG/1; UG/1
2 AEROSOL RESPIRATORY (INHALATION)
Qty: 1 INHALER | Refills: 0 | Status: SHIPPED | OUTPATIENT
Start: 2020-01-28 | End: 2021-07-28

## 2020-01-28 RX ORDER — DEXTROMETHORPHAN POLISTIREX 30 MG/5ML
60 SUSPENSION ORAL 2 TIMES DAILY PRN
Qty: 280 ML | Refills: 0 | Status: SHIPPED | OUTPATIENT
Start: 2020-01-28 | End: 2020-04-08

## 2020-01-29 DIAGNOSIS — M25.569 KNEE PAIN, UNSPECIFIED CHRONICITY, UNSPECIFIED LATERALITY: Primary | ICD-10-CM

## 2020-01-30 ENCOUNTER — TELEPHONE (OUTPATIENT)
Dept: OBSTETRICS AND GYNECOLOGY | Facility: CLINIC | Age: 25
End: 2020-01-30

## 2020-01-30 NOTE — TELEPHONE ENCOUNTER
PATIENT IS WANTING TO KNOW IF SHE HAS HAD HER TDAP SHOT. PT NEEDS TO KNOW BECAUSE SHE WAS RECENTLY HIRED AT  AND WILL NEED TO GET THE SHOT BY TOMORROW

## 2020-01-31 ENCOUNTER — HOSPITAL ENCOUNTER (OUTPATIENT)
Dept: PHYSICAL THERAPY | Facility: HOSPITAL | Age: 25
Discharge: HOME | End: 2020-01-31
Admitting: OBSTETRICS & GYNECOLOGY

## 2020-01-31 DIAGNOSIS — M25.569 LATERAL KNEE PAIN, UNSPECIFIED LATERALITY: Primary | ICD-10-CM

## 2020-01-31 PROCEDURE — 97162 PT EVAL MOD COMPLEX 30 MIN: CPT | Performed by: PHYSICAL THERAPIST

## 2020-01-31 PROCEDURE — 97110 THERAPEUTIC EXERCISES: CPT | Performed by: PHYSICAL THERAPIST

## 2020-02-04 ENCOUNTER — ROUTINE PRENATAL (OUTPATIENT)
Dept: OBSTETRICS AND GYNECOLOGY | Facility: CLINIC | Age: 25
End: 2020-02-04

## 2020-02-04 VITALS — WEIGHT: 271.8 LBS | BODY MASS INDEX: 45.23 KG/M2 | DIASTOLIC BLOOD PRESSURE: 72 MMHG | SYSTOLIC BLOOD PRESSURE: 118 MMHG

## 2020-02-04 DIAGNOSIS — O98.311 GENITAL HERPES AFFECTING PREGNANCY IN FIRST TRIMESTER: ICD-10-CM

## 2020-02-04 DIAGNOSIS — Z34.81 MULTIGRAVIDA IN FIRST TRIMESTER: ICD-10-CM

## 2020-02-04 DIAGNOSIS — R00.2 PALPITATIONS: ICD-10-CM

## 2020-02-04 DIAGNOSIS — A60.09 GENITAL HERPES AFFECTING PREGNANCY IN FIRST TRIMESTER: ICD-10-CM

## 2020-02-04 DIAGNOSIS — O09.299 PREVIOUS STILLBIRTH OR DEMISE, ANTEPARTUM: ICD-10-CM

## 2020-02-04 DIAGNOSIS — Z3A.23 23 WEEKS GESTATION OF PREGNANCY: Primary | ICD-10-CM

## 2020-02-04 DIAGNOSIS — O21.9 NAUSEA AND VOMITING IN PREGNANCY PRIOR TO 22 WEEKS GESTATION: ICD-10-CM

## 2020-02-04 DIAGNOSIS — O09.91 HIGH-RISK PREGNANCY, FIRST TRIMESTER: ICD-10-CM

## 2020-02-04 PROCEDURE — 99213 OFFICE O/P EST LOW 20 MIN: CPT | Performed by: OBSTETRICS & GYNECOLOGY

## 2020-02-04 RX ORDER — VALACYCLOVIR HYDROCHLORIDE 500 MG/1
500 TABLET, FILM COATED ORAL DAILY
Qty: 30 TABLET | Refills: 3 | Status: SHIPPED | OUTPATIENT
Start: 2020-02-04 | End: 2020-03-05

## 2020-02-04 NOTE — PROGRESS NOTES
CC: Prenatal visit    Krishna Clarke is a 24 y.o.  at 23w3d.  Doing well.  No complaints.  Denies contractions, LOF, or VB.  Reports good FM.  Was treated for an upper respiratory tract infection last week, none start with a yeast infection which was also treated.  Also did have some problems with knee dislocation and is currently seeing physical therapy.  Patient to get repeat growth scan today for suboptimal views.    /72   Wt 123 kg (271 lb 12.8 oz)   LMP 08/10/2019 (Approximate)   BMI 45.23 kg/m²     Fundal Height (cm): 23 cm  Fetal Heart Rate: 140    10/19 Problems (from 10/24/19 to present)     Problem Noted Resolved    Nausea and vomiting in pregnancy prior to 22 weeks gestation 10/31/2019 by Sophie Landers APRN No    Overview Signed 10/31/2019  4:02 PM by Sophie Landers APRN     Zofran not helping; ordered Vitamin B6 10/31         High-risk pregnancy, first trimester 10/25/2019 by Sophie Landers APRN No    Overview Addendum 11/3/2019  9:17 PM by Sophie Landers APRN     A positive/neg antibody/Rubella Equivocal/ GBS unk  DatinTUS in ER @ 6w2d SULY 2020  Genetics:   Tdap: @ 28 wks  Flu: Declines  Anatomy: @ 20 wks  1h Glucola: @ 28 wks  H&H/Plts: @28 wks  H&H/Plts: 12/35.8/229 10/24/2019  Neg Sickle Cell trait/disease  Needs PAP 6 wk PP (ASCUS-neg. HPV 2017)           Palpitations 10/24/2019 by Sophie Landers APRN No    Overview Addendum 10/31/2019  4:03 PM by Sophie Landers APRN     Referred to Cardiology placed 10/24/19         Multigravida in first trimester 10/24/2019 by Sophie Landers APRN No    Obesity complicating pregnancy 2017 by Padmini Sims MD No    Genital herpes affecting pregnancy in first trimester 2017 by Padmini Sims MD No    Overview Signed 10/24/2019  1:07 PM by Sophie Landers APRN     Suppression at 35 weeks.          Previous stillbirth or demise, antepartum 2017 by Padmini Sims MD No     Overview Signed 10/25/2019  9:14 PM by Sophie Landers APRN     27 wks stillbirth in 2017; per patient, r/t to drug use         History of drug use 10/24/2019 by Sophie Landers APRN 10/24/2019 by Sophie Landers APRN    Overview Addendum 10/24/2019 12:53 PM by Sophie Landers APRN      Hx of drug use with pregnancy in 2017 that resulted in a stillbirth.                A/P: Krishna Clarke is a 24 y.o.  at 23w3d.  Overall stable doing well.  Plan to have patient start on Valtrex daily for recurrent HSV outbreaks.  Return to see me in 2 weeks, if not improving will increase Valtrex to twice a day.  Will order Glucola at next visit.  Patient to continue with physical therapy at this time.  Repeat ultrasound later today.  Bleeding and cramping precautions given, patient to return to see me sooner as needed.  - RTC in 2 weeks     Diagnosis Plan   1. 23 weeks gestation of pregnancy     2. Nausea and vomiting in pregnancy prior to 22 weeks gestation     3. High-risk pregnancy, first trimester     4. Palpitations     5. Multigravida in first trimester     6. Genital herpes affecting pregnancy in first trimester     7. Previous stillbirth or demise, antepartum       Gwyn Eaton DO  2020  10:13 AM

## 2020-02-06 ENCOUNTER — APPOINTMENT (OUTPATIENT)
Dept: PHYSICAL THERAPY | Facility: HOSPITAL | Age: 25
End: 2020-02-06

## 2020-02-07 DIAGNOSIS — L29.9 ITCHING: Primary | ICD-10-CM

## 2020-02-10 DIAGNOSIS — Z03.79 ENCOUNTER FOR OTHER SUSPECTED MATERNAL AND FETAL CONDITIONS RULED OUT: ICD-10-CM

## 2020-02-18 ENCOUNTER — ROUTINE PRENATAL (OUTPATIENT)
Dept: OBSTETRICS AND GYNECOLOGY | Facility: CLINIC | Age: 25
End: 2020-02-18

## 2020-02-18 VITALS — DIASTOLIC BLOOD PRESSURE: 70 MMHG | BODY MASS INDEX: 44.36 KG/M2 | WEIGHT: 266.6 LBS | SYSTOLIC BLOOD PRESSURE: 114 MMHG

## 2020-02-18 DIAGNOSIS — A60.09 GENITAL HERPES AFFECTING PREGNANCY IN FIRST TRIMESTER: ICD-10-CM

## 2020-02-18 DIAGNOSIS — Z03.79 ENCOUNTER FOR OTHER SUSPECTED MATERNAL AND FETAL CONDITIONS RULED OUT: ICD-10-CM

## 2020-02-18 DIAGNOSIS — O21.9 NAUSEA AND VOMITING IN PREGNANCY PRIOR TO 22 WEEKS GESTATION: ICD-10-CM

## 2020-02-18 DIAGNOSIS — Z34.81 MULTIGRAVIDA IN FIRST TRIMESTER: ICD-10-CM

## 2020-02-18 DIAGNOSIS — O09.299 PREVIOUS STILLBIRTH OR DEMISE, ANTEPARTUM: ICD-10-CM

## 2020-02-18 DIAGNOSIS — Z3A.23 23 WEEKS GESTATION OF PREGNANCY: ICD-10-CM

## 2020-02-18 DIAGNOSIS — R00.2 PALPITATIONS: ICD-10-CM

## 2020-02-18 DIAGNOSIS — O09.91 HIGH-RISK PREGNANCY, FIRST TRIMESTER: ICD-10-CM

## 2020-02-18 DIAGNOSIS — Z36.9 ENCOUNTER FOR ANTENATAL SCREENING: Primary | ICD-10-CM

## 2020-02-18 DIAGNOSIS — O98.311 GENITAL HERPES AFFECTING PREGNANCY IN FIRST TRIMESTER: ICD-10-CM

## 2020-02-18 PROCEDURE — 99213 OFFICE O/P EST LOW 20 MIN: CPT | Performed by: OBSTETRICS & GYNECOLOGY

## 2020-02-18 NOTE — PROGRESS NOTES
CC: Prenatal visit    Krishna Clarke is a 24 y.o.  at 25w3d.  Doing well.  No complaints.  Denies contractions, LOF, or VB.  Reports good FM.    /70   Wt 121 kg (266 lb 9.6 oz)   LMP 08/10/2019 (Approximate)   BMI 44.36 kg/m²     Fundal Height (cm): 25 cm  Fetal Heart Rate: 145    10/19 Problems (from 10/24/19 to present)     Problem Noted Resolved    Nausea and vomiting in pregnancy prior to 22 weeks gestation 10/31/2019 by Sophie Landers APRN No    Overview Signed 10/31/2019  4:02 PM by Sophie Landers APRN     Zofran not helping; ordered Vitamin B6 10/31         High-risk pregnancy, first trimester 10/25/2019 by Sophie Landers APRN No    Overview Addendum 11/3/2019  9:17 PM by Sophie Landers APRN     A positive/neg antibody/Rubella Equivocal/ GBS unk  DatinTUS in ER @ 6w2d SULY 2020  Genetics:   Tdap: @ 28 wks  Flu: Declines  Anatomy: @ 20 wks  1h Glucola: @ 28 wks  H&H/Plts: @28 wks  H&H/Plts: .8/229 10/24/2019  Neg Sickle Cell trait/disease  Needs PAP 6 wk PP (ASCUS-neg. HPV 2017)           Palpitations 10/24/2019 by Sophie Landers APRN No    Overview Addendum 10/31/2019  4:03 PM by Sophie Landers APRN     Referred to Cardiology placed 10/24/19         Multigravida in first trimester 10/24/2019 by Sophie Landers APRN No    Obesity complicating pregnancy 2017 by Padmini Sims MD No    Genital herpes affecting pregnancy in first trimester 2017 by Padmini Sims MD No    Overview Signed 10/24/2019  1:07 PM by Sophie Landers APRN     Suppression at 35 weeks.          Previous stillbirth or demise, antepartum 2017 by Padmini Sims MD No    Overview Signed 10/25/2019  9:14 PM by Sophie Landers APRN     27 wks stillbirth in 2017; per patient, r/t to drug use         History of drug use 10/24/2019 by Sophie Landers APRN 10/24/2019 by Sophie Landers APRN    Overview Addendum 10/24/2019 12:53 PM by  Sophie Landers APRN      Hx of drug use with pregnancy in 2017 that resulted in a stillbirth.                A/P: Krishna Clarke is a 24 y.o.  at 25w3d.  Pregnancy progressing appropriately at this time.  Patient to follow-up in 2 weeks, will need repeat ultrasound at that time for suboptimal views.  Plan to have patient do glucose at next visit.  Bleeding and cramping precautions given.  Patient to follow-up sooner as needed.  - RTC in 2 weeks     Diagnosis Plan   1. Encounter for other suspected maternal and fetal conditions ruled out  US ob follow up transabdominal approach   2. Nausea and vomiting in pregnancy prior to 22 weeks gestation     3. High-risk pregnancy, first trimester     4. Palpitations     5. Multigravida in first trimester     6. Genital herpes affecting pregnancy in first trimester     7. Previous stillbirth or demise, antepartum     8. 23 weeks gestation of pregnancy       Gwyn Eaton DO  2020  8:53 AM

## 2020-02-25 ENCOUNTER — ROUTINE PRENATAL (OUTPATIENT)
Dept: OBSTETRICS AND GYNECOLOGY | Facility: CLINIC | Age: 25
End: 2020-02-25

## 2020-02-25 ENCOUNTER — LAB (OUTPATIENT)
Dept: LAB | Facility: HOSPITAL | Age: 25
End: 2020-02-25

## 2020-02-25 VITALS — BODY MASS INDEX: 44.76 KG/M2 | WEIGHT: 269 LBS | DIASTOLIC BLOOD PRESSURE: 70 MMHG | SYSTOLIC BLOOD PRESSURE: 118 MMHG

## 2020-02-25 DIAGNOSIS — Z34.81 MULTIGRAVIDA IN FIRST TRIMESTER: ICD-10-CM

## 2020-02-25 DIAGNOSIS — L29.9 ITCHING: ICD-10-CM

## 2020-02-25 DIAGNOSIS — O09.91 HIGH-RISK PREGNANCY, FIRST TRIMESTER: ICD-10-CM

## 2020-02-25 DIAGNOSIS — Z36.9 ENCOUNTER FOR ANTENATAL SCREENING: ICD-10-CM

## 2020-02-25 DIAGNOSIS — O21.9 NAUSEA AND VOMITING IN PREGNANCY PRIOR TO 22 WEEKS GESTATION: ICD-10-CM

## 2020-02-25 DIAGNOSIS — M25.552 BILATERAL HIP PAIN: ICD-10-CM

## 2020-02-25 DIAGNOSIS — Z3A.26 26 WEEKS GESTATION OF PREGNANCY: ICD-10-CM

## 2020-02-25 DIAGNOSIS — Z03.79 ENCOUNTER FOR OTHER SUSPECTED MATERNAL AND FETAL CONDITIONS RULED OUT: ICD-10-CM

## 2020-02-25 DIAGNOSIS — M25.551 BILATERAL HIP PAIN: ICD-10-CM

## 2020-02-25 DIAGNOSIS — O99.213 OBESITY AFFECTING PREGNANCY IN THIRD TRIMESTER: Primary | ICD-10-CM

## 2020-02-25 DIAGNOSIS — O09.299 PREVIOUS STILLBIRTH OR DEMISE, ANTEPARTUM: ICD-10-CM

## 2020-02-25 DIAGNOSIS — A60.09 GENITAL HERPES AFFECTING PREGNANCY IN FIRST TRIMESTER: ICD-10-CM

## 2020-02-25 DIAGNOSIS — R00.2 PALPITATIONS: ICD-10-CM

## 2020-02-25 DIAGNOSIS — O98.311 GENITAL HERPES AFFECTING PREGNANCY IN FIRST TRIMESTER: ICD-10-CM

## 2020-02-25 LAB
ALBUMIN SERPL-MCNC: 3.4 G/DL (ref 3.5–5.2)
ALBUMIN/GLOB SERPL: 1 G/DL
ALP SERPL-CCNC: 48 U/L (ref 39–117)
ALT SERPL W P-5'-P-CCNC: 8 U/L (ref 1–33)
ANION GAP SERPL CALCULATED.3IONS-SCNC: 10.2 MMOL/L (ref 5–15)
AST SERPL-CCNC: 12 U/L (ref 1–32)
BILIRUB SERPL-MCNC: 0.4 MG/DL (ref 0.2–1.2)
BUN BLD-MCNC: 8 MG/DL (ref 6–20)
BUN/CREAT SERPL: 12.3 (ref 7–25)
CALCIUM SPEC-SCNC: 8.9 MG/DL (ref 8.6–10.5)
CHLORIDE SERPL-SCNC: 102 MMOL/L (ref 98–107)
CO2 SERPL-SCNC: 23.8 MMOL/L (ref 22–29)
CREAT BLD-MCNC: 0.65 MG/DL (ref 0.57–1)
GFR SERPL CREATININE-BSD FRML MDRD: 134 ML/MIN/1.73
GLOBULIN UR ELPH-MCNC: 3.3 GM/DL
GLUCOSE 1H P 100 G GLC PO SERPL-MCNC: 113 MG/DL (ref 60–140)
GLUCOSE BLD-MCNC: 106 MG/DL (ref 65–99)
POTASSIUM BLD-SCNC: 3.9 MMOL/L (ref 3.5–5.2)
PROT SERPL-MCNC: 6.7 G/DL (ref 6–8.5)
SODIUM BLD-SCNC: 136 MMOL/L (ref 136–145)

## 2020-02-25 PROCEDURE — 99213 OFFICE O/P EST LOW 20 MIN: CPT | Performed by: OBSTETRICS & GYNECOLOGY

## 2020-02-25 PROCEDURE — 80053 COMPREHEN METABOLIC PANEL: CPT | Performed by: OBSTETRICS & GYNECOLOGY

## 2020-02-25 PROCEDURE — 82239 BILE ACIDS TOTAL: CPT

## 2020-02-25 PROCEDURE — 36415 COLL VENOUS BLD VENIPUNCTURE: CPT | Performed by: OBSTETRICS & GYNECOLOGY

## 2020-02-25 PROCEDURE — 82950 GLUCOSE TEST: CPT

## 2020-02-25 NOTE — PROGRESS NOTES
CC: Prenatal visit    Krishna Clarke is a 24 y.o.  at 26w3d.  Doing well.  No complaints.  Denies contractions, LOF, or VB.  Reports good FM.  Patient is having some hip and pelvic pain, states that she has a history of a fractured pelvis.    /70   Wt 122 kg (269 lb)   LMP 08/10/2019 (Approximate)   BMI 44.76 kg/m²     Fundal Height (cm): 26 cm  Fetal Heart Rate: 141    10/19 Problems (from 10/24/19 to present)     Problem Noted Resolved    Nausea and vomiting in pregnancy prior to 22 weeks gestation 10/31/2019 by Sophie Landers APRN No    Overview Signed 10/31/2019  4:02 PM by Sophie Landers APRN     Zofran not helping; ordered Vitamin B6 10/31         High-risk pregnancy, first trimester 10/25/2019 by Sophie Landers APRN No    Overview Addendum 11/3/2019  9:17 PM by Sophie Landers APRN     A positive/neg antibody/Rubella Equivocal/ GBS unk  DatinTUS in ER @ 6w2d SULY 2020  Genetics:   Tdap: @ 28 wks  Flu: Declines  Anatomy: @ 20 wks  1h Glucola: @ 28 wks  H&H/Plts: @28 wks  H&H/Plts: .8/229 10/24/2019  Neg Sickle Cell trait/disease  Needs PAP 6 wk PP (ASCUS-neg. HPV 2017)           Palpitations 10/24/2019 by Sophie Landers APRN No    Overview Addendum 10/31/2019  4:03 PM by Sophie Landers APRN     Referred to Cardiology placed 10/24/19         Multigravida in first trimester 10/24/2019 by Sophie Landers APRN No    Obesity complicating pregnancy 2017 by Padmini Sims MD No    Genital herpes affecting pregnancy in first trimester 2017 by Padmini Sims MD No    Overview Signed 10/24/2019  1:07 PM by Sophie Landers APRN     Suppression at 35 weeks.          Previous stillbirth or demise, antepartum 2017 by Padmini Sims MD No    Overview Signed 10/25/2019  9:14 PM by Sophie Landers, APRN     27 wks stillbirth in 2017; per patient, r/t to drug use         History of drug use 10/24/2019 by Sophie Landers,  APRN 10/24/2019 by Sophie Landers APRN    Overview Addendum 10/24/2019 12:53 PM by Sophie Landers APRN      Hx of drug use with pregnancy in 2017 that resulted in a stillbirth.                A/P: Krishna Clarke is a 24 y.o.  at 26w3d.  Doing well with appropriately progressing pregnancy at this time reassuring ultrasound today 45th percentile.  Still with suboptimal views recommend repeat ultrasound in 3 weeks, patient to return to see me in 3 weeks.  Patient is doing Glucola today.  Fetal kick count and  labor precautions given.  Place consult for physical therapy to see if some relief from hip pain can be attained  - RTC in 2 weeks     Diagnosis Plan   1. Obesity affecting pregnancy in third trimester     2. Nausea and vomiting in pregnancy prior to 22 weeks gestation     3. High-risk pregnancy, first trimester     4. Palpitations     5. Multigravida in first trimester     6. Genital herpes affecting pregnancy in first trimester     7. Previous stillbirth or demise, antepartum     8. 26 weeks gestation of pregnancy     9. Encounter for other suspected maternal and fetal conditions ruled out  US Ob Follow Up Transabdominal Approach   10. Bilateral hip pain  Ambulatory Referral to Physical Therapy Evaluate and treat     Gwyn Eaton DO  2020  9:08 AM

## 2020-02-27 LAB — BILE AC SERPL-SCNC: 3 UMOL/L (ref 0–10)

## 2020-03-04 DIAGNOSIS — Z03.79 ENCOUNTER FOR OTHER SUSPECTED MATERNAL AND FETAL CONDITIONS RULED OUT: ICD-10-CM

## 2020-03-18 ENCOUNTER — ROUTINE PRENATAL (OUTPATIENT)
Dept: OBSTETRICS AND GYNECOLOGY | Facility: CLINIC | Age: 25
End: 2020-03-18

## 2020-03-18 VITALS — WEIGHT: 273 LBS | BODY MASS INDEX: 45.43 KG/M2 | SYSTOLIC BLOOD PRESSURE: 118 MMHG | DIASTOLIC BLOOD PRESSURE: 72 MMHG

## 2020-03-18 DIAGNOSIS — Z34.81 MULTIGRAVIDA IN FIRST TRIMESTER: ICD-10-CM

## 2020-03-18 DIAGNOSIS — A60.09 GENITAL HERPES AFFECTING PREGNANCY IN FIRST TRIMESTER: ICD-10-CM

## 2020-03-18 DIAGNOSIS — R00.2 PALPITATIONS: ICD-10-CM

## 2020-03-18 DIAGNOSIS — O21.9 NAUSEA AND VOMITING IN PREGNANCY PRIOR TO 22 WEEKS GESTATION: ICD-10-CM

## 2020-03-18 DIAGNOSIS — O09.91 HIGH-RISK PREGNANCY, FIRST TRIMESTER: ICD-10-CM

## 2020-03-18 DIAGNOSIS — Z3A.29 29 WEEKS GESTATION OF PREGNANCY: Primary | ICD-10-CM

## 2020-03-18 DIAGNOSIS — O98.311 GENITAL HERPES AFFECTING PREGNANCY IN FIRST TRIMESTER: ICD-10-CM

## 2020-03-18 DIAGNOSIS — O09.299 PREVIOUS STILLBIRTH OR DEMISE, ANTEPARTUM: ICD-10-CM

## 2020-03-18 PROCEDURE — 99213 OFFICE O/P EST LOW 20 MIN: CPT | Performed by: OBSTETRICS & GYNECOLOGY

## 2020-03-18 NOTE — PROGRESS NOTES
CC: Prenatal visit    Krishna Clarke is a 24 y.o.  at 29w4d.  Doing well.  No complaints.  Denies contractions, LOF, or VB.  Reports good FM.  Repeat ultrasound today with all suboptimal views seen baby at the 23rd percentile.  Patient is still having hip pain.  Pregnancy belt given today.    /72   Wt 124 kg (273 lb)   LMP 08/10/2019 (Approximate)   BMI 45.43 kg/m²     Fundal Height (cm): 29 cm  Fetal Heart Rate: 141    10/19 Problems (from 10/24/19 to present)     Problem Noted Resolved    Nausea and vomiting in pregnancy prior to 22 weeks gestation 10/31/2019 by Sophie Landers APRN No    Overview Signed 10/31/2019  4:02 PM by Sophie Landers APRN     Zofran not helping; ordered Vitamin B6 10/31         High-risk pregnancy, first trimester 10/25/2019 by Sophie Landers APRN No    Overview Addendum 11/3/2019  9:17 PM by Sophie Landers APRN     A positive/neg antibody/Rubella Equivocal/ GBS unk  DatinTUS in ER @ 6w2d SULY 2020  Genetics:   Tdap: @ 28 wks  Flu: Declines  Anatomy: @ 20 wks  1h Glucola: @ 28 wks  H&H/Plts: @28 wks  H&H/Plts: .8/229 10/24/2019  Neg Sickle Cell trait/disease  Needs PAP 6 wk PP (ASCUS-neg. HPV 2017)           Palpitations 10/24/2019 by Sophie Landers APRN No    Overview Addendum 10/31/2019  4:03 PM by Sophie Landers APRN     Referred to Cardiology placed 10/24/19         Multigravida in first trimester 10/24/2019 by Sophie Landers APRN No    Obesity complicating pregnancy 2017 by Padmini Sims MD No    Genital herpes affecting pregnancy in first trimester 2017 by Padmini Sims MD No    Overview Signed 10/24/2019  1:07 PM by Sophie Landers APRN     Suppression at 35 weeks.          Previous stillbirth or demise, antepartum 2017 by Padmini Sims MD No    Overview Signed 10/25/2019  9:14 PM by Sophie Landers APRN     27 wks stillbirth in 2017; per patient, r/t to drug use          History of drug use 10/24/2019 by Sophie Landers APRN 10/24/2019 by Sophie Landers APRN    Overview Addendum 10/24/2019 12:53 PM by Sophie Landers APRN      Hx of drug use with pregnancy in 2017 that resulted in a stillbirth.                A/P: Krishna Clarke is a 24 y.o.  at 29w4d.  Doing well overall progressing appropriately all suboptimal as reviewed growth good.  Patient to follow-up in 2 weeks, sooner as needed.  Will start weekly BPP's after next visit.  Fetal kick count and  labor precautions given.  - RTC in 2 weeks     Diagnosis Plan   1. 29 weeks gestation of pregnancy     2. Nausea and vomiting in pregnancy prior to 22 weeks gestation     3. High-risk pregnancy, first trimester     4. Palpitations     5. Multigravida in first trimester     6. Genital herpes affecting pregnancy in first trimester     7. Previous stillbirth or demise, antepartum       Gwyn Eaton DO  3/18/2020  14:29

## 2020-04-08 ENCOUNTER — TELEPHONE (OUTPATIENT)
Dept: OBSTETRICS AND GYNECOLOGY | Facility: CLINIC | Age: 25
End: 2020-04-08

## 2020-04-08 ENCOUNTER — ROUTINE PRENATAL (OUTPATIENT)
Dept: OBSTETRICS AND GYNECOLOGY | Facility: CLINIC | Age: 25
End: 2020-04-08

## 2020-04-08 VITALS — SYSTOLIC BLOOD PRESSURE: 107 MMHG | DIASTOLIC BLOOD PRESSURE: 72 MMHG | WEIGHT: 276 LBS | BODY MASS INDEX: 45.93 KG/M2

## 2020-04-08 DIAGNOSIS — O98.313 GENITAL HERPES AFFECTING PREGNANCY IN THIRD TRIMESTER: ICD-10-CM

## 2020-04-08 DIAGNOSIS — J45.909 ASTHMA, UNSPECIFIED ASTHMA SEVERITY, UNSPECIFIED WHETHER COMPLICATED, UNSPECIFIED WHETHER PERSISTENT: ICD-10-CM

## 2020-04-08 DIAGNOSIS — Z3A.32 32 WEEKS GESTATION OF PREGNANCY: Primary | ICD-10-CM

## 2020-04-08 DIAGNOSIS — O09.299 PREVIOUS STILLBIRTH OR DEMISE, ANTEPARTUM: ICD-10-CM

## 2020-04-08 DIAGNOSIS — O99.343 DEPRESSION DURING PREGNANCY IN THIRD TRIMESTER: ICD-10-CM

## 2020-04-08 DIAGNOSIS — O99.213 OBESITY AFFECTING PREGNANCY IN THIRD TRIMESTER: ICD-10-CM

## 2020-04-08 DIAGNOSIS — A60.09 GENITAL HERPES AFFECTING PREGNANCY IN THIRD TRIMESTER: ICD-10-CM

## 2020-04-08 DIAGNOSIS — F32.A DEPRESSION DURING PREGNANCY IN THIRD TRIMESTER: ICD-10-CM

## 2020-04-08 PROBLEM — O21.9 NAUSEA AND VOMITING IN PREGNANCY PRIOR TO 22 WEEKS GESTATION: Status: RESOLVED | Noted: 2019-10-31 | Resolved: 2020-04-08

## 2020-04-08 PROBLEM — O09.93 HIGH-RISK PREGNANCY IN THIRD TRIMESTER: Status: ACTIVE | Noted: 2019-10-25

## 2020-04-08 PROBLEM — Z34.81 MULTIGRAVIDA IN FIRST TRIMESTER: Status: RESOLVED | Noted: 2019-10-24 | Resolved: 2020-04-08

## 2020-04-08 PROCEDURE — 99213 OFFICE O/P EST LOW 20 MIN: CPT | Performed by: OBSTETRICS & GYNECOLOGY

## 2020-04-08 NOTE — PROGRESS NOTES
CC: Prenatal visit    Krishna Clarke is a 25 y.o.  at 32w4d.  Doing well.  No complaints.  Denies contractions, LOF, or VB.  Reports good FM.    /72   Wt 125 kg (276 lb)   LMP 08/10/2019 (Approximate)   BMI 45.93 kg/m²     Fundal Height (cm): 32 cm  Fetal Heart Rate: 155    10/19 Problems (from 10/24/19 to present)     Problem Noted Resolved    High-risk pregnancy in third trimester 10/25/2019 by Sophie Landers APRN No    Overview Addendum 11/3/2019  9:17 PM by Sophie Landers APRN     A positive/neg antibody/Rubella Equivocal/ GBS unk  DatinTUS in ER @ 6w2d SULY 2020  Genetics:   Tdap: @ 28 wks  Flu: Declines  Anatomy: @ 20 wks  1h Glucola: @ 28 wks  H&H/Plts: @28 wks  H&H/Plts: 35.8/229 10/24/2019  Neg Sickle Cell trait/disease  Needs PAP 6 wk PP (ASCUS-neg. HPV 2017)           Palpitations 10/24/2019 by Sophie Landers APRN No    Overview Addendum 10/31/2019  4:03 PM by Sophie Landers APRN     Referred to Cardiology placed 10/24/19         Obesity complicating pregnancy 2017 by Padmini Sims MD No    Genital herpes affecting pregnancy in third trimester 2017 by Padmini Sims MD No    Overview Signed 10/24/2019  1:07 PM by Sophie Landers APRN     Suppression at 35 weeks.          Previous stillbirth or demise, antepartum 2017 by Padmini Sims MD No    Overview Signed 10/25/2019  9:14 PM by Sophie Landers APRN     27 wks stillbirth in 2017; per patient, r/t to drug use         Nausea and vomiting in pregnancy prior to 22 weeks gestation 10/31/2019 by Sophie Landers APRN 2020 by Gwyn Eaton DO    Overview Signed 10/31/2019  4:02 PM by Sophie Landers APRN     Zofran not helping; ordered Vitamin B6 10/31         History of drug use 10/24/2019 by Sophie Landers APRN 10/24/2019 by Sophie Landers APRN    Overview Addendum 10/24/2019 12:53 PM by Sophie Landers APRN      Hx of drug use with  pregnancy in 2017 that resulted in a stillbirth.          Multigravida in first trimester 10/24/2019 by oSphie Landers, APRN 2020 by Gwyn Eaton DO          A/P: Krishna Clarke is a 25 y.o.  at 32w4d.  Doing well with appropriately progressing pregnancy, does have some increased pressure.  Fetal kick count and  labor precautions given.  Patient to get BPP today will need  testing starting from here forward.  Patient is transferring care to provider in [place].  Return here as needed.  -Patient to schedule appointment with new provider soon as possible  - Reviewed COVID-19 visitation policy  - Reviewed COVID-19 precautions     Diagnosis Plan   1. 32 weeks gestation of pregnancy     2. Obesity affecting pregnancy in third trimester     3. Genital herpes affecting pregnancy in third trimester     4. Previous stillbirth or demise, antepartum     5. Asthma, unspecified asthma severity, unspecified whether complicated, unspecified whether persistent     6. Depression during pregnancy in third trimester       Gwyn Eaton DO  2020  10:07

## 2020-04-13 ENCOUNTER — PROCEDURE VISIT (OUTPATIENT)
Dept: OBSTETRICS AND GYNECOLOGY | Facility: CLINIC | Age: 25
End: 2020-04-13

## 2020-04-13 ENCOUNTER — INITIAL PRENATAL (OUTPATIENT)
Dept: OBSTETRICS AND GYNECOLOGY | Facility: CLINIC | Age: 25
End: 2020-04-13

## 2020-04-13 VITALS — SYSTOLIC BLOOD PRESSURE: 103 MMHG | WEIGHT: 278 LBS | DIASTOLIC BLOOD PRESSURE: 65 MMHG | BODY MASS INDEX: 46.26 KG/M2

## 2020-04-13 DIAGNOSIS — Z36.89 ENCOUNTER FOR ULTRASOUND TO CHECK FETAL GROWTH: ICD-10-CM

## 2020-04-13 DIAGNOSIS — O99.213 OBESITY AFFECTING PREGNANCY IN THIRD TRIMESTER: ICD-10-CM

## 2020-04-13 DIAGNOSIS — O09.93 HIGH-RISK PREGNANCY IN THIRD TRIMESTER: ICD-10-CM

## 2020-04-13 DIAGNOSIS — O09.299 PREVIOUS STILLBIRTH OR DEMISE, ANTEPARTUM: Primary | ICD-10-CM

## 2020-04-13 DIAGNOSIS — O99.213 OBESITY AFFECTING PREGNANCY IN THIRD TRIMESTER: Primary | ICD-10-CM

## 2020-04-13 DIAGNOSIS — O09.299 PREVIOUS STILLBIRTH OR DEMISE, ANTEPARTUM: ICD-10-CM

## 2020-04-13 DIAGNOSIS — Z3A.33 33 WEEKS GESTATION OF PREGNANCY: ICD-10-CM

## 2020-04-13 LAB
GLUCOSE UR STRIP-MCNC: NEGATIVE MG/DL
PROT UR STRIP-MCNC: ABNORMAL MG/DL

## 2020-04-13 PROCEDURE — 99214 OFFICE O/P EST MOD 30 MIN: CPT | Performed by: OBSTETRICS & GYNECOLOGY

## 2020-04-13 PROCEDURE — 76816 OB US FOLLOW-UP PER FETUS: CPT | Performed by: OBSTETRICS & GYNECOLOGY

## 2020-04-13 NOTE — PROGRESS NOTES
Chief Complaint   Patient presents with   • Routine Prenatal Visit     HPI- Pt is 25 y.o.  at 33w2d here for prenatal visit.  Patient is a transfer of care from [place].  Patient is known to me from her fourth pregnancy.  She has no major complaints today and reports adequate fetal movement.  Her prenatal records from [place] were reviewed with her and she has had no major complications with this pregnancy.    ROS-     - No vaginal bleeding    GI- No abdominal pain    /65   Wt 126 kg (278 lb)   LMP 08/10/2019 (Approximate)   BMI 46.26 kg/m²   Exam - See flow sheet    Fetal heart rate is normal    Assessment-  Diagnoses and all orders for this visit:    Previous stillbirth or demise, antepartum    High-risk pregnancy in third trimester    Obesity affecting pregnancy in third trimester    33 weeks gestation of pregnancy    Other orders  -     POC Urinalysis Dipstick    Growth ultrasound was performed today due to previous history of fetal demise.  Growth ultrasound shows appropriate fetal growth and ultrasound was reviewed with her.  Patient has completed her 1 hour glucose test.  We will begin weekly BPP's due to history of fetal demise and she will follow-up with me in 2 weeks.    Counseling was given to patient for the following topics: diagnostic results, risk factor reductions, prognosis and patient and family education . Total time of the encounter was 25 minutes and 15 minutes was spend counseling.

## 2020-04-20 ENCOUNTER — PROCEDURE VISIT (OUTPATIENT)
Dept: OBSTETRICS AND GYNECOLOGY | Facility: CLINIC | Age: 25
End: 2020-04-20

## 2020-04-20 DIAGNOSIS — O99.213 OBESITY AFFECTING PREGNANCY IN THIRD TRIMESTER: Primary | ICD-10-CM

## 2020-04-20 DIAGNOSIS — O09.299 PREVIOUS STILLBIRTH OR DEMISE, ANTEPARTUM: ICD-10-CM

## 2020-04-20 PROCEDURE — 76819 FETAL BIOPHYS PROFIL W/O NST: CPT | Performed by: OBSTETRICS & GYNECOLOGY

## 2020-04-23 ENCOUNTER — TELEPHONE (OUTPATIENT)
Dept: OBSTETRICS AND GYNECOLOGY | Facility: CLINIC | Age: 25
End: 2020-04-23

## 2020-04-23 NOTE — TELEPHONE ENCOUNTER
She should just monitor for any regular, painful contractions, leaking of fluid or vaginal bleeding. If she experiences any of those symptoms, she should go to labor and delivery. Loss of the mucous plug does not indicate that labor will occur.

## 2020-04-23 NOTE — TELEPHONE ENCOUNTER
Patient wanted to let you know she thinks she lost her mucous plug.  No contractions or anything else.

## 2020-04-27 ENCOUNTER — ROUTINE PRENATAL (OUTPATIENT)
Dept: OBSTETRICS AND GYNECOLOGY | Facility: CLINIC | Age: 25
End: 2020-04-27

## 2020-04-27 ENCOUNTER — PROCEDURE VISIT (OUTPATIENT)
Dept: OBSTETRICS AND GYNECOLOGY | Facility: CLINIC | Age: 25
End: 2020-04-27

## 2020-04-27 VITALS — DIASTOLIC BLOOD PRESSURE: 75 MMHG | SYSTOLIC BLOOD PRESSURE: 117 MMHG | WEIGHT: 280.6 LBS | BODY MASS INDEX: 46.69 KG/M2

## 2020-04-27 DIAGNOSIS — O99.213 OBESITY AFFECTING PREGNANCY IN THIRD TRIMESTER: ICD-10-CM

## 2020-04-27 DIAGNOSIS — O99.213 OBESITY AFFECTING PREGNANCY IN THIRD TRIMESTER: Primary | ICD-10-CM

## 2020-04-27 DIAGNOSIS — O09.299 PREVIOUS STILLBIRTH OR DEMISE, ANTEPARTUM: Primary | ICD-10-CM

## 2020-04-27 DIAGNOSIS — O09.93 HIGH-RISK PREGNANCY IN THIRD TRIMESTER: ICD-10-CM

## 2020-04-27 DIAGNOSIS — Z3A.35 35 WEEKS GESTATION OF PREGNANCY: ICD-10-CM

## 2020-04-27 DIAGNOSIS — O09.299 PREVIOUS STILLBIRTH OR DEMISE, ANTEPARTUM: ICD-10-CM

## 2020-04-27 LAB
GLUCOSE UR STRIP-MCNC: NEGATIVE MG/DL
PROT UR STRIP-MCNC: ABNORMAL MG/DL

## 2020-04-27 PROCEDURE — 76819 FETAL BIOPHYS PROFIL W/O NST: CPT | Performed by: OBSTETRICS & GYNECOLOGY

## 2020-04-27 PROCEDURE — 99213 OFFICE O/P EST LOW 20 MIN: CPT | Performed by: OBSTETRICS & GYNECOLOGY

## 2020-04-27 NOTE — PROGRESS NOTES
Chief Complaint   Patient presents with   • Routine Prenatal Visit     HPI- Pt is 25 y.o.  at 35w2d here for prenatal visit.  Patient reports some cramping.  She denies any bleeding and reports adequate fetal movement.    ROS-     - No vaginal bleeding    GI- No abdominal pain    /75   Wt 127 kg (280 lb 9.6 oz)   LMP 08/10/2019 (Approximate)   BMI 46.69 kg/m²   Exam - See flow sheet    Fetal heart rate is normal    Assessment-  Diagnoses and all orders for this visit:    Previous stillbirth or demise, antepartum    High-risk pregnancy in third trimester    Obesity affecting pregnancy in third trimester    35 weeks gestation of pregnancy  -     Group B Streptococcus Culture - Swab, Vaginal/Rectum    Other orders  -     POC Urinalysis Dipstick    BPP today was 8 out of 8.  GBS culture was obtained.  Patient is taking suppressive Valtrex and she was advised to continue on suppressive Valtrex through the remainder of her pregnancy.  She will follow-up weekly with BPP's.    Counseling was given to patient for the following topics: diagnostic results, instructions for management and patient and family education . Total time of the encounter was 15 minutes and 10 minutes was spend counseling.

## 2020-04-30 LAB — B-HEM STREP SPEC QL CULT: POSITIVE

## 2020-05-01 PROBLEM — B95.1 POSITIVE GBS TEST: Status: ACTIVE | Noted: 2020-05-01

## 2020-05-04 ENCOUNTER — ROUTINE PRENATAL (OUTPATIENT)
Dept: OBSTETRICS AND GYNECOLOGY | Facility: CLINIC | Age: 25
End: 2020-05-04

## 2020-05-04 ENCOUNTER — PROCEDURE VISIT (OUTPATIENT)
Dept: OBSTETRICS AND GYNECOLOGY | Facility: CLINIC | Age: 25
End: 2020-05-04

## 2020-05-04 VITALS — SYSTOLIC BLOOD PRESSURE: 112 MMHG | BODY MASS INDEX: 47.16 KG/M2 | DIASTOLIC BLOOD PRESSURE: 73 MMHG | WEIGHT: 283.4 LBS

## 2020-05-04 DIAGNOSIS — O09.93 HIGH-RISK PREGNANCY IN THIRD TRIMESTER: Primary | ICD-10-CM

## 2020-05-04 DIAGNOSIS — O99.213 OBESITY AFFECTING PREGNANCY IN THIRD TRIMESTER: ICD-10-CM

## 2020-05-04 DIAGNOSIS — Z3A.36 36 WEEKS GESTATION OF PREGNANCY: ICD-10-CM

## 2020-05-04 DIAGNOSIS — A60.09 GENITAL HERPES AFFECTING PREGNANCY IN THIRD TRIMESTER: ICD-10-CM

## 2020-05-04 DIAGNOSIS — O99.213 OBESITY AFFECTING PREGNANCY IN THIRD TRIMESTER: Primary | ICD-10-CM

## 2020-05-04 DIAGNOSIS — O09.299 PREVIOUS STILLBIRTH OR DEMISE, ANTEPARTUM: ICD-10-CM

## 2020-05-04 DIAGNOSIS — O98.313 GENITAL HERPES AFFECTING PREGNANCY IN THIRD TRIMESTER: ICD-10-CM

## 2020-05-04 LAB
GLUCOSE UR STRIP-MCNC: NEGATIVE MG/DL
PROT UR STRIP-MCNC: ABNORMAL MG/DL

## 2020-05-04 PROCEDURE — 76819 FETAL BIOPHYS PROFIL W/O NST: CPT | Performed by: OBSTETRICS & GYNECOLOGY

## 2020-05-04 PROCEDURE — 99213 OFFICE O/P EST LOW 20 MIN: CPT | Performed by: OBSTETRICS & GYNECOLOGY

## 2020-05-04 NOTE — PROGRESS NOTES
Chief Complaint   Patient presents with   • Routine Prenatal Visit     HPI- Pt is 25 y.o.  at 36w2d here for prenatal visit.  Patient complains of pressure and hot flashes at night.  She denies any painful contractions.  She reports adequate fetal movement.    ROS-     - No vaginal bleeding    GI- No abdominal pain    /73   Wt 129 kg (283 lb 6.4 oz)   LMP 08/10/2019 (Approximate)   BMI 47.16 kg/m²   Exam - See flow sheet    Fetal heart rate is normal    Assessment-  Diagnoses and all orders for this visit:    High-risk pregnancy in third trimester    Genital herpes affecting pregnancy in third trimester    Previous stillbirth or demise, antepartum    Obesity affecting pregnancy in third trimester    36 weeks gestation of pregnancy    Other orders  -     POC Urinalysis Dipstick    Reviewed positive GBS culture and need for penicillin.  BPP today was 8 out of 8.  Patient will continue with weekly OB follow-ups and plan induction of labor between 39 to 40 weeks.    Counseling was given to patient for the following topics: diagnostic results, instructions for management and patient and family education . Total time of the encounter was 15 minutes and 10 minutes was spend counseling.

## 2020-05-11 ENCOUNTER — PROCEDURE VISIT (OUTPATIENT)
Dept: OBSTETRICS AND GYNECOLOGY | Facility: CLINIC | Age: 25
End: 2020-05-11

## 2020-05-11 ENCOUNTER — ROUTINE PRENATAL (OUTPATIENT)
Dept: OBSTETRICS AND GYNECOLOGY | Facility: CLINIC | Age: 25
End: 2020-05-11

## 2020-05-11 VITALS — BODY MASS INDEX: 47.59 KG/M2 | WEIGHT: 286 LBS | SYSTOLIC BLOOD PRESSURE: 125 MMHG | DIASTOLIC BLOOD PRESSURE: 70 MMHG

## 2020-05-11 DIAGNOSIS — O99.213 OBESITY AFFECTING PREGNANCY IN THIRD TRIMESTER: ICD-10-CM

## 2020-05-11 DIAGNOSIS — O98.313 GENITAL HERPES AFFECTING PREGNANCY IN THIRD TRIMESTER: ICD-10-CM

## 2020-05-11 DIAGNOSIS — A60.09 GENITAL HERPES AFFECTING PREGNANCY IN THIRD TRIMESTER: ICD-10-CM

## 2020-05-11 DIAGNOSIS — O09.93 HIGH-RISK PREGNANCY IN THIRD TRIMESTER: ICD-10-CM

## 2020-05-11 DIAGNOSIS — O09.299 PREVIOUS STILLBIRTH OR DEMISE, ANTEPARTUM: Primary | ICD-10-CM

## 2020-05-11 DIAGNOSIS — Z3A.37 37 WEEKS GESTATION OF PREGNANCY: Primary | ICD-10-CM

## 2020-05-11 DIAGNOSIS — O09.299 PREVIOUS STILLBIRTH OR DEMISE, ANTEPARTUM: ICD-10-CM

## 2020-05-11 LAB
GLUCOSE UR STRIP-MCNC: NEGATIVE MG/DL
PROT UR STRIP-MCNC: ABNORMAL MG/DL

## 2020-05-11 PROCEDURE — 76816 OB US FOLLOW-UP PER FETUS: CPT | Performed by: OBSTETRICS & GYNECOLOGY

## 2020-05-11 PROCEDURE — 99213 OFFICE O/P EST LOW 20 MIN: CPT | Performed by: OBSTETRICS & GYNECOLOGY

## 2020-05-11 PROCEDURE — 76819 FETAL BIOPHYS PROFIL W/O NST: CPT | Performed by: OBSTETRICS & GYNECOLOGY

## 2020-05-11 NOTE — PROGRESS NOTES
Chief Complaint   Patient presents with   • Routine Prenatal Visit     HPI- Pt is 25 y.o.  at 37w2d here for prenatal visit.  Patient complains of hip pain.  She denies any regular contractions, leaking, or bleeding.  She reports adequate fetal movement.    ROS-     - No vaginal bleeding    GI- No abdominal pain    /70   Wt 130 kg (286 lb)   LMP 08/10/2019 (Approximate)   BMI 47.59 kg/m²   Exam - See flow sheet    Fetal heart rate is normal    Assessment-  Diagnoses and all orders for this visit:    37 weeks gestation of pregnancy    High-risk pregnancy in third trimester    Genital herpes affecting pregnancy in third trimester    Previous stillbirth or demise, antepartum    Obesity affecting pregnancy in third trimester    Other orders  -     POC Urinalysis Dipstick    BPP is 8 out of 8.  Labor precautions discussed.  If undelivered next week, we will schedule induction between 39-40 weeks.    Counseling was given to patient for the following topics: diagnostic results, instructions for management and patient and family education . Total time of the encounter was 15 minutes and 10 minutes was spend counseling.

## 2020-05-18 ENCOUNTER — PROCEDURE VISIT (OUTPATIENT)
Dept: OBSTETRICS AND GYNECOLOGY | Facility: CLINIC | Age: 25
End: 2020-05-18

## 2020-05-18 ENCOUNTER — ROUTINE PRENATAL (OUTPATIENT)
Dept: OBSTETRICS AND GYNECOLOGY | Facility: CLINIC | Age: 25
End: 2020-05-18

## 2020-05-18 ENCOUNTER — TELEPHONE (OUTPATIENT)
Dept: OBSTETRICS AND GYNECOLOGY | Facility: CLINIC | Age: 25
End: 2020-05-18

## 2020-05-18 VITALS — WEIGHT: 289.6 LBS | BODY MASS INDEX: 48.19 KG/M2 | SYSTOLIC BLOOD PRESSURE: 118 MMHG | DIASTOLIC BLOOD PRESSURE: 72 MMHG

## 2020-05-18 DIAGNOSIS — O09.299 PREVIOUS STILLBIRTH OR DEMISE, ANTEPARTUM: Primary | ICD-10-CM

## 2020-05-18 DIAGNOSIS — O09.93 HIGH-RISK PREGNANCY IN THIRD TRIMESTER: ICD-10-CM

## 2020-05-18 DIAGNOSIS — Z3A.38 38 WEEKS GESTATION OF PREGNANCY: Primary | ICD-10-CM

## 2020-05-18 DIAGNOSIS — O09.299 PREVIOUS STILLBIRTH OR DEMISE, ANTEPARTUM: ICD-10-CM

## 2020-05-18 DIAGNOSIS — O99.213 OBESITY AFFECTING PREGNANCY IN THIRD TRIMESTER: ICD-10-CM

## 2020-05-18 LAB
GLUCOSE UR STRIP-MCNC: NEGATIVE MG/DL
PROT UR STRIP-MCNC: ABNORMAL MG/DL

## 2020-05-18 PROCEDURE — 99213 OFFICE O/P EST LOW 20 MIN: CPT | Performed by: OBSTETRICS & GYNECOLOGY

## 2020-05-18 PROCEDURE — 76819 FETAL BIOPHYS PROFIL W/O NST: CPT | Performed by: OBSTETRICS & GYNECOLOGY

## 2020-05-18 NOTE — TELEPHONE ENCOUNTER
With next Tuesday for this patient, can I put her on the 1:15 spot?  I didn't want to take a new OB slot- and im trying to get it close to the BPP as they are at separate offices?

## 2020-05-18 NOTE — PROGRESS NOTES
Chief Complaint   Patient presents with   • Routine Prenatal Visit     HPI- Pt is 25 y.o.  at 38w2d here for prenatal visit.  Patient complains of feet swelling and reports that they were painful over the weekend.  She states the swelling has improved today.  She reports adequate fetal movement.    ROS-     - No vaginal bleeding    GI- No abdominal pain    /72   Wt 131 kg (289 lb 9.6 oz)   LMP 08/10/2019 (Approximate)   BMI 48.19 kg/m²   Exam - See flow sheet    Fetal heart rate is normal    Assessment-  Diagnoses and all orders for this visit:    38 weeks gestation of pregnancy    High-risk pregnancy in third trimester    Previous stillbirth or demise, antepartum    Obesity affecting pregnancy in third trimester    Other orders  -     POC Urinalysis Dipstick    BPP today is 8 out of 8.  Patient was counseled on management options for her swelling including elevating her feet when able and compression stockings.  She will see me back next Tuesday with BPP and we will plan induction of labor next Wednesday.    Counseling was given to patient for the following topics: diagnostic results, instructions for management and patient and family education . Total time of the encounter was 15 minutes and 10 minutes was spend counseling.

## 2020-05-26 ENCOUNTER — PROCEDURE VISIT (OUTPATIENT)
Dept: OBSTETRICS AND GYNECOLOGY | Facility: CLINIC | Age: 25
End: 2020-05-26

## 2020-05-26 ENCOUNTER — ROUTINE PRENATAL (OUTPATIENT)
Dept: OBSTETRICS AND GYNECOLOGY | Facility: CLINIC | Age: 25
End: 2020-05-26

## 2020-05-26 VITALS — SYSTOLIC BLOOD PRESSURE: 111 MMHG | DIASTOLIC BLOOD PRESSURE: 82 MMHG | WEIGHT: 284.8 LBS | BODY MASS INDEX: 47.39 KG/M2

## 2020-05-26 DIAGNOSIS — A60.09 GENITAL HERPES AFFECTING PREGNANCY IN THIRD TRIMESTER: ICD-10-CM

## 2020-05-26 DIAGNOSIS — O09.93 HIGH-RISK PREGNANCY IN THIRD TRIMESTER: ICD-10-CM

## 2020-05-26 DIAGNOSIS — Z3A.39 39 WEEKS GESTATION OF PREGNANCY: ICD-10-CM

## 2020-05-26 DIAGNOSIS — O09.299 PREVIOUS STILLBIRTH OR DEMISE, ANTEPARTUM: ICD-10-CM

## 2020-05-26 DIAGNOSIS — O99.213 OBESITY AFFECTING PREGNANCY IN THIRD TRIMESTER: ICD-10-CM

## 2020-05-26 DIAGNOSIS — O09.93 HIGH-RISK PREGNANCY IN THIRD TRIMESTER: Primary | ICD-10-CM

## 2020-05-26 DIAGNOSIS — O98.313 GENITAL HERPES AFFECTING PREGNANCY IN THIRD TRIMESTER: ICD-10-CM

## 2020-05-26 DIAGNOSIS — O99.213 OBESITY AFFECTING PREGNANCY IN THIRD TRIMESTER: Primary | ICD-10-CM

## 2020-05-26 LAB
GLUCOSE UR STRIP-MCNC: NEGATIVE MG/DL
PROT UR STRIP-MCNC: ABNORMAL MG/DL

## 2020-05-26 PROCEDURE — 76819 FETAL BIOPHYS PROFIL W/O NST: CPT | Performed by: OBSTETRICS & GYNECOLOGY

## 2020-05-26 PROCEDURE — 99213 OFFICE O/P EST LOW 20 MIN: CPT | Performed by: OBSTETRICS & GYNECOLOGY

## 2020-05-26 RX ORDER — SODIUM CHLORIDE 0.9 % (FLUSH) 0.9 %
3 SYRINGE (ML) INJECTION EVERY 12 HOURS SCHEDULED
Status: CANCELLED | OUTPATIENT
Start: 2020-05-26

## 2020-05-26 RX ORDER — SODIUM CHLORIDE, SODIUM LACTATE, POTASSIUM CHLORIDE, CALCIUM CHLORIDE 600; 310; 30; 20 MG/100ML; MG/100ML; MG/100ML; MG/100ML
125 INJECTION, SOLUTION INTRAVENOUS CONTINUOUS
Status: CANCELLED | OUTPATIENT
Start: 2020-05-26

## 2020-05-26 RX ORDER — ONDANSETRON 4 MG/1
4 TABLET, FILM COATED ORAL EVERY 6 HOURS PRN
Status: CANCELLED | OUTPATIENT
Start: 2020-05-26

## 2020-05-26 RX ORDER — SODIUM CHLORIDE 0.9 % (FLUSH) 0.9 %
10 SYRINGE (ML) INJECTION AS NEEDED
Status: CANCELLED | OUTPATIENT
Start: 2020-05-26

## 2020-05-26 RX ORDER — OXYTOCIN 10 [USP'U]/ML
999 INJECTION, SOLUTION INTRAMUSCULAR; INTRAVENOUS ONCE
Status: CANCELLED | OUTPATIENT
Start: 2020-05-26

## 2020-05-26 RX ORDER — MISOPROSTOL 100 UG/1
800 TABLET ORAL AS NEEDED
Status: CANCELLED | OUTPATIENT
Start: 2020-05-26

## 2020-05-26 RX ORDER — METHYLERGONOVINE MALEATE 0.2 MG/ML
200 INJECTION INTRAVENOUS ONCE AS NEEDED
Status: CANCELLED | OUTPATIENT
Start: 2020-05-26

## 2020-05-26 RX ORDER — OXYTOCIN 10 [USP'U]/ML
125 INJECTION, SOLUTION INTRAMUSCULAR; INTRAVENOUS CONTINUOUS PRN
Status: CANCELLED | OUTPATIENT
Start: 2020-05-26

## 2020-05-26 RX ORDER — OXYTOCIN 10 [USP'U]/ML
2-30 INJECTION, SOLUTION INTRAMUSCULAR; INTRAVENOUS
Status: CANCELLED | OUTPATIENT
Start: 2020-05-26

## 2020-05-26 RX ORDER — LIDOCAINE HYDROCHLORIDE 10 MG/ML
5 INJECTION, SOLUTION EPIDURAL; INFILTRATION; INTRACAUDAL; PERINEURAL AS NEEDED
Status: CANCELLED | OUTPATIENT
Start: 2020-05-26

## 2020-05-26 RX ORDER — ACETAMINOPHEN 325 MG/1
650 TABLET ORAL EVERY 4 HOURS PRN
Status: CANCELLED | OUTPATIENT
Start: 2020-05-26

## 2020-05-26 RX ORDER — ONDANSETRON 2 MG/ML
4 INJECTION INTRAMUSCULAR; INTRAVENOUS EVERY 6 HOURS PRN
Status: CANCELLED | OUTPATIENT
Start: 2020-05-26

## 2020-05-26 RX ORDER — OXYTOCIN 10 [USP'U]/ML
250 INJECTION, SOLUTION INTRAMUSCULAR; INTRAVENOUS CONTINUOUS
Status: CANCELLED | OUTPATIENT
Start: 2020-05-26 | End: 2020-05-26

## 2020-05-26 RX ORDER — CARBOPROST TROMETHAMINE 250 UG/ML
250 INJECTION, SOLUTION INTRAMUSCULAR AS NEEDED
Status: CANCELLED | OUTPATIENT
Start: 2020-05-26

## 2020-05-26 NOTE — PROGRESS NOTES
Chief Complaint   Patient presents with   • Routine Prenatal Visit     HPI- Pt is 25 y.o.  at 39w3d here for prenatal visit.  Patient complains of swollen feet.  She denies headache, vision changes, right upper quadrant pain.  She reports adequate fetal movement.    ROS-     - No vaginal bleeding    GI- No abdominal pain    /82   Wt 129 kg (284 lb 12.8 oz)   LMP 08/10/2019 (Approximate)   BMI 47.39 kg/m²   Exam - See flow sheet    Fetal heart rate is normal    Assessment-  Diagnoses and all orders for this visit:    High-risk pregnancy in third trimester    Genital herpes affecting pregnancy in third trimester    Previous stillbirth or demise, antepartum    Obesity affecting pregnancy in third trimester    39 weeks gestation of pregnancy    Other orders  -     POC Urinalysis Dipstick    Patient is scheduled for induction of labor in the morning.  Induction process was explained to her and patient was advised to call 2 hours before her scheduled induction.    Counseling was given to patient for the following topics: instructions for management and patient and family education . Total time of the encounter was 15 minutes and 10 minutes was spend counseling.

## 2020-05-27 ENCOUNTER — HOSPITAL ENCOUNTER (OUTPATIENT)
Dept: LABOR AND DELIVERY | Facility: HOSPITAL | Age: 25
Discharge: HOME | End: 2020-05-27

## 2020-05-27 ENCOUNTER — ANESTHESIA (OUTPATIENT)
Dept: LABOR AND DELIVERY | Facility: HOSPITAL | Age: 25
End: 2020-05-27

## 2020-05-27 ENCOUNTER — ANESTHESIA EVENT (OUTPATIENT)
Dept: LABOR AND DELIVERY | Facility: HOSPITAL | Age: 25
End: 2020-05-27

## 2020-05-27 ENCOUNTER — HOSPITAL ENCOUNTER (INPATIENT)
Facility: HOSPITAL | Age: 25
LOS: 2 days | Discharge: HOME | End: 2020-05-29
Attending: OBSTETRICS & GYNECOLOGY | Admitting: OBSTETRICS & GYNECOLOGY

## 2020-05-27 DIAGNOSIS — O09.299 PREVIOUS STILLBIRTH OR DEMISE, ANTEPARTUM: ICD-10-CM

## 2020-05-27 DIAGNOSIS — Z37.9 VACUUM-ASSISTED VAGINAL DELIVERY: Primary | ICD-10-CM

## 2020-05-27 LAB
ABO GROUP BLD: NORMAL
AMPHET+METHAMPHET UR QL: NEGATIVE
BARBITURATES UR QL SCN: NEGATIVE
BENZODIAZ UR QL SCN: NEGATIVE
BLD GP AB SCN SERPL QL: NEGATIVE
CANNABINOIDS SERPL QL: NEGATIVE
COCAINE UR QL: NEGATIVE
DEPRECATED RDW RBC AUTO: 45 FL (ref 37–54)
ERYTHROCYTE [DISTWIDTH] IN BLOOD BY AUTOMATED COUNT: 13.9 % (ref 12.3–15.4)
HCT VFR BLD AUTO: 33.6 % (ref 34–46.6)
HGB BLD-MCNC: 11 G/DL (ref 12–15.9)
MCH RBC QN AUTO: 28.6 PG (ref 26.6–33)
MCHC RBC AUTO-ENTMCNC: 32.7 G/DL (ref 31.5–35.7)
MCV RBC AUTO: 87.5 FL (ref 79–97)
METHADONE UR QL SCN: NEGATIVE
OPIATES UR QL: NEGATIVE
OXYCODONE UR QL SCN: NEGATIVE
PLATELET # BLD AUTO: 200 10*3/MM3 (ref 140–450)
PMV BLD AUTO: 11 FL (ref 6–12)
RBC # BLD AUTO: 3.84 10*6/MM3 (ref 3.77–5.28)
RH BLD: POSITIVE
SARS-COV-2 RNA RESP QL NAA+PROBE: NOT DETECTED
T&S EXPIRATION DATE: NORMAL
WBC NRBC COR # BLD: 9.2 10*3/MM3 (ref 3.4–10.8)

## 2020-05-27 PROCEDURE — 25010000002 PENICILLIN G POTASSIUM PER 600000 UNITS: Performed by: OBSTETRICS & GYNECOLOGY

## 2020-05-27 PROCEDURE — 80307 DRUG TEST PRSMV CHEM ANLYZR: CPT | Performed by: OBSTETRICS & GYNECOLOGY

## 2020-05-27 PROCEDURE — 86900 BLOOD TYPING SEROLOGIC ABO: CPT | Performed by: OBSTETRICS & GYNECOLOGY

## 2020-05-27 PROCEDURE — 87635 SARS-COV-2 COVID-19 AMP PRB: CPT | Performed by: OBSTETRICS & GYNECOLOGY

## 2020-05-27 PROCEDURE — 3E033VJ INTRODUCTION OF OTHER HORMONE INTO PERIPHERAL VEIN, PERCUTANEOUS APPROACH: ICD-10-PCS | Performed by: OBSTETRICS & GYNECOLOGY

## 2020-05-27 PROCEDURE — 86901 BLOOD TYPING SEROLOGIC RH(D): CPT | Performed by: OBSTETRICS & GYNECOLOGY

## 2020-05-27 PROCEDURE — 10907ZC DRAINAGE OF AMNIOTIC FLUID, THERAPEUTIC FROM PRODUCTS OF CONCEPTION, VIA NATURAL OR ARTIFICIAL OPENING: ICD-10-PCS | Performed by: OBSTETRICS & GYNECOLOGY

## 2020-05-27 PROCEDURE — C1755 CATHETER, INTRASPINAL: HCPCS | Performed by: ANESTHESIOLOGY

## 2020-05-27 PROCEDURE — 86850 RBC ANTIBODY SCREEN: CPT | Performed by: OBSTETRICS & GYNECOLOGY

## 2020-05-27 PROCEDURE — C1755 CATHETER, INTRASPINAL: HCPCS

## 2020-05-27 PROCEDURE — 85027 COMPLETE CBC AUTOMATED: CPT | Performed by: OBSTETRICS & GYNECOLOGY

## 2020-05-27 RX ORDER — ONDANSETRON 4 MG/1
4 TABLET, FILM COATED ORAL EVERY 6 HOURS PRN
Status: DISCONTINUED | OUTPATIENT
Start: 2020-05-27 | End: 2020-05-28 | Stop reason: HOSPADM

## 2020-05-27 RX ORDER — PHYTONADIONE 1 MG/.5ML
INJECTION, EMULSION INTRAMUSCULAR; INTRAVENOUS; SUBCUTANEOUS
Status: DISPENSED
Start: 2020-05-27 | End: 2020-05-28

## 2020-05-27 RX ORDER — PENICILLIN G 3000000 [IU]/50ML
3 INJECTION, SOLUTION INTRAVENOUS EVERY 4 HOURS
Status: DISCONTINUED | OUTPATIENT
Start: 2020-05-27 | End: 2020-05-28 | Stop reason: HOSPADM

## 2020-05-27 RX ORDER — OXYTOCIN-SODIUM CHLORIDE 0.9% IV SOLN 30 UNIT/500ML 30-0.9/5 UT/ML-%
999 SOLUTION INTRAVENOUS ONCE
Status: COMPLETED | OUTPATIENT
Start: 2020-05-27 | End: 2020-05-28

## 2020-05-27 RX ORDER — ONDANSETRON 2 MG/ML
4 INJECTION INTRAMUSCULAR; INTRAVENOUS EVERY 6 HOURS PRN
Status: DISCONTINUED | OUTPATIENT
Start: 2020-05-27 | End: 2020-05-28 | Stop reason: HOSPADM

## 2020-05-27 RX ORDER — SODIUM CHLORIDE, SODIUM LACTATE, POTASSIUM CHLORIDE, CALCIUM CHLORIDE 600; 310; 30; 20 MG/100ML; MG/100ML; MG/100ML; MG/100ML
125 INJECTION, SOLUTION INTRAVENOUS CONTINUOUS
Status: DISCONTINUED | OUTPATIENT
Start: 2020-05-27 | End: 2020-05-28

## 2020-05-27 RX ORDER — OXYTOCIN-SODIUM CHLORIDE 0.9% IV SOLN 30 UNIT/500ML 30-0.9/5 UT/ML-%
2-30 SOLUTION INTRAVENOUS
Status: DISCONTINUED | OUTPATIENT
Start: 2020-05-27 | End: 2020-05-28 | Stop reason: HOSPADM

## 2020-05-27 RX ORDER — OXYTOCIN-SODIUM CHLORIDE 0.9% IV SOLN 30 UNIT/500ML 30-0.9/5 UT/ML-%
250 SOLUTION INTRAVENOUS CONTINUOUS
Status: ACTIVE | OUTPATIENT
Start: 2020-05-27 | End: 2020-05-28

## 2020-05-27 RX ORDER — CARBOPROST TROMETHAMINE 250 UG/ML
250 INJECTION, SOLUTION INTRAMUSCULAR AS NEEDED
Status: DISCONTINUED | OUTPATIENT
Start: 2020-05-27 | End: 2020-05-28 | Stop reason: HOSPADM

## 2020-05-27 RX ORDER — SODIUM CHLORIDE 0.9 % (FLUSH) 0.9 %
10 SYRINGE (ML) INJECTION AS NEEDED
Status: DISCONTINUED | OUTPATIENT
Start: 2020-05-27 | End: 2020-05-28 | Stop reason: HOSPADM

## 2020-05-27 RX ORDER — EPHEDRINE SULFATE 50 MG/ML
10 INJECTION, SOLUTION INTRAVENOUS
Status: DISCONTINUED | OUTPATIENT
Start: 2020-05-27 | End: 2020-05-28 | Stop reason: HOSPADM

## 2020-05-27 RX ORDER — METHYLERGONOVINE MALEATE 0.2 MG/ML
200 INJECTION INTRAVENOUS ONCE AS NEEDED
Status: DISCONTINUED | OUTPATIENT
Start: 2020-05-27 | End: 2020-05-28 | Stop reason: HOSPADM

## 2020-05-27 RX ORDER — LIDOCAINE HYDROCHLORIDE 10 MG/ML
5 INJECTION, SOLUTION EPIDURAL; INFILTRATION; INTRACAUDAL; PERINEURAL AS NEEDED
Status: DISCONTINUED | OUTPATIENT
Start: 2020-05-27 | End: 2020-05-28 | Stop reason: HOSPADM

## 2020-05-27 RX ORDER — MISOPROSTOL 200 UG/1
800 TABLET ORAL AS NEEDED
Status: DISCONTINUED | OUTPATIENT
Start: 2020-05-27 | End: 2020-05-28 | Stop reason: HOSPADM

## 2020-05-27 RX ORDER — SODIUM CHLORIDE 0.9 % (FLUSH) 0.9 %
3 SYRINGE (ML) INJECTION EVERY 12 HOURS SCHEDULED
Status: DISCONTINUED | OUTPATIENT
Start: 2020-05-27 | End: 2020-05-28 | Stop reason: HOSPADM

## 2020-05-27 RX ORDER — OXYTOCIN-SODIUM CHLORIDE 0.9% IV SOLN 30 UNIT/500ML 30-0.9/5 UT/ML-%
125 SOLUTION INTRAVENOUS CONTINUOUS PRN
Status: DISCONTINUED | OUTPATIENT
Start: 2020-05-28 | End: 2020-05-28 | Stop reason: HOSPADM

## 2020-05-27 RX ORDER — ACETAMINOPHEN 325 MG/1
650 TABLET ORAL EVERY 4 HOURS PRN
Status: DISCONTINUED | OUTPATIENT
Start: 2020-05-27 | End: 2020-05-28 | Stop reason: HOSPADM

## 2020-05-27 RX ORDER — LIDOCAINE HYDROCHLORIDE AND EPINEPHRINE 15; 5 MG/ML; UG/ML
INJECTION, SOLUTION EPIDURAL AS NEEDED
Status: DISCONTINUED | OUTPATIENT
Start: 2020-05-27 | End: 2020-05-28 | Stop reason: SURG

## 2020-05-27 RX ORDER — ERYTHROMYCIN 5 MG/G
OINTMENT OPHTHALMIC
Status: DISPENSED
Start: 2020-05-27 | End: 2020-05-28

## 2020-05-27 RX ADMIN — SODIUM CHLORIDE, POTASSIUM CHLORIDE, SODIUM LACTATE AND CALCIUM CHLORIDE 125 ML/HR: 600; 310; 30; 20 INJECTION, SOLUTION INTRAVENOUS at 08:00

## 2020-05-27 RX ADMIN — SODIUM CHLORIDE 300 ML: 9 INJECTION, SOLUTION INTRAVENOUS at 14:13

## 2020-05-27 RX ADMIN — PENICILLIN G 3 MILLION UNITS: 3000000 INJECTION, SOLUTION INTRAVENOUS at 16:22

## 2020-05-27 RX ADMIN — SODIUM CHLORIDE 5 MILLION UNITS: 900 INJECTION INTRAVENOUS at 08:27

## 2020-05-27 RX ADMIN — EPHEDRINE SULFATE 10 MG: 50 INJECTION INTRAVENOUS at 16:20

## 2020-05-27 RX ADMIN — PENICILLIN G 3 MILLION UNITS: 3000000 INJECTION, SOLUTION INTRAVENOUS at 20:24

## 2020-05-27 RX ADMIN — LIDOCAINE HYDROCHLORIDE AND EPINEPHRINE 5 ML: 15; 5 INJECTION, SOLUTION EPIDURAL at 15:54

## 2020-05-27 RX ADMIN — PENICILLIN G 3 MILLION UNITS: 3000000 INJECTION, SOLUTION INTRAVENOUS at 12:18

## 2020-05-27 RX ADMIN — SODIUM CHLORIDE, POTASSIUM CHLORIDE, SODIUM LACTATE AND CALCIUM CHLORIDE 1000 ML: 600; 310; 30; 20 INJECTION, SOLUTION INTRAVENOUS at 15:30

## 2020-05-27 RX ADMIN — OXYTOCIN 2 MILLI-UNITS/MIN: 10 INJECTION INTRAVENOUS at 08:28

## 2020-05-27 RX ADMIN — EPHEDRINE SULFATE 10 MG: 50 INJECTION INTRAVENOUS at 21:33

## 2020-05-27 RX ADMIN — EPHEDRINE SULFATE 10 MG: 50 INJECTION INTRAVENOUS at 18:08

## 2020-05-27 RX ADMIN — SODIUM CHLORIDE, POTASSIUM CHLORIDE, SODIUM LACTATE AND CALCIUM CHLORIDE 999 ML/HR: 600; 310; 30; 20 INJECTION, SOLUTION INTRAVENOUS at 18:08

## 2020-05-27 RX ADMIN — Medication 8 ML/HR: at 16:00

## 2020-05-27 NOTE — ANESTHESIA PROCEDURE NOTES
Labor Epidural      Patient reassessed immediately prior to procedure    Patient location during procedure: OB  Start Time: 5/27/2020 3:41 PM  Stop Time: 5/27/2020 4:00 PM  Performed By  Anesthesiologist: Gwyn Padgett MD  Preanesthetic Checklist  Completed: patient identified, site marked, surgical consent, pre-op evaluation, timeout performed, IV checked, risks and benefits discussed and monitors and equipment checked  Prep:  Pt Position:sitting  Sterile Tech:cap, gloves, mask and sterile barrier  Prep:povidone-iodine 7.5% surgical scrub  Monitoring:blood pressure monitoring, continuous pulse oximetry and EKG  Epidural Block Procedure:  Approach:midline  Guidance:landmark technique and palpation technique  Location:L4-L5  Needle Type:Tuohy  Needle Gauge:17  Loss of Resistance Medium: air  Paresthesia: none  Aspiration:negative  Test Dose:negative  Number of Attempts: 1  Post Assessment:  Dressing:occlusive dressing applied and secured with tape  Pt Tolerance:patient tolerated the procedure well with no apparent complications  Complications:no

## 2020-05-27 NOTE — ANESTHESIA PREPROCEDURE EVALUATION
Anesthesia Evaluation     Patient summary reviewed and Nursing notes reviewed   NPO Solid Status: > 8 hours  NPO Liquid Status: > 4 hours           Airway   Mallampati: II  No difficulty expected  Dental - normal exam     Pulmonary     breath sounds clear to auscultation  (+) asthma,  Cardiovascular     Rhythm: regular        Neuro/Psych  (+) headaches, psychiatric history Depression,     GI/Hepatic/Renal/Endo    (+) obesity, morbid obesity,      Musculoskeletal     Abdominal   (+) obese,    Substance History      OB/GYN    (+) Pregnant,         Other                        Anesthesia Plan    ASA 3     epidural   (  39w4d  )    Anesthetic plan, all risks, benefits, and alternatives have been provided, discussed and informed consent has been obtained with: patient.

## 2020-05-28 PROBLEM — Z37.9 VACUUM-ASSISTED VAGINAL DELIVERY: Status: ACTIVE | Noted: 2020-05-28

## 2020-05-28 LAB — BUPRENORPHINE UR QL: NEGATIVE NG/ML

## 2020-05-28 PROCEDURE — 88307 TISSUE EXAM BY PATHOLOGIST: CPT

## 2020-05-28 PROCEDURE — 25010000002 MEASLES, MUMPS & RUBELLA VAC RECONSTITUTED SOLUTION: Performed by: OBSTETRICS & GYNECOLOGY

## 2020-05-28 PROCEDURE — 90707 MMR VACCINE SC: CPT | Performed by: OBSTETRICS & GYNECOLOGY

## 2020-05-28 PROCEDURE — 90471 IMMUNIZATION ADMIN: CPT | Performed by: OBSTETRICS & GYNECOLOGY

## 2020-05-28 PROCEDURE — 99024 POSTOP FOLLOW-UP VISIT: CPT | Performed by: OBSTETRICS & GYNECOLOGY

## 2020-05-28 PROCEDURE — 59410 OBSTETRICAL CARE: CPT | Performed by: OBSTETRICS & GYNECOLOGY

## 2020-05-28 RX ORDER — IBUPROFEN 600 MG/1
600 TABLET ORAL EVERY 8 HOURS PRN
Status: DISCONTINUED | OUTPATIENT
Start: 2020-05-28 | End: 2020-05-29 | Stop reason: HOSPADM

## 2020-05-28 RX ORDER — OXYCODONE HYDROCHLORIDE AND ACETAMINOPHEN 5; 325 MG/1; MG/1
1 TABLET ORAL EVERY 4 HOURS PRN
Status: DISCONTINUED | OUTPATIENT
Start: 2020-05-28 | End: 2020-05-29 | Stop reason: HOSPADM

## 2020-05-28 RX ORDER — DOCUSATE SODIUM 100 MG/1
100 CAPSULE, LIQUID FILLED ORAL 2 TIMES DAILY
Status: DISCONTINUED | OUTPATIENT
Start: 2020-05-28 | End: 2020-05-29 | Stop reason: HOSPADM

## 2020-05-28 RX ORDER — ONDANSETRON 4 MG/1
4 TABLET, FILM COATED ORAL EVERY 6 HOURS PRN
Status: DISCONTINUED | OUTPATIENT
Start: 2020-05-28 | End: 2020-05-29 | Stop reason: HOSPADM

## 2020-05-28 RX ORDER — LANOLIN
CREAM (ML) TOPICAL AS NEEDED
Status: DISCONTINUED | OUTPATIENT
Start: 2020-05-28 | End: 2020-05-29 | Stop reason: HOSPADM

## 2020-05-28 RX ORDER — BISACODYL 10 MG
10 SUPPOSITORY, RECTAL RECTAL DAILY PRN
Status: DISCONTINUED | OUTPATIENT
Start: 2020-05-29 | End: 2020-05-29 | Stop reason: HOSPADM

## 2020-05-28 RX ORDER — PROMETHAZINE HYDROCHLORIDE 25 MG/1
12.5 SUPPOSITORY RECTAL EVERY 6 HOURS PRN
Status: DISCONTINUED | OUTPATIENT
Start: 2020-05-28 | End: 2020-05-29 | Stop reason: HOSPADM

## 2020-05-28 RX ORDER — ONDANSETRON 2 MG/ML
4 INJECTION INTRAMUSCULAR; INTRAVENOUS EVERY 6 HOURS PRN
Status: DISCONTINUED | OUTPATIENT
Start: 2020-05-28 | End: 2020-05-29 | Stop reason: HOSPADM

## 2020-05-28 RX ORDER — SODIUM CHLORIDE 0.9 % (FLUSH) 0.9 %
1-10 SYRINGE (ML) INJECTION AS NEEDED
Status: DISCONTINUED | OUTPATIENT
Start: 2020-05-28 | End: 2020-05-29 | Stop reason: HOSPADM

## 2020-05-28 RX ORDER — PRENATAL VIT NO.126/IRON/FOLIC 28MG-0.8MG
1 TABLET ORAL DAILY
Status: DISCONTINUED | OUTPATIENT
Start: 2020-05-28 | End: 2020-05-29 | Stop reason: HOSPADM

## 2020-05-28 RX ORDER — HYDROCORTISONE 25 MG/G
1 CREAM TOPICAL AS NEEDED
Status: DISCONTINUED | OUTPATIENT
Start: 2020-05-28 | End: 2020-05-29 | Stop reason: HOSPADM

## 2020-05-28 RX ORDER — PROMETHAZINE HYDROCHLORIDE 25 MG/ML
12.5 INJECTION, SOLUTION INTRAMUSCULAR; INTRAVENOUS EVERY 6 HOURS PRN
Status: DISCONTINUED | OUTPATIENT
Start: 2020-05-28 | End: 2020-05-29 | Stop reason: HOSPADM

## 2020-05-28 RX ORDER — PROMETHAZINE HYDROCHLORIDE 25 MG/1
25 TABLET ORAL EVERY 6 HOURS PRN
Status: DISCONTINUED | OUTPATIENT
Start: 2020-05-28 | End: 2020-05-29 | Stop reason: HOSPADM

## 2020-05-28 RX ADMIN — IBUPROFEN 600 MG: 600 TABLET, FILM COATED ORAL at 23:00

## 2020-05-28 RX ADMIN — OXYTOCIN 999 ML/HR: 10 INJECTION INTRAVENOUS at 01:01

## 2020-05-28 RX ADMIN — OXYTOCIN 125 ML/HR: 10 INJECTION INTRAVENOUS at 02:18

## 2020-05-28 RX ADMIN — OXYCODONE HYDROCHLORIDE AND ACETAMINOPHEN 1 TABLET: 5; 325 TABLET ORAL at 09:35

## 2020-05-28 RX ADMIN — DOCUSATE SODIUM 100 MG: 100 CAPSULE, LIQUID FILLED ORAL at 21:33

## 2020-05-28 RX ADMIN — Medication 1 TABLET: at 09:06

## 2020-05-28 RX ADMIN — IBUPROFEN 600 MG: 600 TABLET, FILM COATED ORAL at 13:59

## 2020-05-28 RX ADMIN — Medication: at 09:35

## 2020-05-28 RX ADMIN — OXYCODONE HYDROCHLORIDE AND ACETAMINOPHEN 1 TABLET: 5; 325 TABLET ORAL at 05:29

## 2020-05-28 RX ADMIN — OXYCODONE HYDROCHLORIDE AND ACETAMINOPHEN 1 TABLET: 5; 325 TABLET ORAL at 13:59

## 2020-05-28 RX ADMIN — DOCUSATE SODIUM 100 MG: 100 CAPSULE, LIQUID FILLED ORAL at 09:06

## 2020-05-28 RX ADMIN — IBUPROFEN 600 MG: 600 TABLET, FILM COATED ORAL at 01:47

## 2020-05-28 RX ADMIN — Medication: at 05:29

## 2020-05-28 RX ADMIN — OXYCODONE HYDROCHLORIDE AND ACETAMINOPHEN 1 TABLET: 5; 325 TABLET ORAL at 23:00

## 2020-05-28 RX ADMIN — MEASLES, MUMPS, AND RUBELLA VIRUS VACCINE LIVE 0.5 ML: 1000; 12500; 1000 INJECTION, POWDER, LYOPHILIZED, FOR SUSPENSION SUBCUTANEOUS at 14:28

## 2020-05-28 RX ADMIN — OXYCODONE HYDROCHLORIDE AND ACETAMINOPHEN 1 TABLET: 5; 325 TABLET ORAL at 18:17

## 2020-05-29 VITALS
SYSTOLIC BLOOD PRESSURE: 95 MMHG | BODY MASS INDEX: 45.64 KG/M2 | HEART RATE: 62 BPM | OXYGEN SATURATION: 92 % | RESPIRATION RATE: 18 BRPM | TEMPERATURE: 98 F | HEIGHT: 66 IN | DIASTOLIC BLOOD PRESSURE: 64 MMHG | WEIGHT: 284 LBS

## 2020-05-29 LAB
BASOPHILS # BLD AUTO: 0.02 10*3/MM3 (ref 0–0.2)
BASOPHILS NFR BLD AUTO: 0.2 % (ref 0–1.5)
DEPRECATED RDW RBC AUTO: 42.6 FL (ref 37–54)
EOSINOPHIL # BLD AUTO: 0.24 10*3/MM3 (ref 0–0.4)
EOSINOPHIL NFR BLD AUTO: 2.6 % (ref 0.3–6.2)
ERYTHROCYTE [DISTWIDTH] IN BLOOD BY AUTOMATED COUNT: 13.5 % (ref 12.3–15.4)
HCT VFR BLD AUTO: 32.5 % (ref 34–46.6)
HGB BLD-MCNC: 10.9 G/DL (ref 12–15.9)
IMM GRANULOCYTES # BLD AUTO: 0.04 10*3/MM3 (ref 0–0.05)
IMM GRANULOCYTES NFR BLD AUTO: 0.4 % (ref 0–0.5)
LYMPHOCYTES # BLD AUTO: 3.52 10*3/MM3 (ref 0.7–3.1)
LYMPHOCYTES NFR BLD AUTO: 38.7 % (ref 19.6–45.3)
MCH RBC QN AUTO: 28.6 PG (ref 26.6–33)
MCHC RBC AUTO-ENTMCNC: 33.5 G/DL (ref 31.5–35.7)
MCV RBC AUTO: 85.3 FL (ref 79–97)
MONOCYTES # BLD AUTO: 0.56 10*3/MM3 (ref 0.1–0.9)
MONOCYTES NFR BLD AUTO: 6.2 % (ref 5–12)
NEUTROPHILS # BLD AUTO: 4.72 10*3/MM3 (ref 1.7–7)
NEUTROPHILS NFR BLD AUTO: 51.9 % (ref 42.7–76)
NRBC BLD AUTO-RTO: 0 /100 WBC (ref 0–0.2)
PLATELET # BLD AUTO: 197 10*3/MM3 (ref 140–450)
PMV BLD AUTO: 10.6 FL (ref 6–12)
RBC # BLD AUTO: 3.81 10*6/MM3 (ref 3.77–5.28)
WBC NRBC COR # BLD: 9.1 10*3/MM3 (ref 3.4–10.8)

## 2020-05-29 PROCEDURE — 99024 POSTOP FOLLOW-UP VISIT: CPT | Performed by: OBSTETRICS & GYNECOLOGY

## 2020-05-29 PROCEDURE — 85025 COMPLETE CBC W/AUTO DIFF WBC: CPT | Performed by: OBSTETRICS & GYNECOLOGY

## 2020-05-29 RX ORDER — OXYCODONE HYDROCHLORIDE AND ACETAMINOPHEN 5; 325 MG/1; MG/1
1 TABLET ORAL EVERY 4 HOURS PRN
Qty: 12 TABLET | Refills: 0 | Status: SHIPPED | OUTPATIENT
Start: 2020-05-29 | End: 2020-06-07

## 2020-05-29 RX ORDER — IBUPROFEN 600 MG/1
600 TABLET ORAL EVERY 8 HOURS PRN
Qty: 30 TABLET | Refills: 1 | Status: SHIPPED | OUTPATIENT
Start: 2020-05-29 | End: 2021-10-11

## 2020-05-29 RX ADMIN — Medication: at 15:05

## 2020-05-29 RX ADMIN — Medication 1 TABLET: at 09:04

## 2020-05-29 RX ADMIN — IBUPROFEN 600 MG: 600 TABLET, FILM COATED ORAL at 09:04

## 2020-05-29 RX ADMIN — OXYCODONE HYDROCHLORIDE AND ACETAMINOPHEN 1 TABLET: 5; 325 TABLET ORAL at 09:04

## 2020-05-29 RX ADMIN — DOCUSATE SODIUM 100 MG: 100 CAPSULE, LIQUID FILLED ORAL at 09:04

## 2020-05-29 RX ADMIN — OXYCODONE HYDROCHLORIDE AND ACETAMINOPHEN 1 TABLET: 5; 325 TABLET ORAL at 04:26

## 2020-06-03 ENCOUNTER — TELEPHONE (OUTPATIENT)
Dept: OBSTETRICS AND GYNECOLOGY | Facility: CLINIC | Age: 25
End: 2020-06-03

## 2020-06-03 NOTE — TELEPHONE ENCOUNTER
Pt stated she was having very severe headaches and the pain was anytime she stood up or sat down. Also said was still very swollen, stated she was told to take BP but never stated that she took it or what the reading was. Told her she really needed to go to ER to be evaluated, pt sounded hesitant I stressed to her a few times the importance of going to ER. Pt stated understanding. SM

## 2020-06-03 NOTE — TELEPHONE ENCOUNTER
Bethany HEMPHILL pt called to report having a sharp pain that runs up her spine and causing migraines.  Has experienced this ever since delivery on 5/27/20.  Also c/o feet and ankles swelling since before delivery, but it still swollen.  Pt very concerned, please advise.     Pt # [phone]

## 2020-06-03 NOTE — TELEPHONE ENCOUNTER
See if patient is having worse headaches when she is up which may be due to a spinal headache.  If so I would send her to the emergency room at [facility] to get evaluated.  LONG

## 2020-06-03 NOTE — TELEPHONE ENCOUNTER
Headaches last all day,  Believes that it is blood pressure related, says that her feet are so swollen she has a hard time wearing shoes.  Patient is heading over to ER.

## 2020-06-03 NOTE — PROGRESS NOTES
Continued Stay Note  [facility]     Patient Name: Krishna Clarke  MRN: [id number]  Today's Date: 6/3/2020    Admit Date: 5/27/2020    Discharge Plan     Row Name 06/03/20 0909       Plan    Plan Comments  Mother: Krishna Clarke, MRN: [id number]. Infant: Hillary Clarke, MRN: [id number]. CSW followed up on cord toxicology results which were negative. Referral to CPS not warranted at this time. Haydee Samuels CSW.        Discharge Codes    No documentation.       Expected Discharge Date and Time     Expected Discharge Date Expected Discharge Time    May 29, 2020             Haydee Samuels

## 2020-06-19 ENCOUNTER — TELEPHONE (OUTPATIENT)
Dept: OBSTETRICS AND GYNECOLOGY | Facility: CLINIC | Age: 25
End: 2020-06-19

## 2020-06-19 RX ORDER — PNV NO.95/FERROUS FUM/FOLIC AC 28MG-0.8MG
1 TABLET ORAL DAILY
Qty: 30 TABLET | Refills: 12 | Status: SHIPPED | OUTPATIENT
Start: 2020-06-19 | End: 2021-07-28

## 2020-06-19 NOTE — TELEPHONE ENCOUNTER
angel would like to know if she can get a refill of her prenatals sent to her pharmacy, says she still takes these due to breast feeding.

## 2020-06-23 ENCOUNTER — OFFICE VISIT (OUTPATIENT)
Dept: OBSTETRICS AND GYNECOLOGY | Facility: CLINIC | Age: 25
End: 2020-06-23

## 2020-06-23 ENCOUNTER — TELEPHONE (OUTPATIENT)
Dept: OBSTETRICS AND GYNECOLOGY | Facility: CLINIC | Age: 25
End: 2020-06-23

## 2020-06-23 VITALS
BODY MASS INDEX: 46.15 KG/M2 | WEIGHT: 281.6 LBS | SYSTOLIC BLOOD PRESSURE: 100 MMHG | DIASTOLIC BLOOD PRESSURE: 69 MMHG | HEART RATE: 79 BPM

## 2020-06-23 DIAGNOSIS — L73.9 INFLAMED HAIR FOLLICLE: Primary | ICD-10-CM

## 2020-06-23 PROBLEM — O09.93 HIGH-RISK PREGNANCY IN THIRD TRIMESTER: Status: RESOLVED | Noted: 2019-10-25 | Resolved: 2020-06-23

## 2020-06-23 PROBLEM — O99.210 OBESITY COMPLICATING PREGNANCY: Status: RESOLVED | Noted: 2017-06-23 | Resolved: 2020-06-23

## 2020-06-23 PROBLEM — O09.299 PREVIOUS STILLBIRTH OR DEMISE, ANTEPARTUM: Status: RESOLVED | Noted: 2017-06-23 | Resolved: 2020-06-23

## 2020-06-23 PROBLEM — R00.2 PALPITATIONS: Status: RESOLVED | Noted: 2019-10-24 | Resolved: 2020-06-23

## 2020-06-23 PROBLEM — O98.313 GENITAL HERPES AFFECTING PREGNANCY IN THIRD TRIMESTER: Status: RESOLVED | Noted: 2017-06-23 | Resolved: 2020-06-23

## 2020-06-23 PROBLEM — A60.09 GENITAL HERPES AFFECTING PREGNANCY IN THIRD TRIMESTER: Status: RESOLVED | Noted: 2017-06-23 | Resolved: 2020-06-23

## 2020-06-23 PROBLEM — B95.1 POSITIVE GBS TEST: Status: RESOLVED | Noted: 2020-05-01 | Resolved: 2020-06-23

## 2020-06-23 PROCEDURE — 99213 OFFICE O/P EST LOW 20 MIN: CPT | Performed by: OBSTETRICS & GYNECOLOGY

## 2020-06-23 NOTE — PROGRESS NOTES
Subjective   Krishna Clarke is a 25 y.o. female here for bump on vaginal lip.  Pt states she noticed it 2 days ago.    History of Present Illness   Patient presents today complaining of a left vulvar lump that she first noticed 2 days ago.  She denies that it has increased in size or changed.  She does report that it is tender to touch.  She denies any fevers or chills.  She reports she has been shaving her vulva.  She is approximately 3 weeks status post vacuum-assisted vaginal delivery.  She reports some light vaginal bleeding and she is breast-feeding.    The following portions of the patient's history were reviewed and updated as appropriate: allergies, current medications, past family history, past medical history, past social history, past surgical history and problem list.    Review of Systems   Genitourinary: Positive for genital sores. Negative for menstrual problem and pelvic pain.   All other systems reviewed and are negative.      Objective   Physical Exam   Constitutional: She appears well-developed and well-nourished.   Genitourinary:         Neurological: She is alert.   Psychiatric: She has a normal mood and affect.   Vitals reviewed.        Assessment/Plan   Krishna was seen today for mass.    Diagnoses and all orders for this visit:    Inflamed hair follicle    Patient is noted to have an inflamed hair follicle.  She was advised to do warm compresses at the area for at least 10 minutes 3 times a day.  There are no signs of overlying cellulitis.  Patient was advised to avoid shaving the area until it has completely healed and discussed appropriate perineal hygiene.  She is scheduled in 3 weeks for postpartum visit.  She was advised to call sooner if the area is not improving.

## 2020-06-23 NOTE — TELEPHONE ENCOUNTER
"Krishna Vince called this morning and said that she has developed a \"boil\" in her vaginal area that is very painful.    Any recommendations.  "

## 2020-06-26 ENCOUNTER — TELEPHONE (OUTPATIENT)
Dept: OBSTETRICS AND GYNECOLOGY | Facility: CLINIC | Age: 25
End: 2020-06-26

## 2020-06-26 NOTE — TELEPHONE ENCOUNTER
It will usually come to a head before draining or spontaneously resolving.  I would recommend that she continue with the warm compresses

## 2020-06-26 NOTE — TELEPHONE ENCOUNTER
Pt called to report the boil on her labia has come to a head.  You advised her to call if/when that happens. Pt is very uncormfortable.  Please advise.     Pt # [phone]

## 2020-06-30 ENCOUNTER — TELEPHONE (OUTPATIENT)
Dept: OBSTETRICS AND GYNECOLOGY | Facility: CLINIC | Age: 25
End: 2020-06-30

## 2020-06-30 RX ORDER — ACYCLOVIR 400 MG/1
400 TABLET ORAL 3 TIMES DAILY
Qty: 21 TABLET | Refills: 0 | Status: SHIPPED | OUTPATIENT
Start: 2020-06-30 | End: 2020-07-07

## 2020-07-28 ENCOUNTER — TELEPHONE (OUTPATIENT)
Dept: OBSTETRICS AND GYNECOLOGY | Facility: CLINIC | Age: 25
End: 2020-07-28

## 2020-08-26 ENCOUNTER — TELEPHONE (OUTPATIENT)
Dept: OBSTETRICS AND GYNECOLOGY | Facility: CLINIC | Age: 25
End: 2020-08-26

## 2021-03-11 ENCOUNTER — TELEPHONE (OUTPATIENT)
Dept: OBSTETRICS AND GYNECOLOGY | Facility: CLINIC | Age: 26
End: 2021-03-11

## 2021-03-11 NOTE — TELEPHONE ENCOUNTER
Good afternoon,  Patient called and was wondering if she could get another refill of acyclovir (ZOVIRAX) 400 MG tablet [[id number]] (Order [id number])    She tried to get into her pcp and they were booked out.   Please Advise,  Thanks Danielle

## 2021-03-12 RX ORDER — ACYCLOVIR 400 MG/1
400 TABLET ORAL 2 TIMES DAILY
Qty: 90 TABLET | Refills: 0 | Status: SHIPPED | OUTPATIENT
Start: 2021-03-12 | End: 2022-03-11

## 2021-03-26 ENCOUNTER — TELEPHONE (OUTPATIENT)
Dept: OBSTETRICS AND GYNECOLOGY | Facility: CLINIC | Age: 26
End: 2021-03-26

## 2021-04-16 ENCOUNTER — BULK ORDERING (OUTPATIENT)
Dept: CASE MANAGEMENT | Facility: OTHER | Age: 26
End: 2021-04-16

## 2021-04-16 DIAGNOSIS — Z23 IMMUNIZATION DUE: ICD-10-CM

## 2021-07-28 ENCOUNTER — INITIAL PRENATAL (OUTPATIENT)
Dept: OBSTETRICS AND GYNECOLOGY | Facility: CLINIC | Age: 26
End: 2021-07-28

## 2021-07-28 VITALS — WEIGHT: 266.4 LBS | DIASTOLIC BLOOD PRESSURE: 77 MMHG | BODY MASS INDEX: 43.66 KG/M2 | SYSTOLIC BLOOD PRESSURE: 116 MMHG

## 2021-07-28 DIAGNOSIS — Z34.90 EARLY STAGE OF PREGNANCY: Primary | ICD-10-CM

## 2021-07-28 PROBLEM — Z37.9 VACUUM-ASSISTED VAGINAL DELIVERY: Status: RESOLVED | Noted: 2020-05-28 | Resolved: 2021-07-28

## 2021-07-28 PROBLEM — Z86.19 HISTORY OF HERPES GENITALIS: Status: ACTIVE | Noted: 2021-07-28

## 2021-07-28 PROCEDURE — 99214 OFFICE O/P EST MOD 30 MIN: CPT | Performed by: OBSTETRICS & GYNECOLOGY

## 2021-07-28 RX ORDER — PRENATAL WITH FERROUS FUM AND FOLIC ACID 3080; 920; 120; 400; 22; 1.84; 3; 20; 10; 1; 12; 200; 27; 25; 2 [IU]/1; [IU]/1; MG/1; [IU]/1; MG/1; MG/1; MG/1; MG/1; MG/1; MG/1; UG/1; MG/1; MG/1; MG/1; MG/1
1 TABLET ORAL DAILY
Qty: 30 TABLET | Refills: 11 | Status: SHIPPED | OUTPATIENT
Start: 2021-07-28 | End: 2021-08-27

## 2021-07-28 NOTE — PROGRESS NOTES
Chief Complaint   Patient presents with   • Initial Prenatal Visit     HPI- Pt is 26 y.o.  at 4w2d here for prenatal visit.  Patient presents for initial OB visit.  She states she is sure regarding her LMP.  She has no major complaints today.  She does report some nausea, but currently declines any medication.  She has 4 living children and had 4 vaginal deliveries at term and had no major complications with any of those pregnancies.  Her third delivery was a stillborn at 27 weeks.    ROS-     - No vaginal bleeding    GI- No abdominal pain    /77   Wt 121 kg (266 lb 6.4 oz)   LMP 2021 (Exact Date)   BMI 43.66 kg/m²   Exam - See flow sheet    Fetal heart rate is normal    Assessment-  Diagnoses and all orders for this visit:    Early stage of pregnancy  -     OB Panel With HIV  -     Urine Culture - Urine, Urine, Clean Catch  -     Drug Profile Urine - 9 Drugs - Urine, Clean Catch  -     US Ob Transvaginal; Future  -     IGP,CtNgTv,rfx Aptima HPV ASCU    Other orders  -     Prenatal Vit-Fe Fumarate-FA (Prenatal ) 27-1 MG tablet tablet; Take 1 tablet by mouth Daily for 30 days.    Initial OB counseling was done.  OB labs were ordered and prescription was sent for prenatal vitamins.  She will follow-up in 2 weeks for viability ultrasound and follow-up.

## 2021-07-29 LAB
ABO GROUP BLD: NORMAL
AMPHETAMINES UR QL SCN: NEGATIVE NG/ML
BARBITURATES UR QL SCN: NEGATIVE NG/ML
BASOPHILS # BLD AUTO: 0 X10E3/UL (ref 0–0.2)
BASOPHILS NFR BLD AUTO: 0 %
BENZODIAZ UR QL: NEGATIVE NG/ML
BLD GP AB SCN SERPL QL: NEGATIVE
BZE UR QL: NEGATIVE NG/ML
CANNABINOIDS UR QL SCN: NEGATIVE NG/ML
EOSINOPHIL # BLD AUTO: 0.1 X10E3/UL (ref 0–0.4)
EOSINOPHIL NFR BLD AUTO: 2 %
ERYTHROCYTE [DISTWIDTH] IN BLOOD BY AUTOMATED COUNT: 13.1 % (ref 11.7–15.4)
HBV SURFACE AG SERPL QL IA: NEGATIVE
HCT VFR BLD AUTO: 35.3 % (ref 34–46.6)
HCV AB S/CO SERPL IA: <0.1 S/CO RATIO (ref 0–0.9)
HGB BLD-MCNC: 11.7 G/DL (ref 11.1–15.9)
HIV 1+2 AB+HIV1 P24 AG SERPL QL IA: NON REACTIVE
IMM GRANULOCYTES # BLD AUTO: 0 X10E3/UL (ref 0–0.1)
IMM GRANULOCYTES NFR BLD AUTO: 0 %
LYMPHOCYTES # BLD AUTO: 2.9 X10E3/UL (ref 0.7–3.1)
LYMPHOCYTES NFR BLD AUTO: 40 %
MCH RBC QN AUTO: 29.7 PG (ref 26.6–33)
MCHC RBC AUTO-ENTMCNC: 33.1 G/DL (ref 31.5–35.7)
MCV RBC AUTO: 90 FL (ref 79–97)
METHADONE UR QL SCN: NEGATIVE NG/ML
MONOCYTES # BLD AUTO: 0.5 X10E3/UL (ref 0.1–0.9)
MONOCYTES NFR BLD AUTO: 7 %
NEUTROPHILS # BLD AUTO: 3.6 X10E3/UL (ref 1.4–7)
NEUTROPHILS NFR BLD AUTO: 51 %
OPIATES UR QL: NEGATIVE NG/ML
PCP UR QL: NEGATIVE NG/ML
PLATELET # BLD AUTO: 261 X10E3/UL (ref 150–450)
PROPOXYPH UR QL SCN: NEGATIVE NG/ML
RBC # BLD AUTO: 3.94 X10E6/UL (ref 3.77–5.28)
RH BLD: POSITIVE
RPR SER QL: NON REACTIVE
RUBV IGG SERPL IA-ACNC: 3.46 INDEX
WBC # BLD AUTO: 7.2 X10E3/UL (ref 3.4–10.8)

## 2021-07-30 LAB
BACTERIA UR CULT: NORMAL
BACTERIA UR CULT: NORMAL
C TRACH RRNA CVX QL NAA+PROBE: NEGATIVE
CONV .: NORMAL
CYTOLOGIST CVX/VAG CYTO: NORMAL
CYTOLOGY CVX/VAG DOC CYTO: NORMAL
CYTOLOGY CVX/VAG DOC THIN PREP: NORMAL
DX ICD CODE: NORMAL
HIV 1 & 2 AB SER-IMP: NORMAL
N GONORRHOEA RRNA CVX QL NAA+PROBE: NEGATIVE
OTHER STN SPEC: NORMAL
STAT OF ADQ CVX/VAG CYTO-IMP: NORMAL
T VAGINALIS RRNA SPEC QL NAA+PROBE: NEGATIVE

## 2021-08-11 ENCOUNTER — ROUTINE PRENATAL (OUTPATIENT)
Dept: OBSTETRICS AND GYNECOLOGY | Facility: CLINIC | Age: 26
End: 2021-08-11

## 2021-08-11 VITALS — WEIGHT: 265.4 LBS | DIASTOLIC BLOOD PRESSURE: 65 MMHG | BODY MASS INDEX: 43.49 KG/M2 | SYSTOLIC BLOOD PRESSURE: 97 MMHG

## 2021-08-11 DIAGNOSIS — Z3A.01 6 WEEKS GESTATION OF PREGNANCY: Primary | ICD-10-CM

## 2021-08-11 DIAGNOSIS — F32.89 OTHER DEPRESSION: ICD-10-CM

## 2021-08-11 LAB
GLUCOSE UR STRIP-MCNC: NEGATIVE MG/DL
PROT UR STRIP-MCNC: NEGATIVE MG/DL

## 2021-08-11 PROCEDURE — 99213 OFFICE O/P EST LOW 20 MIN: CPT | Performed by: OBSTETRICS & GYNECOLOGY

## 2021-08-11 NOTE — PROGRESS NOTES
Chief Complaint   Patient presents with   • Routine Prenatal Visit     HPI- Pt is 26 y.o.  at 6w2d here for prenatal visit.  Patient reports recent depression and is unsure if she plans on keeping the pregnancy.  She reports that her relationship with the father of the baby has worsened and she is currently a single mother to 4 children.    ROS-     - No vaginal bleeding    GI- No abdominal pain    BP 97/65   Wt 120 kg (265 lb 6.4 oz)   LMP 2021 (Exact Date)   BMI 43.49 kg/m²   Exam - See flow sheet    Fetal heart rate is normal    Assessment-  Diagnoses and all orders for this visit:    6 weeks gestation of pregnancy    Other depression    Other orders  -     POC Urinalysis Dipstick    Her ultrasound today is reassuring and is consistent with her LMP.  Reviewed ultrasound and prenatal labs.  Patient will be contacted with mental health providers that accept her insurance and recommend counseling to help with feelings of depression.  She was advised to follow-up with me in 4 weeks if she plans on continuing with the pregnancy.    I spent 20 minutes caring for Krishna on this date of service. This time includes time spent by me in the following activities:reviewing tests, obtaining and/or reviewing a separately obtained history, performing a medically appropriate examination and/or evaluation , counseling and educating the patient/family/caregiver, referring and communicating with other health care professionals  and documenting information in the medical record

## 2021-08-12 ENCOUNTER — TELEPHONE (OUTPATIENT)
Dept: OBSTETRICS AND GYNECOLOGY | Facility: CLINIC | Age: 26
End: 2021-08-12

## 2021-08-12 NOTE — TELEPHONE ENCOUNTER
----- Message from Padmini Sims MD sent at 8/11/2021  4:03 PM EDT -----  Please call patient with counselors that take her insurance

## 2021-08-12 NOTE — TELEPHONE ENCOUNTER
Pt given phone numbers to [facility] at [phone], and [facility] at [phone], to call to schedule appt.

## 2021-08-17 ENCOUNTER — TELEPHONE (OUTPATIENT)
Dept: OBSTETRICS AND GYNECOLOGY | Facility: CLINIC | Age: 26
End: 2021-08-17

## 2021-08-17 RX ORDER — PROMETHAZINE HYDROCHLORIDE 25 MG/1
25 TABLET ORAL EVERY 6 HOURS PRN
Qty: 30 TABLET | Refills: 2 | Status: SHIPPED | OUTPATIENT
Start: 2021-08-17 | End: 2021-09-24 | Stop reason: SDUPTHER

## 2021-08-17 NOTE — TELEPHONE ENCOUNTER
Regan Sims,  Patient reqeuesting nausea meds.   Pharmacy:  [facility] #[id number] - [address] AT [facility] [place] - [phone] [facility] [phone] FX    Thank you,  Emmie

## 2021-08-17 NOTE — TELEPHONE ENCOUNTER
Pt called requesting nausea meds. Please send to [facility].     Pharmacy confirmed  [facility] #[id number] - [place] [address] AT [facility] [place] - [phone] [facility] [phone] FX    Thank you.

## 2021-09-10 ENCOUNTER — ROUTINE PRENATAL (OUTPATIENT)
Dept: OBSTETRICS AND GYNECOLOGY | Facility: CLINIC | Age: 26
End: 2021-09-10

## 2021-09-10 VITALS — DIASTOLIC BLOOD PRESSURE: 68 MMHG | WEIGHT: 271 LBS | BODY MASS INDEX: 44.41 KG/M2 | SYSTOLIC BLOOD PRESSURE: 101 MMHG

## 2021-09-10 DIAGNOSIS — Z3A.10 10 WEEKS GESTATION OF PREGNANCY: ICD-10-CM

## 2021-09-10 DIAGNOSIS — O99.210 MATERNAL OBESITY AFFECTING PREGNANCY, ANTEPARTUM: Primary | ICD-10-CM

## 2021-09-10 LAB
GLUCOSE UR STRIP-MCNC: NEGATIVE MG/DL
PROT UR STRIP-MCNC: NEGATIVE MG/DL

## 2021-09-10 PROCEDURE — 99213 OFFICE O/P EST LOW 20 MIN: CPT | Performed by: OBSTETRICS & GYNECOLOGY

## 2021-09-10 NOTE — PROGRESS NOTES
Chief Complaint   Patient presents with   • Routine Prenatal Visit     HPI- Pt is 26 y.o.  at 10w4d here for prenatal visit.  She is doing well with no major complaints.       ROS-     - No vaginal bleeding    GI- No abdominal pain    /68   Wt 123 kg (271 lb)   LMP 2021 (Exact Date)   BMI 44.41 kg/m²   Exam - See flow sheet    Fetal heart rate is normal    Assessment-  Diagnoses and all orders for this visit:    Maternal obesity affecting pregnancy, antepartum  -     [id number] PLUS Core - Blood,    10 weeks gestation of pregnancy  -     [id number] PLUS Core - Blood,    Discussed genetic screening and patient desires cell free DNA testing.  Order was placed.  She will follow-up with me in 4 weeks.    I spent 20 minutes caring for Krishna on this date of service. This time includes time spent by me in the following activities:obtaining and/or reviewing a separately obtained history, performing a medically appropriate examination and/or evaluation , counseling and educating the patient/family/caregiver, ordering medications, tests, or procedures and documenting information in the medical record

## 2021-09-22 LAB
CFDNA.FET/CFDNA.TOTAL SFR FETUS: NORMAL %
CITATION REF LAB TEST: NORMAL
FET 13+18+21+X+Y ANEUP PLAS.CFDNA: NEGATIVE
FET CHR 21 TS PLAS.CFDNA QL: NEGATIVE
FET SEX PLAS.CFDNA DOSAGE CFDNA: NORMAL
FET TS 13 RISK PLAS.CFDNA QL: NEGATIVE
FET TS 18 RISK WBC.DNA+CFDNA QL: NEGATIVE
GA EST FROM CONCEPTION DATE: NORMAL D
GESTATIONAL AGE > 9:: YES
LAB DIRECTOR NAME PROVIDER: NORMAL
LAB DIRECTOR NAME PROVIDER: NORMAL
LABORATORY COMMENT REPORT: NORMAL
LIMITATIONS OF THE TEST: NORMAL
NEGATIVE PREDICTIVE VALUE: NORMAL
NOTE: NORMAL
PERFORMANCE CHARACTERISTICS: NORMAL
POSITIVE PREDICTIVE VALUE: NORMAL
REF LAB TEST METHOD: NORMAL
TEST PERFORMANCE INFO SPEC: NORMAL

## 2021-09-23 ENCOUNTER — TELEPHONE (OUTPATIENT)
Dept: OBSTETRICS AND GYNECOLOGY | Facility: CLINIC | Age: 26
End: 2021-09-23

## 2021-09-24 ENCOUNTER — TELEPHONE (OUTPATIENT)
Dept: OBSTETRICS AND GYNECOLOGY | Facility: CLINIC | Age: 26
End: 2021-09-24

## 2021-09-24 RX ORDER — PROMETHAZINE HYDROCHLORIDE 25 MG/1
25 TABLET ORAL EVERY 6 HOURS PRN
Qty: 30 TABLET | Refills: 2 | Status: SHIPPED | OUTPATIENT
Start: 2021-09-24 | End: 2021-11-01

## 2021-09-24 RX ORDER — PRENATAL WITH FERROUS FUM AND FOLIC ACID 3080; 920; 120; 400; 22; 1.84; 3; 20; 10; 1; 12; 200; 27; 25; 2 [IU]/1; [IU]/1; MG/1; [IU]/1; MG/1; MG/1; MG/1; MG/1; MG/1; MG/1; UG/1; MG/1; MG/1; MG/1; MG/1
1 TABLET ORAL DAILY
Qty: 90 TABLET | Refills: 2 | Status: SHIPPED | OUTPATIENT
Start: 2021-09-24 | End: 2021-10-24

## 2021-09-24 NOTE — TELEPHONE ENCOUNTER
Regan Sims,  Patient requesting refills for:  promethazine (PHENERGAN) 25 MG tablet [[id number]] (Order [id number]),    Prenatal Vit-Fe Fumarate-FA (Prenatal 27-1) 27-1 MG tablet tablet [[id number]]    Also requesting inhaler- Symbacort (?) (red) inhaler  And Ventillin (?).     Patient advised to go to ER if she feels like she is dehydrated or having issues breathing.    Please advise if these can be filled.        Pharmacy:  [facility] #[id number] [place] [address] [address] AT [facility] [phone] [facility] [phone] FX    Thank you,  Emmie

## 2021-09-24 NOTE — TELEPHONE ENCOUNTER
I have sent prescriptions for the Phenergan and the prenatal vitamins.  She should see her primary care provider for prescriptions on her inhalers.  When I look in her chart, I don't see that I have ever prescribed those to her before and I typically do not treat asthma.

## 2021-10-11 ENCOUNTER — ROUTINE PRENATAL (OUTPATIENT)
Dept: OBSTETRICS AND GYNECOLOGY | Facility: CLINIC | Age: 26
End: 2021-10-11

## 2021-10-11 VITALS — DIASTOLIC BLOOD PRESSURE: 66 MMHG | WEIGHT: 273 LBS | SYSTOLIC BLOOD PRESSURE: 103 MMHG | BODY MASS INDEX: 44.74 KG/M2

## 2021-10-11 DIAGNOSIS — O21.9 NAUSEA AND VOMITING IN PREGNANCY: ICD-10-CM

## 2021-10-11 DIAGNOSIS — Z3A.15 15 WEEKS GESTATION OF PREGNANCY: Primary | ICD-10-CM

## 2021-10-11 DIAGNOSIS — O99.212 OBESITY AFFECTING PREGNANCY IN SECOND TRIMESTER: ICD-10-CM

## 2021-10-11 PROCEDURE — 99213 OFFICE O/P EST LOW 20 MIN: CPT | Performed by: NURSE PRACTITIONER

## 2021-10-11 RX ORDER — CEPHALEXIN 500 MG/1
CAPSULE ORAL
COMMUNITY
Start: 2021-10-06 | End: 2021-10-21 | Stop reason: HOSPADM

## 2021-10-11 NOTE — PROGRESS NOTES
Chief Complaint   Patient presents with   • Routine Prenatal Visit     Needs refill on prenatal vitamins.        OB follow up     Krishna Clarke is a 26 y.o.  15w0d being seen today for her obstetrical visit.  Patient reports nausea. Fetal movement: present. She is taking phenergan PRN HS for nausea. Declines zofran Rx at this time.     Review of Systems  Cramping/contractions: denies  Vaginal bleeding: denies  Fetal movement: present    /66   Wt 124 kg (273 lb)   LMP 2021 (Exact Date)   BMI 44.74 kg/m²     FHT: 150 BPM   Uterine Size: size equals dates       Diagnoses and all orders for this visit:    1. 15 weeks gestation of pregnancy (Primary)  -     US Ob 14 + Weeks Single or First Gestation; Future    2. Nausea and vomiting in pregnancy    3. Obesity affecting pregnancy in second trimester        S/p flu vaccine  S/p Covid 19 vaccine  Encouraged smaller more frequent meals. Call if unable to keep liquids down  Anatomy scan at next visit  Reviewed quickening  Reviewed this stage of pregnancy  Problem list updated     Follow up in 4 week(s) with Dr. Sims    I have spent 20 min in face to face time with the patient and 20 min of this time was spent in counseling on the above stated issues.    Beatriz Alford, APRN  10/11/2021  11:42 EDT

## 2021-10-19 ENCOUNTER — TELEPHONE (OUTPATIENT)
Dept: OBSTETRICS AND GYNECOLOGY | Facility: CLINIC | Age: 26
End: 2021-10-19

## 2021-10-19 NOTE — TELEPHONE ENCOUNTER
Fatigue and cramping can both be normal in pregnancy.  She was not anemic at her initial OB visit, but she does need to be taking her prenatal vitamin every day.  If she is not tolerating her diet, having vaginal bleeding, or pain is becoming uncontrolled, she does need to be seen at the hospital.  I would recommend eating high-protein snacks and hydrating throughout the day to help with symptoms and resting whenever she is able.

## 2021-10-19 NOTE — TELEPHONE ENCOUNTER
Regan Sims,  Pt calling to report extreme fatigue & cramping for the past 2 days. No bleeding or unusual discharge, just those two symptoms. Please advise if she needs to be seen sooner than her next appt.   Thank you,  Emmie

## 2021-10-20 ENCOUNTER — HOSPITAL ENCOUNTER (EMERGENCY)
Facility: HOSPITAL | Age: 26
Discharge: HOME | End: 2021-10-21
Attending: OBSTETRICS & GYNECOLOGY | Admitting: OBSTETRICS & GYNECOLOGY

## 2021-10-20 VITALS
HEART RATE: 71 BPM | RESPIRATION RATE: 18 BRPM | DIASTOLIC BLOOD PRESSURE: 58 MMHG | SYSTOLIC BLOOD PRESSURE: 99 MMHG | BODY MASS INDEX: 45.79 KG/M2 | TEMPERATURE: 98.8 F | OXYGEN SATURATION: 97 % | HEIGHT: 65 IN | WEIGHT: 274.8 LBS

## 2021-10-20 DIAGNOSIS — O26.892 ABDOMINAL PAIN DURING PREGNANCY IN SECOND TRIMESTER: Primary | ICD-10-CM

## 2021-10-20 DIAGNOSIS — Z3A.16 16 WEEKS GESTATION OF PREGNANCY: ICD-10-CM

## 2021-10-20 DIAGNOSIS — R10.9 ABDOMINAL PAIN DURING PREGNANCY IN SECOND TRIMESTER: Primary | ICD-10-CM

## 2021-10-20 LAB
BILIRUB UR QL STRIP: NEGATIVE
CLARITY UR: CLEAR
COLOR UR: YELLOW
GLUCOSE UR STRIP-MCNC: NEGATIVE MG/DL
HGB UR QL STRIP.AUTO: NEGATIVE
KETONES UR QL STRIP: NEGATIVE
LEUKOCYTE ESTERASE UR QL STRIP.AUTO: NEGATIVE
NITRITE UR QL STRIP: NEGATIVE
PH UR STRIP.AUTO: 6.5 [PH] (ref 5–8)
PROT UR QL STRIP: NEGATIVE
SP GR UR STRIP: 1.01 (ref 1–1.03)
UROBILINOGEN UR QL STRIP: NORMAL

## 2021-10-20 PROCEDURE — 87086 URINE CULTURE/COLONY COUNT: CPT | Performed by: OBSTETRICS & GYNECOLOGY

## 2021-10-20 PROCEDURE — 99283 EMERGENCY DEPT VISIT LOW MDM: CPT | Performed by: OBSTETRICS & GYNECOLOGY

## 2021-10-20 PROCEDURE — 85025 COMPLETE CBC W/AUTO DIFF WBC: CPT | Performed by: OBSTETRICS & GYNECOLOGY

## 2021-10-20 PROCEDURE — 81003 URINALYSIS AUTO W/O SCOPE: CPT | Performed by: OBSTETRICS & GYNECOLOGY

## 2021-10-20 PROCEDURE — 80053 COMPREHEN METABOLIC PANEL: CPT | Performed by: OBSTETRICS & GYNECOLOGY

## 2021-10-20 RX ORDER — ALBUTEROL SULFATE 2.5 MG/3ML
2.5 SOLUTION RESPIRATORY (INHALATION) EVERY 6 HOURS PRN
COMMUNITY

## 2021-10-21 ENCOUNTER — HOSPITAL ENCOUNTER (OUTPATIENT)
Dept: ULTRASOUND IMAGING | Facility: HOSPITAL | Age: 26
Discharge: HOME | End: 2021-10-21

## 2021-10-21 ENCOUNTER — TRANSCRIBE ORDERS (OUTPATIENT)
Dept: ULTRASOUND IMAGING | Facility: HOSPITAL | Age: 26
End: 2021-10-21

## 2021-10-21 ENCOUNTER — TELEPHONE (OUTPATIENT)
Dept: ULTRASOUND IMAGING | Facility: HOSPITAL | Age: 26
End: 2021-10-21

## 2021-10-21 ENCOUNTER — HOSPITAL ENCOUNTER (OUTPATIENT)
Facility: HOSPITAL | Age: 26
End: 2021-10-21
Attending: OBSTETRICS & GYNECOLOGY | Admitting: OBSTETRICS & GYNECOLOGY

## 2021-10-21 ENCOUNTER — OFFICE VISIT (OUTPATIENT)
Dept: OBSTETRICS AND GYNECOLOGY | Facility: CLINIC | Age: 26
End: 2021-10-21

## 2021-10-21 VITALS
HEART RATE: 73 BPM | TEMPERATURE: 98 F | HEIGHT: 65 IN | WEIGHT: 277 LBS | DIASTOLIC BLOOD PRESSURE: 65 MMHG | SYSTOLIC BLOOD PRESSURE: 117 MMHG | BODY MASS INDEX: 46.15 KG/M2

## 2021-10-21 DIAGNOSIS — R10.9 ABDOMINAL PAIN AFFECTING PREGNANCY, ANTEPARTUM: ICD-10-CM

## 2021-10-21 DIAGNOSIS — O26.899 ABDOMINAL PAIN IN PREGNANCY, ANTEPARTUM: Primary | ICD-10-CM

## 2021-10-21 DIAGNOSIS — R10.9 ABDOMINAL PAIN IN PREGNANCY, ANTEPARTUM: Primary | ICD-10-CM

## 2021-10-21 DIAGNOSIS — R10.9 ABDOMINAL PAIN AFFECTING PREGNANCY, ANTEPARTUM: Primary | ICD-10-CM

## 2021-10-21 DIAGNOSIS — O26.899 ABDOMINAL PAIN AFFECTING PREGNANCY, ANTEPARTUM: Primary | ICD-10-CM

## 2021-10-21 DIAGNOSIS — O26.899 ABDOMINAL PAIN AFFECTING PREGNANCY, ANTEPARTUM: ICD-10-CM

## 2021-10-21 LAB
ALBUMIN SERPL-MCNC: 3.3 G/DL (ref 3.5–5.2)
ALBUMIN/GLOB SERPL: 1 G/DL
ALP SERPL-CCNC: 38 U/L (ref 39–117)
ALT SERPL W P-5'-P-CCNC: 11 U/L (ref 1–33)
ANION GAP SERPL CALCULATED.3IONS-SCNC: 10.7 MMOL/L (ref 5–15)
AST SERPL-CCNC: 12 U/L (ref 1–32)
BASOPHILS # BLD AUTO: 0.01 10*3/MM3 (ref 0–0.2)
BASOPHILS NFR BLD AUTO: 0.1 % (ref 0–1.5)
BILIRUB SERPL-MCNC: 0.2 MG/DL (ref 0–1.2)
BUN SERPL-MCNC: 6 MG/DL (ref 6–20)
BUN/CREAT SERPL: 10.3 (ref 7–25)
CALCIUM SPEC-SCNC: 8.6 MG/DL (ref 8.6–10.5)
CHLORIDE SERPL-SCNC: 105 MMOL/L (ref 98–107)
CO2 SERPL-SCNC: 21.3 MMOL/L (ref 22–29)
CREAT SERPL-MCNC: 0.58 MG/DL (ref 0.57–1)
DEPRECATED RDW RBC AUTO: 42.3 FL (ref 37–54)
EOSINOPHIL # BLD AUTO: 0.14 10*3/MM3 (ref 0–0.4)
EOSINOPHIL NFR BLD AUTO: 1.6 % (ref 0.3–6.2)
ERYTHROCYTE [DISTWIDTH] IN BLOOD BY AUTOMATED COUNT: 13.2 % (ref 12.3–15.4)
GFR SERPL CREATININE-BSD FRML MDRD: 150 ML/MIN/1.73
GLOBULIN UR ELPH-MCNC: 3.2 GM/DL
GLUCOSE SERPL-MCNC: 87 MG/DL (ref 65–99)
HCT VFR BLD AUTO: 33 % (ref 34–46.6)
HGB BLD-MCNC: 11.4 G/DL (ref 12–15.9)
IMM GRANULOCYTES # BLD AUTO: 0.01 10*3/MM3 (ref 0–0.05)
IMM GRANULOCYTES NFR BLD AUTO: 0.1 % (ref 0–0.5)
LYMPHOCYTES # BLD AUTO: 3.25 10*3/MM3 (ref 0.7–3.1)
LYMPHOCYTES NFR BLD AUTO: 37.8 % (ref 19.6–45.3)
MCH RBC QN AUTO: 30.8 PG (ref 26.6–33)
MCHC RBC AUTO-ENTMCNC: 34.5 G/DL (ref 31.5–35.7)
MCV RBC AUTO: 89.2 FL (ref 79–97)
MONOCYTES # BLD AUTO: 0.67 10*3/MM3 (ref 0.1–0.9)
MONOCYTES NFR BLD AUTO: 7.8 % (ref 5–12)
NEUTROPHILS NFR BLD AUTO: 4.51 10*3/MM3 (ref 1.7–7)
NEUTROPHILS NFR BLD AUTO: 52.6 % (ref 42.7–76)
NRBC BLD AUTO-RTO: 0 /100 WBC (ref 0–0.2)
PLATELET # BLD AUTO: 205 10*3/MM3 (ref 140–450)
PMV BLD AUTO: 10.6 FL (ref 6–12)
POTASSIUM SERPL-SCNC: 3.5 MMOL/L (ref 3.5–5.2)
PROT SERPL-MCNC: 6.5 G/DL (ref 6–8.5)
RBC # BLD AUTO: 3.7 10*6/MM3 (ref 3.77–5.28)
SODIUM SERPL-SCNC: 137 MMOL/L (ref 136–145)
WBC # BLD AUTO: 8.59 10*3/MM3 (ref 3.4–10.8)

## 2021-10-21 PROCEDURE — 76817 TRANSVAGINAL US OBSTETRIC: CPT

## 2021-10-21 PROCEDURE — 76805 OB US >/= 14 WKS SNGL FETUS: CPT

## 2021-10-21 PROCEDURE — 76817 TRANSVAGINAL US OBSTETRIC: CPT | Performed by: OBSTETRICS & GYNECOLOGY

## 2021-10-21 PROCEDURE — 76805 OB US >/= 14 WKS SNGL FETUS: CPT | Performed by: OBSTETRICS & GYNECOLOGY
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
## 2021-10-21 NOTE — TELEPHONE ENCOUNTER
We received a request from L&D to contact patient for an appt with us today. They discharged her on 10/20/21.  I Called patient and informed her we would see her today 10/21/21 at 2:30, pt agreed. js

## 2021-10-21 NOTE — NURSING NOTE
Pt discharged home after lab work all wnl.  Dr. Cade has filled out paperwork for pt to get US through [facility].  Faxed to them by RN.  Explained to pt that they will call her tomorrow.  Pt may take Tylenol for pain and/or use heating pad.  Pt to also call Dr Sims tomorrow if she does not feel any better.  Pt understands and is agreeable.

## 2021-10-22 PROBLEM — O26.892: Status: ACTIVE | Noted: 2021-10-22

## 2021-10-22 PROBLEM — R10.9 ABDOMINAL PAIN IN PREGNANCY, ANTEPARTUM: Status: ACTIVE | Noted: 2021-10-22

## 2021-10-22 PROBLEM — O26.899 ABDOMINAL PAIN IN PREGNANCY, ANTEPARTUM: Status: ACTIVE | Noted: 2021-10-22

## 2021-10-22 PROBLEM — M25.559: Status: ACTIVE | Noted: 2021-10-22

## 2021-10-22 LAB — BACTERIA SPEC AEROBE CULT: NO GROWTH

## 2021-10-22 NOTE — PROGRESS NOTES
Patient did not stay yesterday to speak with physician.  She was 30 minutes late and had to  her children.

## 2021-10-25 ENCOUNTER — TELEPHONE (OUTPATIENT)
Dept: OBSTETRICS AND GYNECOLOGY | Facility: HOSPITAL | Age: 26
End: 2021-10-25

## 2021-10-25 NOTE — TELEPHONE ENCOUNTER
Called pt to introduce [facility] to pt.  This was the first attempt to contact the pt.  Pt accepted the program.  Pt busy now but will call back for intake.  Pt sent info via [employer] as well.

## 2021-10-29 ENCOUNTER — TELEPHONE (OUTPATIENT)
Dept: OBSTETRICS AND GYNECOLOGY | Facility: CLINIC | Age: 26
End: 2021-10-29

## 2021-11-01 RX ORDER — PROMETHAZINE HYDROCHLORIDE 25 MG/1
25 TABLET ORAL EVERY 6 HOURS PRN
Qty: 30 TABLET | Refills: 2 | Status: SHIPPED | OUTPATIENT
Start: 2021-11-01 | End: 2022-02-09 | Stop reason: SDUPTHER

## 2021-11-10 ENCOUNTER — TELEPHONE (OUTPATIENT)
Dept: OBSTETRICS AND GYNECOLOGY | Facility: CLINIC | Age: 26
End: 2021-11-10

## 2021-11-10 NOTE — TELEPHONE ENCOUNTER
Regan Sims,   Pt called requesting a note for work saying she needs to park closer to the building. She said if this note can be written then she will  at her appointment on Friday 11/12.     Thanks,   Jennifer

## 2021-11-12 ENCOUNTER — ROUTINE PRENATAL (OUTPATIENT)
Dept: OBSTETRICS AND GYNECOLOGY | Facility: CLINIC | Age: 26
End: 2021-11-12

## 2021-11-12 VITALS — BODY MASS INDEX: 46.06 KG/M2 | SYSTOLIC BLOOD PRESSURE: 101 MMHG | DIASTOLIC BLOOD PRESSURE: 72 MMHG | WEIGHT: 276.8 LBS

## 2021-11-12 DIAGNOSIS — Z3A.19 19 WEEKS GESTATION OF PREGNANCY: ICD-10-CM

## 2021-11-12 DIAGNOSIS — O99.212 OBESITY AFFECTING PREGNANCY IN SECOND TRIMESTER: Primary | ICD-10-CM

## 2021-11-12 PROBLEM — O21.9 NAUSEA AND VOMITING IN PREGNANCY: Status: RESOLVED | Noted: 2021-10-11 | Resolved: 2021-11-12

## 2021-11-12 PROBLEM — Z3A.15 15 WEEKS GESTATION OF PREGNANCY: Status: RESOLVED | Noted: 2021-10-11 | Resolved: 2021-11-12

## 2021-11-12 LAB
GLUCOSE UR STRIP-MCNC: NEGATIVE MG/DL
PROT UR STRIP-MCNC: ABNORMAL MG/DL

## 2021-11-12 PROCEDURE — 99213 OFFICE O/P EST LOW 20 MIN: CPT | Performed by: OBSTETRICS & GYNECOLOGY

## 2021-11-12 RX ORDER — PNV,CALCIUM 72/IRON/FOLIC ACID 27 MG-1 MG
1 TABLET ORAL DAILY
COMMUNITY
Start: 2021-10-25

## 2021-11-12 NOTE — PROGRESS NOTES
Chief Complaint   Patient presents with   • Routine Prenatal Visit     HPI- Pt is 26 y.o.  at 19w4d here for prenatal visit.  She has no complaints.  She is feeling lots of fetal movement.    ROS-     - No vaginal bleeding    GI- No abdominal pain    /72   Wt 126 kg (276 lb 12.8 oz)   LMP 2021 (Exact Date)   BMI 46.06 kg/m²   Exam - See flow sheet    Fetal heart rate is normal    Assessment-  Diagnoses and all orders for this visit:    Obesity affecting pregnancy in second trimester  -     US Ob Follow Up Transabdominal Approach; Future    19 weeks gestation of pregnancy    Other orders  -     Prenatal Vit-Fe Fumarate-FA (WesTab Plus) 27-1 MG tablet; Take 1 tablet by mouth Daily.  -     POC Urinalysis Dipstick    Fetal anatomy was limited on ultrasound today.  We will have her return in 2 weeks for repeat anatomy ultrasound.  She will follow-up with me in 4 weeks.    I spent 20 minutes caring for Krishna on this date of service. This time includes time spent by me in the following activities:reviewing tests, obtaining and/or reviewing a separately obtained history, performing a medically appropriate examination and/or evaluation , counseling and educating the patient/family/caregiver, ordering medications, tests, or procedures and documenting information in the medical record

## 2021-11-30 ENCOUNTER — TELEPHONE (OUTPATIENT)
Dept: OBSTETRICS AND GYNECOLOGY | Facility: CLINIC | Age: 26
End: 2021-11-30

## 2021-11-30 DIAGNOSIS — O99.212 OBESITY AFFECTING PREGNANCY IN SECOND TRIMESTER: Primary | ICD-10-CM

## 2021-11-30 NOTE — TELEPHONE ENCOUNTER
Petra, I have placed referral to [facility]    Called patient and explained that the fetal heart could still not be well visualized on anatomy ultrasound and recommend referral to [facility].

## 2021-12-10 ENCOUNTER — ROUTINE PRENATAL (OUTPATIENT)
Dept: OBSTETRICS AND GYNECOLOGY | Facility: CLINIC | Age: 26
End: 2021-12-10

## 2021-12-10 VITALS — SYSTOLIC BLOOD PRESSURE: 91 MMHG | BODY MASS INDEX: 46.43 KG/M2 | WEIGHT: 279 LBS | DIASTOLIC BLOOD PRESSURE: 69 MMHG

## 2021-12-10 DIAGNOSIS — O99.212 OBESITY AFFECTING PREGNANCY IN SECOND TRIMESTER: Primary | ICD-10-CM

## 2021-12-10 DIAGNOSIS — Z3A.23 23 WEEKS GESTATION OF PREGNANCY: ICD-10-CM

## 2021-12-10 LAB
GLUCOSE UR STRIP-MCNC: NEGATIVE MG/DL
PROT UR STRIP-MCNC: ABNORMAL MG/DL

## 2021-12-10 PROCEDURE — 99213 OFFICE O/P EST LOW 20 MIN: CPT | Performed by: OBSTETRICS & GYNECOLOGY

## 2021-12-10 NOTE — PROGRESS NOTES
Chief Complaint   Patient presents with   • Routine Prenatal Visit     HPI- Pt is 26 y.o.  at 23w4d here for prenatal visit.  Patient has no complaints.  She states she recently had a GI illness that she got from one of her kids.  She had vomiting associated with it and states symptoms have resolved.  She is feeling lots of fetal movement.    ROS-     - No vaginal bleeding    GI- No abdominal pain    BP 91/69   Wt 127 kg (279 lb)   LMP 2021 (Exact Date)   BMI 46.43 kg/m²   Exam - See flow sheet    Fetal heart rate is normal    Assessment-  Diagnoses and all orders for this visit:    Obesity affecting pregnancy in second trimester    23 weeks gestation of pregnancy  -     Gestational Screen 1 Hr ([facility])  -     CBC (No Diff)    Other orders  -     POC Urinalysis Dipstick    Discussed 1 hour glucose test at her next visit and instructions given.  She is overall doing well with no major issues.  She will see me back in 4 weeks.

## 2021-12-13 ENCOUNTER — OFFICE VISIT (OUTPATIENT)
Dept: OBSTETRICS AND GYNECOLOGY | Facility: CLINIC | Age: 26
End: 2021-12-13

## 2021-12-13 ENCOUNTER — HOSPITAL ENCOUNTER (OUTPATIENT)
Dept: ULTRASOUND IMAGING | Facility: HOSPITAL | Age: 26
Discharge: HOME | End: 2021-12-13
Admitting: OBSTETRICS & GYNECOLOGY

## 2021-12-13 VITALS
SYSTOLIC BLOOD PRESSURE: 108 MMHG | DIASTOLIC BLOOD PRESSURE: 62 MMHG | HEIGHT: 65 IN | HEART RATE: 86 BPM | WEIGHT: 278.6 LBS | TEMPERATURE: 97.8 F | BODY MASS INDEX: 46.42 KG/M2

## 2021-12-13 DIAGNOSIS — E66.01 MORBID OBESITY WITH BMI OF 40.0-44.9, ADULT (HCC): Primary | ICD-10-CM

## 2021-12-13 DIAGNOSIS — O99.212 OBESITY AFFECTING PREGNANCY IN SECOND TRIMESTER: ICD-10-CM

## 2021-12-13 PROCEDURE — 76811 OB US DETAILED SNGL FETUS: CPT

## 2021-12-13 PROCEDURE — 76811 OB US DETAILED SNGL FETUS: CPT | Performed by: OBSTETRICS & GYNECOLOGY

## 2021-12-13 NOTE — PROGRESS NOTES
Pt reports that she is doing well and denies vaginal bleeding, cramping, contractions, LOF. Reports active fetal movement.

## 2021-12-13 NOTE — PROGRESS NOTES
"Krishna Clarke is a 26 y.o. female,  Patient's last menstrual period was 2021 (exact date). EDC of 2022, by Last Menstrual Period now 24w0d weeks seen for F/U as requested by Padmini Sims MD secondary to:    1) Morbid obesity - BMI - 46.36 - Patient was unable to stay for consult.    Vitals:    21 0923   BP: 108/62   BP Location: Right arm   Patient Position: Sitting   Pulse: 86   Temp: 97.8 °F (36.6 °C)   Weight: 126 kg (278 lb 9.6 oz)   Height: 165.1 cm (65\")       Ultrasound images and findings reviewed.  Appropriate fetal growth.  No abnormalities seen but anatomy is limited by fetal position and maternal body habitus.    ASSESSMENT:  10Morbid obesity _BMI - 46.36  Questions answered.    Recommendations:    1) Maternal consultation in 4 weeks  2) Serial U/S every 4 weeks for fetal growth  3) Weekly BPP beginning @ 32 weeks    No charge      Thank you for utilizing our ultrasound and consultative services.  If I may be of any further service, please do not hesitate to contact me.    Sincerely,    Ramon Stewart MD  Maternal-Fetal Medicine    "

## 2022-01-10 ENCOUNTER — ROUTINE PRENATAL (OUTPATIENT)
Dept: OBSTETRICS AND GYNECOLOGY | Facility: CLINIC | Age: 27
End: 2022-01-10

## 2022-01-10 VITALS — DIASTOLIC BLOOD PRESSURE: 79 MMHG | WEIGHT: 285 LBS | BODY MASS INDEX: 47.43 KG/M2 | SYSTOLIC BLOOD PRESSURE: 114 MMHG

## 2022-01-10 DIAGNOSIS — Z87.59 HISTORY OF STILLBIRTH: ICD-10-CM

## 2022-01-10 DIAGNOSIS — O99.213 OBESITY AFFECTING PREGNANCY IN THIRD TRIMESTER: ICD-10-CM

## 2022-01-10 DIAGNOSIS — Z3A.28 28 WEEKS GESTATION OF PREGNANCY: Primary | ICD-10-CM

## 2022-01-10 DIAGNOSIS — R45.89 DEPRESSED MOOD: ICD-10-CM

## 2022-01-10 DIAGNOSIS — O21.9 NAUSEA AND VOMITING DURING PREGNANCY: ICD-10-CM

## 2022-01-10 DIAGNOSIS — O47.00 PRETERM UTERINE CONTRACTIONS: ICD-10-CM

## 2022-01-10 PROBLEM — O99.212 OBESITY AFFECTING PREGNANCY IN SECOND TRIMESTER: Status: RESOLVED | Noted: 2021-10-11 | Resolved: 2022-01-10

## 2022-01-10 PROCEDURE — 99214 OFFICE O/P EST MOD 30 MIN: CPT | Performed by: NURSE PRACTITIONER
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
## 2022-01-11 LAB
ERYTHROCYTE [DISTWIDTH] IN BLOOD BY AUTOMATED COUNT: 12.8 % (ref 11.7–15.4)
GLUCOSE 1H P 50 G GLC PO SERPL-MCNC: 98 MG/DL (ref 65–139)
HCT VFR BLD AUTO: 35.1 % (ref 34–46.6)
HGB BLD-MCNC: 11.5 G/DL (ref 11.1–15.9)
MCH RBC QN AUTO: 29.5 PG (ref 26.6–33)
MCHC RBC AUTO-ENTMCNC: 32.8 G/DL (ref 31.5–35.7)
MCV RBC AUTO: 90 FL (ref 79–97)
PLATELET # BLD AUTO: 230 X10E3/UL (ref 150–450)
RBC # BLD AUTO: 3.9 X10E6/UL (ref 3.77–5.28)
WBC # BLD AUTO: 9 X10E3/UL (ref 3.4–10.8)

## 2022-01-26 ENCOUNTER — TELEPHONE (OUTPATIENT)
Dept: OBSTETRICS AND GYNECOLOGY | Facility: HOSPITAL | Age: 27
End: 2022-01-26
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
## 2022-01-28 ENCOUNTER — ROUTINE PRENATAL (OUTPATIENT)
Dept: OBSTETRICS AND GYNECOLOGY | Facility: CLINIC | Age: 27
End: 2022-01-28

## 2022-01-28 VITALS — BODY MASS INDEX: 47.76 KG/M2 | WEIGHT: 287 LBS | DIASTOLIC BLOOD PRESSURE: 66 MMHG | SYSTOLIC BLOOD PRESSURE: 94 MMHG

## 2022-01-28 DIAGNOSIS — Z87.59 HISTORY OF STILLBIRTH: Primary | ICD-10-CM

## 2022-01-28 DIAGNOSIS — O99.213 OBESITY AFFECTING PREGNANCY IN THIRD TRIMESTER: ICD-10-CM

## 2022-01-28 DIAGNOSIS — Z3A.30 30 WEEKS GESTATION OF PREGNANCY: ICD-10-CM

## 2022-01-28 LAB
GLUCOSE UR STRIP-MCNC: NEGATIVE MG/DL
PROT UR STRIP-MCNC: NEGATIVE MG/DL

## 2022-01-28 PROCEDURE — 99213 OFFICE O/P EST LOW 20 MIN: CPT | Performed by: OBSTETRICS & GYNECOLOGY

## 2022-01-28 RX ORDER — BUDESONIDE AND FORMOTEROL FUMARATE DIHYDRATE 80; 4.5 UG/1; UG/1
2 AEROSOL RESPIRATORY (INHALATION)
COMMUNITY
Start: 2022-01-16 | End: 2023-02-06
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
## 2022-02-08 ENCOUNTER — ROUTINE PRENATAL (OUTPATIENT)
Dept: OBSTETRICS AND GYNECOLOGY | Facility: CLINIC | Age: 27
End: 2022-02-08

## 2022-02-08 VITALS — SYSTOLIC BLOOD PRESSURE: 106 MMHG | DIASTOLIC BLOOD PRESSURE: 71 MMHG | WEIGHT: 292 LBS | BODY MASS INDEX: 48.59 KG/M2

## 2022-02-08 DIAGNOSIS — Z23 NEED FOR TDAP VACCINATION: ICD-10-CM

## 2022-02-08 DIAGNOSIS — Z87.59 HISTORY OF STILLBIRTH: ICD-10-CM

## 2022-02-08 DIAGNOSIS — Z86.19 HISTORY OF HERPES GENITALIS: ICD-10-CM

## 2022-02-08 DIAGNOSIS — O99.213 OBESITY AFFECTING PREGNANCY IN THIRD TRIMESTER: Primary | ICD-10-CM

## 2022-02-08 DIAGNOSIS — Z3A.32 32 WEEKS GESTATION OF PREGNANCY: ICD-10-CM

## 2022-02-08 PROCEDURE — 99213 OFFICE O/P EST LOW 20 MIN: CPT | Performed by: OBSTETRICS & GYNECOLOGY

## 2022-02-08 PROCEDURE — 90471 IMMUNIZATION ADMIN: CPT | Performed by: OBSTETRICS & GYNECOLOGY

## 2022-02-08 PROCEDURE — 90715 TDAP VACCINE 7 YRS/> IM: CPT | Performed by: OBSTETRICS & GYNECOLOGY

## 2022-02-08 NOTE — PROGRESS NOTES
Chief Complaint   Patient presents with   • Routine Prenatal Visit     HPI- Pt is 26 y.o.  at 32w1d here for prenatal visit. She is doing well with no major complaints. She reports good fetal movement. She states that she currently has her oldest two children back at home and is hoping to get her youngest two back.    ROS-     - No vaginal bleeding    GI- No abdominal pain    /71   Wt 132 kg (292 lb)   LMP 2021 (Exact Date)   BMI 48.59 kg/m²   Exam - See flow sheet    Fetal heart rate is normal    Assessment-  Diagnoses and all orders for this visit:    Obesity affecting pregnancy in third trimester    History of stillbirth    History of herpes genitalis    32 weeks gestation of pregnancy    Need for Tdap vaccination      BPP today is 8 out of 8. She was counseled on recommendations for Tdap and desires. She will follow-up weekly for biophysical profiles and see me back in 2 weeks.

## 2022-02-09 ENCOUNTER — TELEPHONE (OUTPATIENT)
Dept: OBSTETRICS AND GYNECOLOGY | Facility: HOSPITAL | Age: 27
End: 2022-02-09

## 2022-02-09 ENCOUNTER — TELEPHONE (OUTPATIENT)
Dept: OBSTETRICS AND GYNECOLOGY | Facility: CLINIC | Age: 27
End: 2022-02-09

## 2022-02-09 RX ORDER — PROMETHAZINE HYDROCHLORIDE 25 MG/1
25 TABLET ORAL EVERY 6 HOURS PRN
Qty: 30 TABLET | Refills: 2 | Status: SHIPPED | OUTPATIENT
Start: 2022-02-09 | End: 2022-04-01 | Stop reason: HOSPADM

## 2022-02-09 NOTE — TELEPHONE ENCOUNTER
Maternal Child Initial Intake    Patient Name: Krishna Clarke     Preferred Name: Krishna     YOB: 1995     Patient E-mail Address: no change                Patient gives permission for information/resources to be emailed: no    Currently Employed: Yes, describe: still working and going to school     How well do you speak English? Very Well     Services: No    Language Spoken/Preferred English    Willingness to participate in Project H[employer]: yes    Home Phone: none  Cell Phone: [phone]  Alternate/Work/School Phone: none  Best Time for Contacting: mornings   Best Method for Contacting: Cell  May we contact you by phone: yes  Motherhood Connection Check-In      Providers:     Type of Provider Name Next Appointment   OB/GYN [place]     Primary Care Provider     Dental Provider On [address]  Teeth cleaned 2 wks    PEDS Provider Dr. Enrique Santoyo on [address]    Speciality Provider       Frequency of OB/GYN Provider Visits: [x] Every 2 wks  [] wkly [] twice/wk [] Every 4 weeks [] other:     Able to keep appts as scheduled: Yes    Questions regarding prenatal visits or tests to be ordered: no    Other Providers/Services Presently Utilizing: WIC (Women, Infant, Children)    Case Management    Have you seen a dentist in last 6 months: yes  OB/GYN Status    Significant Other/Support Person: Name:                     Relationship:     SULY 4/4/2022        Based Upon US    GA: 32+2     Number of Fetuses: 1    Currently Employed: Yes, describe: [employer]      Patient's last menstrual period was 06/28/2021 (exact date).  Estimated Date of Delivery: 4/4/22     Gender(s) & Name(s): Baby GirlLoree  Currently Breastfeeding: no  Baby active/feeling fetal movement: Yes  How are you presently feeling: good     New Diagnosis (Also update in HX) [] Hyperemesis [] HTN (Type: [] Chronic [] Gestational) [] Pre-eclampsia [] GDM  [] PTL [x] Anxiety [] Depression [] Anemia [] STI  [] Other  What questions can I help to answer such as questions related to your care experience, your pregnancy, plans for delivery, any concerns, etc.: none    Delivery Plans:  Method:  [x] Vaginal  []  [] TOLAC   Anesthesia: [] None [] Epidural [] Spinal [] Other :    Special Considerations: [] Birth Plan []  [] Birthing Ball [] Peanut Ball [] Other:      Support Person(s): Adalid MABRY     Post-Delivery Plans:   Pediatric Provider Chosen: Yes, describe: Garth Elizabeth  Adoption: no Circumcision for Male Baby: no  Feeding Intentions: [] Breast Milk [] Formula [] Breast Milk and Formula  Own a Breast Pump: [] No [] Manual [] Electric (Type/Brand:                   )  Birth Control: [] Undecided [] Tubal Ligation (Discussed w/ Provider and Consent Signed: [] Yes [] No  [] Other Planned Method of Birth Control    Immunizations:  Influenza: [] Not Yet [] Refused [] Yes        Tdap: [] Not Yet [] Refused [] Yes  Covid [] Not Yet [] Refused [] Yes    Review for Changes in Social History  Social History:  Smoking Status: Never a smoker.  Cigarettes  Passive Exposure: no  Counseling Given: no  Smokeless Tobacco: Never a smoker.  Alcohol Use: No   Drinks/wk: 0   Substance Use History: No  Substances Used & Use/Wk: 0    [facility] Domestic Violence/Abuse Screening    Within the last year, have you been:  Humiliated or emotionally abused in other ways by your partner or ex-partner no  Afraid of your partner or ex-partner no  Raped or forced to have any kind of sexual activity by your partner or ex-partner no  Kicked, hit, slapped, or otherwise physically hurt by your partner or ex-partner no  Feels Unsafe at home or work/school: no  Feels Threatened by Someone no  Does anyone try to keep you from having contact with others/doing things outside your home: no  History of physical, mental, emotional, financial abuse/neglect: no    [place]  Depression Scale    Over the last 7 days:  I have been able to  laugh and see the funny side of things: 1= Not quite so much now  I have looked forward with enjoyment to things: 0= As much as I ever did  I have blamed myself unnecessarily when things went wron= Not very often  I have been anxious or worried for no good reason: 1= Hardly ever  I have felt scared or panicky for no good reason: 1= No, not much  Things have been getting on top of me: 2= Yes, sometimes I haven't been coping as well as usual  I have been so unhappy that I have had difficulty sleepin= Yes, quiet often   I have felt sad or miserable: 1= Not very often  I have been so unhappy that I have been cryin= Only occasionally   The thought of harming myself has occurred to me: 0= Never  Total Score: 10    Spiritual, Cultural, Hinduism, Value Awareness  Any Spiritual, Cultural, or Hinduism Beliefs/Practices/Values that we need to be mindful of throughout your maternity experience: no  Supplies ready for baby: [x] Car Seat [x] Crib [] Clothing [] Diapers [] Feeding Supplies  Resource/Environmental Concerns: None    Disability/Function  Disabilities (hearing, seeing, concentrating, communicating, comprehension, performing activities of daily living): none  Accomodations/Assistive Devices Utililzed (e.g. hearing aids, sign language, braile, service animal, /aide, etc.): none  Equipment Presently Utilized at Home: [] Breast Pump [] Glucometer [] Blood Pressure Cuff [] Other:     Preparing for Labor and Baby Education    Completed Childbirth Education Classes: [] No/Declined [] Yes [x] Requests more information  Lactation Education Classes: [] No/Declined [] Yes [x] Requests more information  [] No/Declined [x] Actively Participating [] Requests more information [] Other    Topic Educational Information Comments   Fetal Movement [x] Provided   []Acknowledged [] Refused    Choosing a Pediatrician [] Provided   [x]Acknowledged [] Refused    Community Resources [x] Provided   []Acknowledged []  Refused    Hospital Education Programs [x] Provided   []Acknowledged [] Refused    [facility] Maternal-Child Department [x] Provided   []Acknowledged [] Refused    Signs or Symptoms to Report [x] Provided   []Acknowledged [] Refused    Other: [] Provided   []Acknowledged [] Refused    Other:  [] Provided   []Acknowledged [] Refused    Comments:  [] Provided   []Acknowledged [] Refused Number to member services and packing list      Do you have the [employer] Mervat?  [x] YES   [] NO     [employer]: [x] YES   [] NO     Specific Resources Provided and Method: Pt sent info on Packing List and Insurance Benefits   Sent via: [employer]    Intake Summary  [x] Periodic Check completed, will follow-up with patient:  March 9, 2022 @ 0830  [] Discussed community resources and information provided or assisted with appointments (see notes)  [] Other, including any provider notifications (see notes)  [] Declines further Project Stork participation (see notes)  Carissa Humphrey, APRN   Maternal Nurse Navigator

## 2022-02-09 NOTE — TELEPHONE ENCOUNTER
Patient is requesting a refill on Rx - promethazine (PHENERGAN) 25 MG tablet [0622]    If possible by provider.     Please Advise,   Thank you     Pharmacy confirmed - [id number] - [place], [place] - [facility]- [address] AT [facility] [address] - [phone]  - [phone] FX

## 2022-02-25 ENCOUNTER — ROUTINE PRENATAL (OUTPATIENT)
Dept: OBSTETRICS AND GYNECOLOGY | Facility: CLINIC | Age: 27
End: 2022-02-25

## 2022-02-25 VITALS — SYSTOLIC BLOOD PRESSURE: 98 MMHG | WEIGHT: 293 LBS | BODY MASS INDEX: 48.92 KG/M2 | DIASTOLIC BLOOD PRESSURE: 68 MMHG

## 2022-02-25 DIAGNOSIS — Z3A.34 34 WEEKS GESTATION OF PREGNANCY: ICD-10-CM

## 2022-02-25 DIAGNOSIS — Z87.59 HISTORY OF STILLBIRTH: ICD-10-CM

## 2022-02-25 DIAGNOSIS — O99.213 OBESITY AFFECTING PREGNANCY IN THIRD TRIMESTER: Primary | ICD-10-CM

## 2022-02-25 LAB
GLUCOSE UR STRIP-MCNC: NEGATIVE MG/DL
PROT UR STRIP-MCNC: NEGATIVE MG/DL

## 2022-02-25 PROCEDURE — 99213 OFFICE O/P EST LOW 20 MIN: CPT | Performed by: OBSTETRICS & GYNECOLOGY

## 2022-02-25 NOTE — PROGRESS NOTES
Chief Complaint   Patient presents with   • Routine Prenatal Visit     HPI- Pt is 26 y.o.  at 34w4d here for prenatal visit.  She is doing well.  She reports good fetal movement.  She does complain of some pelvic pain.    ROS-     - No vaginal bleeding    GI- No abdominal pain    BP 98/68   Wt 133 kg (294 lb)   LMP 2021 (Exact Date)   BMI 48.92 kg/m²   Exam - See flow sheet    Fetal heart rate is normal    Assessment-  Diagnoses and all orders for this visit:    Obesity affecting pregnancy in third trimester    History of stillbirth    34 weeks gestation of pregnancy    Other orders  -     POC Urinalysis Dipstick     testing remains reassuring and growth ultrasound was done today and growth is reassuring.  She continues to do well with no major issues and will continue with weekly BPP's.  She will see me back in 2 weeks.

## 2022-03-08 ENCOUNTER — REFERRAL TRIAGE (OUTPATIENT)
Dept: LABOR AND DELIVERY | Facility: HOSPITAL | Age: 27
End: 2022-03-08

## 2022-03-09 ENCOUNTER — PATIENT OUTREACH (OUTPATIENT)
Dept: LABOR AND DELIVERY | Facility: HOSPITAL | Age: 27
End: 2022-03-09

## 2022-03-09 NOTE — OUTREACH NOTE
Motherhood Connection  Check-In    Questions/Answers    Flowsheet Row Responses   Best Method for Contacting Cell   Demographics Reviewed Yes   Currently Employed Yes   Able to keep appointments as scheduled Yes   Gender(s) and Name(s) Female, Loree   Baby Active/Feeling Fetal Movemen Yes   How are you presently feeling? tired, emotional, legs crampy   Questions regarding prenatal visits or tests to be ordered? No   Special Considerations Birth Plan   Resource/Environmental Concerns Environmental   Do you have any questions related to your care experience, your pregnancy, plans for delivery, any concerns, etc? No          Will call pt again in one wk to check progress    ELIZABETH Graf  Maternity Nurse Navigator    3/9/2022, 10:19 EST

## 2022-03-11 ENCOUNTER — ROUTINE PRENATAL (OUTPATIENT)
Dept: OBSTETRICS AND GYNECOLOGY | Facility: CLINIC | Age: 27
End: 2022-03-11

## 2022-03-11 VITALS — BODY MASS INDEX: 49.09 KG/M2 | SYSTOLIC BLOOD PRESSURE: 110 MMHG | WEIGHT: 293 LBS | DIASTOLIC BLOOD PRESSURE: 88 MMHG

## 2022-03-11 DIAGNOSIS — Z86.19 HISTORY OF HERPES GENITALIS: ICD-10-CM

## 2022-03-11 DIAGNOSIS — Z36.85 ANTENATAL SCREENING FOR STREPTOCOCCUS B: ICD-10-CM

## 2022-03-11 DIAGNOSIS — O99.213 OBESITY AFFECTING PREGNANCY IN THIRD TRIMESTER: Primary | ICD-10-CM

## 2022-03-11 DIAGNOSIS — Z3A.36 36 WEEKS GESTATION OF PREGNANCY: ICD-10-CM

## 2022-03-11 LAB
GLUCOSE UR STRIP-MCNC: NEGATIVE MG/DL
PROT UR STRIP-MCNC: NEGATIVE MG/DL

## 2022-03-11 PROCEDURE — 99214 OFFICE O/P EST MOD 30 MIN: CPT | Performed by: OBSTETRICS & GYNECOLOGY

## 2022-03-11 RX ORDER — VALACYCLOVIR HYDROCHLORIDE 500 MG/1
500 TABLET, FILM COATED ORAL 2 TIMES DAILY
Qty: 90 TABLET | Refills: 0 | Status: SHIPPED | OUTPATIENT
Start: 2022-03-11 | End: 2022-04-10

## 2022-03-11 NOTE — PROGRESS NOTES
Chief Complaint   Patient presents with   • Routine Prenatal Visit     HPI- Pt is 26 y.o.  at 36w4d here for prenatal visit.  Patient complains of leg cramps.  She reports lots of fetal movement.    ROS-     - No vaginal bleeding    GI- No abdominal pain    /88   Wt 134 kg (295 lb)   LMP 2021 (Exact Date)   BMI 49.09 kg/m²   Exam - See flow sheet    Fetal heart rate is normal    Assessment-  Diagnoses and all orders for this visit:    Obesity affecting pregnancy in third trimester    History of herpes genitalis  -     valACYclovir (Valtrex) 500 MG tablet; Take 1 tablet by mouth 2 (Two) Times a Day for 30 days.     screening for streptococcus B  -     Group B Streptococcus Culture - Swab, Vaginal/Rectum    36 weeks gestation of pregnancy    Breech presentation, single or unspecified fetus    Other orders  -     POC Urinalysis Dipstick      Baby is in the breech presentation today and had been vertex on previous ultrasounds.  We will reevaluate position next week and can potentially discuss ECV.  Prescription was sent for suppressive Valtrex and GBS culture was obtained.  She will follow-up weekly with BPP's.

## 2022-03-15 LAB — B-HEM STREP SPEC QL CULT: POSITIVE

## 2022-03-16 PROBLEM — B95.1 POSITIVE GBS TEST: Status: ACTIVE | Noted: 2022-03-16

## 2022-03-17 ENCOUNTER — PATIENT OUTREACH (OUTPATIENT)
Dept: LABOR AND DELIVERY | Facility: HOSPITAL | Age: 27
End: 2022-03-17

## 2022-03-17 NOTE — PATIENT INSTRUCTIONS
Please see the information that I attached.  Let me know if you have further questions or concerns.  Carissa

## 2022-03-17 NOTE — OUTREACH NOTE
Motherhood Connection  Check-In    Questions/Answers    Flowsheet Row Responses   Best Method for Contacting Cell   Demographics Reviewed Yes   Currently Employed Yes   Able to keep appointments as scheduled Yes   Gender(s) and Name(s) Female, Loree   Baby Active/Feeling Fetal Movemen Yes   How are you presently feeling? tired, left foot swelling   Questions regarding prenatal visits or tests to be ordered? No   New Diagnosis Other   Other New Diagnosis Breech presentation   Education related to new diagnoses/home equipment Yes   Additional Info sent via Patient Instructions   Supplies ready for baby Breast Pump, Clothing, Crib, Diapers, Feeding Supplies   Resource/Environmental Concerns None   Do you have any questions related to your care experience, your pregnancy, plans for delivery, any concerns, etc? Yes   Question breech               ELIZABETH Graf  Maternity Nurse Navigator    3/17/2022, 09:09 EDT

## 2022-03-18 ENCOUNTER — ROUTINE PRENATAL (OUTPATIENT)
Dept: OBSTETRICS AND GYNECOLOGY | Facility: CLINIC | Age: 27
End: 2022-03-18

## 2022-03-18 VITALS — DIASTOLIC BLOOD PRESSURE: 67 MMHG | WEIGHT: 293 LBS | SYSTOLIC BLOOD PRESSURE: 93 MMHG | BODY MASS INDEX: 49.42 KG/M2

## 2022-03-18 DIAGNOSIS — Z3A.37 37 WEEKS GESTATION OF PREGNANCY: ICD-10-CM

## 2022-03-18 DIAGNOSIS — O99.213 OBESITY AFFECTING PREGNANCY IN THIRD TRIMESTER: Primary | ICD-10-CM

## 2022-03-18 DIAGNOSIS — Z87.59 HISTORY OF STILLBIRTH: ICD-10-CM

## 2022-03-18 PROCEDURE — 99213 OFFICE O/P EST LOW 20 MIN: CPT | Performed by: OBSTETRICS & GYNECOLOGY

## 2022-03-18 NOTE — PROGRESS NOTES
Chief Complaint   Patient presents with   • Routine Prenatal Visit     HPI- Pt is 26 y.o.  at 37w4d here for prenatal visit.  Patient complains of generalized discomfort.  She reports good fetal movement.  She denies any contractions or leaking of fluid.    ROS-     - No vaginal bleeding    GI- No abdominal pain    BP 93/67   Wt 135 kg (297 lb)   LMP 2021 (Exact Date)   BMI 49.42 kg/m²   Exam - See flow sheet    Fetal heart rate is normal    Assessment-  Diagnoses and all orders for this visit:    Obesity affecting pregnancy in third trimester    History of stillbirth    37 weeks gestation of pregnancy    BPP is 8 out of 8.  Her baby is vertex on ultrasound today.  She will follow-up next week and if undelivered, we will schedule an induction at 39+ weeks.

## 2022-03-23 ENCOUNTER — HOSPITAL ENCOUNTER (OUTPATIENT)
Facility: HOSPITAL | Age: 27
Discharge: HOME | End: 2022-03-23
Attending: OBSTETRICS & GYNECOLOGY | Admitting: OBSTETRICS & GYNECOLOGY

## 2022-03-23 ENCOUNTER — TELEPHONE (OUTPATIENT)
Dept: OBSTETRICS AND GYNECOLOGY | Facility: CLINIC | Age: 27
End: 2022-03-23

## 2022-03-23 VITALS
DIASTOLIC BLOOD PRESSURE: 63 MMHG | SYSTOLIC BLOOD PRESSURE: 111 MMHG | HEART RATE: 96 BPM | TEMPERATURE: 98.2 F | RESPIRATION RATE: 16 BRPM

## 2022-03-23 PROCEDURE — 59025 FETAL NON-STRESS TEST: CPT

## 2022-03-23 PROCEDURE — G0463 HOSPITAL OUTPT CLINIC VISIT: HCPCS

## 2022-03-23 NOTE — NURSING NOTE
RN is worried about pt's well being.  She is distraught over the loss of her sone on Monday.  Pt does not want to go home, and has just been driving around.    Pts significant other wishes to cremate her son and she does not. Pt is very teary, soft spoken and pleasant.  Pt staying with her aunt and sounds like her other children are not in her custody due to pending investigation of her son's death.

## 2022-03-23 NOTE — TELEPHONE ENCOUNTER
"Bethany Orta pt 38W2D called. Is in distress as she lost her son recently. She just has lost her mucous plug. She is having a pelvic pain and back pain. She is unsure if she is having contractions. There is no bleeding, but she said baby \"just stopped moving\". Please advise.  Thank you,  Emmie"

## 2022-03-23 NOTE — NURSING NOTE
Pt to keep appt with Dr. Sims on Friday. RN discussed fetal kick counts and when and how to perform.  RN encourages pt to return with any concerns at all.  Also, discussed s/s of labor.  Pt understands and is agreeable.

## 2022-03-23 NOTE — TELEPHONE ENCOUNTER
I called to speak with Krishna. She is crying and can barely talk. She has a history of a fetal demise at 27 weeks pregnant and states her baby girl just stopped moving. She has lost her mucous plug and is having pelvic and back pain. I told her she should go to [facility] L&D for decreased fetal movement and check for labor.    I called and spoke with JAMEY Preciado in L&D. I told her that Krishna is coming from home. , 38wk2d, Dr Sims pt, weight 297. She has stopped feeling the baby move and has a history of a fetal demise at 27 weeks in 2017. She was 1cm/30% on 3/18. She has lost her mucous plug and having back pain and pelvic pain.

## 2022-03-23 NOTE — TELEPHONE ENCOUNTER
Phone call. 38wk 2d. Decreased fetal movement. Lost mucus plug, back and pelvic pain. Patient is crying so hard I can barely understand her - she lost her son - fetal demise 27 wks in 2017. I called L&D to let them know she is on her way. Thank you
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
## 2022-03-24 ENCOUNTER — TELEPHONE (OUTPATIENT)
Dept: LABOR AND DELIVERY | Facility: HOSPITAL | Age: 27
End: 2022-03-24
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
## 2022-03-25 ENCOUNTER — ROUTINE PRENATAL (OUTPATIENT)
Dept: OBSTETRICS AND GYNECOLOGY | Facility: CLINIC | Age: 27
End: 2022-03-25

## 2022-03-25 VITALS — WEIGHT: 291 LBS | DIASTOLIC BLOOD PRESSURE: 74 MMHG | BODY MASS INDEX: 48.42 KG/M2 | SYSTOLIC BLOOD PRESSURE: 119 MMHG

## 2022-03-25 DIAGNOSIS — Z3A.38 38 WEEKS GESTATION OF PREGNANCY: ICD-10-CM

## 2022-03-25 DIAGNOSIS — O99.213 OBESITY AFFECTING PREGNANCY IN THIRD TRIMESTER: Primary | ICD-10-CM

## 2022-03-25 DIAGNOSIS — Z87.59 HISTORY OF STILLBIRTH: ICD-10-CM

## 2022-03-25 LAB
GLUCOSE UR STRIP-MCNC: NEGATIVE MG/DL
PROT UR STRIP-MCNC: NEGATIVE MG/DL

## 2022-03-25 PROCEDURE — 99213 OFFICE O/P EST LOW 20 MIN: CPT | Performed by: OBSTETRICS & GYNECOLOGY
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
## 2022-03-29 DIAGNOSIS — Z87.59 HISTORY OF STILLBIRTH: Primary | ICD-10-CM

## 2022-03-29 RX ORDER — CARBOPROST TROMETHAMINE 250 UG/ML
250 INJECTION, SOLUTION INTRAMUSCULAR
Status: CANCELLED | OUTPATIENT
Start: 2022-03-29

## 2022-03-29 RX ORDER — ONDANSETRON 4 MG/1
4 TABLET, FILM COATED ORAL EVERY 6 HOURS PRN
Status: CANCELLED | OUTPATIENT
Start: 2022-03-29

## 2022-03-29 RX ORDER — MAGNESIUM CARB/ALUMINUM HYDROX 105-160MG
30 TABLET,CHEWABLE ORAL ONCE
Status: CANCELLED | OUTPATIENT
Start: 2022-03-29 | End: 2022-03-29

## 2022-03-29 RX ORDER — ONDANSETRON 2 MG/ML
4 INJECTION INTRAMUSCULAR; INTRAVENOUS EVERY 6 HOURS PRN
Status: CANCELLED | OUTPATIENT
Start: 2022-03-29

## 2022-03-29 RX ORDER — OXYTOCIN 10 [USP'U]/ML
125 INJECTION, SOLUTION INTRAMUSCULAR; INTRAVENOUS CONTINUOUS PRN
Status: CANCELLED | OUTPATIENT
Start: 2022-03-29

## 2022-03-29 RX ORDER — MISOPROSTOL 100 UG/1
800 TABLET ORAL ONCE AS NEEDED
Status: CANCELLED | OUTPATIENT
Start: 2022-03-29

## 2022-03-29 RX ORDER — LIDOCAINE HYDROCHLORIDE 10 MG/ML
5 INJECTION, SOLUTION EPIDURAL; INFILTRATION; INTRACAUDAL; PERINEURAL AS NEEDED
Status: CANCELLED | OUTPATIENT
Start: 2022-03-29

## 2022-03-29 RX ORDER — OXYTOCIN 10 [USP'U]/ML
999 INJECTION, SOLUTION INTRAMUSCULAR; INTRAVENOUS ONCE
Status: CANCELLED | OUTPATIENT
Start: 2022-03-29

## 2022-03-29 RX ORDER — OXYTOCIN 10 [USP'U]/ML
250 INJECTION, SOLUTION INTRAMUSCULAR; INTRAVENOUS CONTINUOUS PRN
Status: CANCELLED | OUTPATIENT
Start: 2022-03-29 | End: 2022-03-29

## 2022-03-29 RX ORDER — SODIUM CHLORIDE 0.9 % (FLUSH) 0.9 %
10 SYRINGE (ML) INJECTION EVERY 12 HOURS SCHEDULED
Status: CANCELLED | OUTPATIENT
Start: 2022-03-29

## 2022-03-29 RX ORDER — SODIUM CHLORIDE, SODIUM LACTATE, POTASSIUM CHLORIDE, CALCIUM CHLORIDE 600; 310; 30; 20 MG/100ML; MG/100ML; MG/100ML; MG/100ML
125 INJECTION, SOLUTION INTRAVENOUS CONTINUOUS
Status: CANCELLED | OUTPATIENT
Start: 2022-03-29

## 2022-03-29 RX ORDER — ACETAMINOPHEN 325 MG/1
650 TABLET ORAL EVERY 4 HOURS PRN
Status: CANCELLED | OUTPATIENT
Start: 2022-03-29

## 2022-03-29 RX ORDER — SODIUM CHLORIDE 0.9 % (FLUSH) 0.9 %
10 SYRINGE (ML) INJECTION AS NEEDED
Status: CANCELLED | OUTPATIENT
Start: 2022-03-29

## 2022-03-29 RX ORDER — METHYLERGONOVINE MALEATE 0.2 MG/ML
200 INJECTION INTRAVENOUS ONCE AS NEEDED
Status: CANCELLED | OUTPATIENT
Start: 2022-03-29

## 2022-03-29 RX ORDER — OXYTOCIN 10 [USP'U]/ML
2-20 INJECTION, SOLUTION INTRAMUSCULAR; INTRAVENOUS
Status: CANCELLED | OUTPATIENT
Start: 2022-03-29

## 2022-03-30 ENCOUNTER — ANESTHESIA EVENT (OUTPATIENT)
Dept: LABOR AND DELIVERY | Facility: HOSPITAL | Age: 27
End: 2022-03-30

## 2022-03-30 ENCOUNTER — HOSPITAL ENCOUNTER (INPATIENT)
Dept: LABOR AND DELIVERY | Facility: HOSPITAL | Age: 27
Discharge: HOME | End: 2022-03-30
Attending: OBSTETRICS & GYNECOLOGY

## 2022-03-30 ENCOUNTER — HOSPITAL ENCOUNTER (INPATIENT)
Facility: HOSPITAL | Age: 27
LOS: 2 days | Discharge: HOME | End: 2022-04-01
Attending: OBSTETRICS & GYNECOLOGY | Admitting: OBSTETRICS & GYNECOLOGY

## 2022-03-30 ENCOUNTER — ANESTHESIA (OUTPATIENT)
Dept: LABOR AND DELIVERY | Facility: HOSPITAL | Age: 27
End: 2022-03-30

## 2022-03-30 ENCOUNTER — PATIENT OUTREACH (OUTPATIENT)
Dept: LABOR AND DELIVERY | Facility: HOSPITAL | Age: 27
End: 2022-03-30

## 2022-03-30 DIAGNOSIS — Z37.9 VACUUM-ASSISTED VAGINAL DELIVERY: Primary | ICD-10-CM

## 2022-03-30 DIAGNOSIS — Z87.59 HISTORY OF STILLBIRTH: ICD-10-CM

## 2022-03-30 PROBLEM — O09.299 PREVIOUS STILLBIRTH OR DEMISE, ANTEPARTUM: Status: ACTIVE | Noted: 2022-03-30

## 2022-03-30 LAB
ABO GROUP BLD: NORMAL
AMPHET+METHAMPHET UR QL: NEGATIVE
ATMOSPHERIC PRESS: 739.2 MMHG
BARBITURATES UR QL SCN: NEGATIVE
BASE EXCESS BLDCOA CALC-SCNC: -3.4 MMOL/L
BDY SITE: ABNORMAL
BENZODIAZ UR QL SCN: NEGATIVE
BLD GP AB SCN SERPL QL: NEGATIVE
CANNABINOIDS SERPL QL: NEGATIVE
COCAINE UR QL: NEGATIVE
DEPRECATED RDW RBC AUTO: 42.4 FL (ref 37–54)
ERYTHROCYTE [DISTWIDTH] IN BLOOD BY AUTOMATED COUNT: 13.5 % (ref 12.3–15.4)
HCO3 BLDCOA-SCNC: 22.9 MMOL/L (ref 22–28)
HCT VFR BLD AUTO: 31.4 % (ref 34–46.6)
HGB BLD-MCNC: 10.1 G/DL (ref 12–15.9)
INHALED O2 CONCENTRATION: 21 %
MCH RBC QN AUTO: 28.1 PG (ref 26.6–33)
MCHC RBC AUTO-ENTMCNC: 32.2 G/DL (ref 31.5–35.7)
MCV RBC AUTO: 87.2 FL (ref 79–97)
METHADONE UR QL SCN: NEGATIVE
MODALITY: ABNORMAL
NOTE: ABNORMAL
OPIATES UR QL: NEGATIVE
OXYCODONE UR QL SCN: NEGATIVE
PCO2 BLDCOA: 44.9 MMHG (ref 43–63)
PH BLDCOA: 7.32 PH UNITS (ref 7.18–7.34)
PLATELET # BLD AUTO: 213 10*3/MM3 (ref 140–450)
PMV BLD AUTO: 10.8 FL (ref 6–12)
PO2 BLDCOA: 40.5 MMHG (ref 12–26)
RBC # BLD AUTO: 3.6 10*6/MM3 (ref 3.77–5.28)
RH BLD: POSITIVE
SAO2 % BLDCOA: 70.8 % (ref 92–99)
SARS-COV-2 RNA PNL SPEC NAA+PROBE: NOT DETECTED
T&S EXPIRATION DATE: NORMAL
VENTILATOR MODE: ABNORMAL
WBC NRBC COR # BLD: 9.09 10*3/MM3 (ref 3.4–10.8)

## 2022-03-30 PROCEDURE — 80307 DRUG TEST PRSMV CHEM ANLYZR: CPT | Performed by: OBSTETRICS & GYNECOLOGY

## 2022-03-30 PROCEDURE — 82803 BLOOD GASES ANY COMBINATION: CPT

## 2022-03-30 PROCEDURE — 25010000002 FENTANYL CITRATE (PF) 1000 MCG/20ML SOLUTION: Performed by: STUDENT IN AN ORGANIZED HEALTH CARE EDUCATION/TRAINING PROGRAM

## 2022-03-30 PROCEDURE — 88307 TISSUE EXAM BY PATHOLOGIST: CPT

## 2022-03-30 PROCEDURE — 86901 BLOOD TYPING SEROLOGIC RH(D): CPT | Performed by: OBSTETRICS & GYNECOLOGY

## 2022-03-30 PROCEDURE — 59410 OBSTETRICAL CARE: CPT | Performed by: OBSTETRICS & GYNECOLOGY

## 2022-03-30 PROCEDURE — 86900 BLOOD TYPING SEROLOGIC ABO: CPT | Performed by: OBSTETRICS & GYNECOLOGY

## 2022-03-30 PROCEDURE — 0 PENICILLIN G POTASSIUM PER 600000 UNITS: Performed by: OBSTETRICS & GYNECOLOGY

## 2022-03-30 PROCEDURE — 3E033VJ INTRODUCTION OF OTHER HORMONE INTO PERIPHERAL VEIN, PERCUTANEOUS APPROACH: ICD-10-PCS | Performed by: OBSTETRICS & GYNECOLOGY

## 2022-03-30 PROCEDURE — C1755 CATHETER, INTRASPINAL: HCPCS | Performed by: STUDENT IN AN ORGANIZED HEALTH CARE EDUCATION/TRAINING PROGRAM

## 2022-03-30 PROCEDURE — 25010000002 ROPIVACAINE PER 1 MG: Performed by: STUDENT IN AN ORGANIZED HEALTH CARE EDUCATION/TRAINING PROGRAM

## 2022-03-30 PROCEDURE — 87635 SARS-COV-2 COVID-19 AMP PRB: CPT | Performed by: OBSTETRICS & GYNECOLOGY

## 2022-03-30 PROCEDURE — 25010000002 ONDANSETRON PER 1 MG: Performed by: OBSTETRICS & GYNECOLOGY

## 2022-03-30 PROCEDURE — 25010000002 PENICILLIN G POTASSIUM PER 600000 UNITS: Performed by: OBSTETRICS & GYNECOLOGY

## 2022-03-30 PROCEDURE — 10H07YZ INSERTION OF OTHER DEVICE INTO PRODUCTS OF CONCEPTION, VIA NATURAL OR ARTIFICIAL OPENING: ICD-10-PCS | Performed by: OBSTETRICS & GYNECOLOGY

## 2022-03-30 PROCEDURE — 85027 COMPLETE CBC AUTOMATED: CPT | Performed by: OBSTETRICS & GYNECOLOGY

## 2022-03-30 PROCEDURE — 86850 RBC ANTIBODY SCREEN: CPT | Performed by: OBSTETRICS & GYNECOLOGY

## 2022-03-30 PROCEDURE — 10907ZC DRAINAGE OF AMNIOTIC FLUID, THERAPEUTIC FROM PRODUCTS OF CONCEPTION, VIA NATURAL OR ARTIFICIAL OPENING: ICD-10-PCS | Performed by: OBSTETRICS & GYNECOLOGY

## 2022-03-30 RX ORDER — FAMOTIDINE 10 MG/ML
20 INJECTION, SOLUTION INTRAVENOUS ONCE AS NEEDED
Status: DISCONTINUED | OUTPATIENT
Start: 2022-03-30 | End: 2022-03-31 | Stop reason: HOSPADM

## 2022-03-30 RX ORDER — ONDANSETRON 4 MG/1
4 TABLET, FILM COATED ORAL EVERY 6 HOURS PRN
Status: DISCONTINUED | OUTPATIENT
Start: 2022-03-30 | End: 2022-03-31 | Stop reason: HOSPADM

## 2022-03-30 RX ORDER — PHYTONADIONE 1 MG/.5ML
INJECTION, EMULSION INTRAMUSCULAR; INTRAVENOUS; SUBCUTANEOUS
Status: ACTIVE
Start: 2022-03-30 | End: 2022-03-31

## 2022-03-30 RX ORDER — OXYCODONE HYDROCHLORIDE AND ACETAMINOPHEN 5; 325 MG/1; MG/1
1 TABLET ORAL EVERY 4 HOURS PRN
Status: DISCONTINUED | OUTPATIENT
Start: 2022-03-30 | End: 2022-04-01 | Stop reason: HOSPADM

## 2022-03-30 RX ORDER — OXYTOCIN/0.9 % SODIUM CHLORIDE 30/500 ML
125 PLASTIC BAG, INJECTION (ML) INTRAVENOUS CONTINUOUS PRN
Status: COMPLETED | OUTPATIENT
Start: 2022-03-30 | End: 2022-03-31

## 2022-03-30 RX ORDER — MAGNESIUM CARB/ALUMINUM HYDROX 105-160MG
30 TABLET,CHEWABLE ORAL ONCE
Status: DISCONTINUED | OUTPATIENT
Start: 2022-03-30 | End: 2022-03-31 | Stop reason: HOSPADM

## 2022-03-30 RX ORDER — SODIUM CHLORIDE, SODIUM LACTATE, POTASSIUM CHLORIDE, CALCIUM CHLORIDE 600; 310; 30; 20 MG/100ML; MG/100ML; MG/100ML; MG/100ML
125 INJECTION, SOLUTION INTRAVENOUS CONTINUOUS
Status: DISCONTINUED | OUTPATIENT
Start: 2022-03-30 | End: 2022-03-31

## 2022-03-30 RX ORDER — OXYTOCIN/0.9 % SODIUM CHLORIDE 30/500 ML
250 PLASTIC BAG, INJECTION (ML) INTRAVENOUS CONTINUOUS PRN
Status: ACTIVE | OUTPATIENT
Start: 2022-03-30 | End: 2022-03-30

## 2022-03-30 RX ORDER — SODIUM CHLORIDE 0.9 % (FLUSH) 0.9 %
10 SYRINGE (ML) INJECTION EVERY 12 HOURS SCHEDULED
Status: DISCONTINUED | OUTPATIENT
Start: 2022-03-30 | End: 2022-03-31 | Stop reason: HOSPADM

## 2022-03-30 RX ORDER — CARBOPROST TROMETHAMINE 250 UG/ML
250 INJECTION, SOLUTION INTRAMUSCULAR
Status: DISCONTINUED | OUTPATIENT
Start: 2022-03-30 | End: 2022-03-31 | Stop reason: HOSPADM

## 2022-03-30 RX ORDER — LIDOCAINE HYDROCHLORIDE 10 MG/ML
5 INJECTION, SOLUTION EPIDURAL; INFILTRATION; INTRACAUDAL; PERINEURAL AS NEEDED
Status: DISCONTINUED | OUTPATIENT
Start: 2022-03-30 | End: 2022-03-31 | Stop reason: HOSPADM

## 2022-03-30 RX ORDER — ACETAMINOPHEN 325 MG/1
650 TABLET ORAL EVERY 4 HOURS PRN
Status: DISCONTINUED | OUTPATIENT
Start: 2022-03-30 | End: 2022-03-31 | Stop reason: HOSPADM

## 2022-03-30 RX ORDER — OXYTOCIN/0.9 % SODIUM CHLORIDE 30/500 ML
999 PLASTIC BAG, INJECTION (ML) INTRAVENOUS ONCE
Status: COMPLETED | OUTPATIENT
Start: 2022-03-30 | End: 2022-03-30

## 2022-03-30 RX ORDER — SODIUM CHLORIDE 0.9 % (FLUSH) 0.9 %
10 SYRINGE (ML) INJECTION AS NEEDED
Status: DISCONTINUED | OUTPATIENT
Start: 2022-03-30 | End: 2022-03-31 | Stop reason: HOSPADM

## 2022-03-30 RX ORDER — OXYTOCIN/0.9 % SODIUM CHLORIDE 30/500 ML
2-30 PLASTIC BAG, INJECTION (ML) INTRAVENOUS
Status: DISCONTINUED | OUTPATIENT
Start: 2022-03-30 | End: 2022-03-31 | Stop reason: HOSPADM

## 2022-03-30 RX ORDER — EPHEDRINE SULFATE 50 MG/ML
5 INJECTION, SOLUTION INTRAVENOUS AS NEEDED
Status: DISCONTINUED | OUTPATIENT
Start: 2022-03-30 | End: 2022-03-31 | Stop reason: HOSPADM

## 2022-03-30 RX ORDER — ONDANSETRON 2 MG/ML
4 INJECTION INTRAMUSCULAR; INTRAVENOUS EVERY 6 HOURS PRN
Status: DISCONTINUED | OUTPATIENT
Start: 2022-03-30 | End: 2022-03-31 | Stop reason: HOSPADM

## 2022-03-30 RX ORDER — DIPHENHYDRAMINE HYDROCHLORIDE 50 MG/ML
12.5 INJECTION INTRAMUSCULAR; INTRAVENOUS EVERY 8 HOURS PRN
Status: DISCONTINUED | OUTPATIENT
Start: 2022-03-30 | End: 2022-03-31 | Stop reason: HOSPADM

## 2022-03-30 RX ORDER — METHYLERGONOVINE MALEATE 0.2 MG/ML
200 INJECTION INTRAVENOUS ONCE AS NEEDED
Status: DISCONTINUED | OUTPATIENT
Start: 2022-03-30 | End: 2022-03-31 | Stop reason: HOSPADM

## 2022-03-30 RX ORDER — MISOPROSTOL 200 UG/1
800 TABLET ORAL ONCE AS NEEDED
Status: DISCONTINUED | OUTPATIENT
Start: 2022-03-30 | End: 2022-03-31 | Stop reason: HOSPADM

## 2022-03-30 RX ORDER — ROPIVACAINE HYDROCHLORIDE 2 MG/ML
INJECTION, SOLUTION EPIDURAL; INFILTRATION; PERINEURAL AS NEEDED
Status: DISCONTINUED | OUTPATIENT
Start: 2022-03-30 | End: 2022-03-30 | Stop reason: SURG

## 2022-03-30 RX ORDER — ERYTHROMYCIN 5 MG/G
OINTMENT OPHTHALMIC
Status: ACTIVE
Start: 2022-03-30 | End: 2022-03-31

## 2022-03-30 RX ORDER — PENICILLIN G 3000000 [IU]/50ML
3 INJECTION, SOLUTION INTRAVENOUS EVERY 4 HOURS
Status: DISCONTINUED | OUTPATIENT
Start: 2022-03-30 | End: 2022-03-31 | Stop reason: HOSPADM

## 2022-03-30 RX ORDER — IBUPROFEN 600 MG/1
600 TABLET ORAL EVERY 8 HOURS PRN
Status: DISCONTINUED | OUTPATIENT
Start: 2022-03-30 | End: 2022-04-01 | Stop reason: HOSPADM

## 2022-03-30 RX ORDER — ONDANSETRON 2 MG/ML
4 INJECTION INTRAMUSCULAR; INTRAVENOUS ONCE AS NEEDED
Status: DISCONTINUED | OUTPATIENT
Start: 2022-03-30 | End: 2022-03-31 | Stop reason: HOSPADM

## 2022-03-30 RX ADMIN — PENICILLIN G POTASSIUM 3 MILLION UNITS: 20000000 INJECTION, POWDER, FOR SOLUTION INTRAVENOUS at 17:35

## 2022-03-30 RX ADMIN — SODIUM CHLORIDE, POTASSIUM CHLORIDE, SODIUM LACTATE AND CALCIUM CHLORIDE 125 ML/HR: 600; 310; 30; 20 INJECTION, SOLUTION INTRAVENOUS at 21:23

## 2022-03-30 RX ADMIN — ONDANSETRON 4 MG: 2 INJECTION INTRAMUSCULAR; INTRAVENOUS at 22:28

## 2022-03-30 RX ADMIN — Medication 2 MILLI-UNITS/MIN: at 09:36

## 2022-03-30 RX ADMIN — ROPIVACAINE HYDROCHLORIDE 8 ML: 2 INJECTION, SOLUTION EPIDURAL; INFILTRATION at 14:37

## 2022-03-30 RX ADMIN — PENICILLIN G POTASSIUM 3 MILLION UNITS: 20000000 INJECTION, POWDER, FOR SOLUTION INTRAVENOUS at 13:34

## 2022-03-30 RX ADMIN — Medication 999 ML/HR: at 23:22

## 2022-03-30 RX ADMIN — SODIUM CHLORIDE, POTASSIUM CHLORIDE, SODIUM LACTATE AND CALCIUM CHLORIDE 125 ML/HR: 600; 310; 30; 20 INJECTION, SOLUTION INTRAVENOUS at 15:14

## 2022-03-30 RX ADMIN — PENICILLIN G POTASSIUM 3 MILLION UNITS: 20000000 INJECTION, POWDER, FOR SOLUTION INTRAVENOUS at 21:28

## 2022-03-30 RX ADMIN — EPHEDRINE SULFATE 5 MG: 50 INJECTION INTRAVENOUS at 16:00

## 2022-03-30 RX ADMIN — PENICILLIN G POTASSIUM 5 MILLION UNITS: 5000000 INJECTION, POWDER, FOR SOLUTION INTRAMUSCULAR; INTRAVENOUS at 09:31

## 2022-03-30 RX ADMIN — SODIUM CHLORIDE, POTASSIUM CHLORIDE, SODIUM LACTATE AND CALCIUM CHLORIDE 125 ML/HR: 600; 310; 30; 20 INJECTION, SOLUTION INTRAVENOUS at 09:05

## 2022-03-30 NOTE — ANESTHESIA PREPROCEDURE EVALUATION
Anesthesia Evaluation     Patient summary reviewed and Nursing notes reviewed   no history of anesthetic complications:               Airway   Mallampati: II  TM distance: >3 FB  Neck ROM: full  Dental      Pulmonary    (+) asthma,  Cardiovascular         Neuro/Psych  GI/Hepatic/Renal/Endo    (+) morbid obesity,      Musculoskeletal     Abdominal    Substance History      OB/GYN    (+) Pregnant,         Other                        Anesthesia Plan    ASA 3     epidural   (39w2d)    Anesthetic plan, all risks, benefits, and alternatives have been provided, discussed and informed consent has been obtained with: patient.        CODE STATUS:

## 2022-03-30 NOTE — ANESTHESIA PROCEDURE NOTES
Labor Epidural      Patient reassessed immediately prior to procedure    Patient location during procedure: OB  Start Time: 3/30/2022 2:25 PM  Stop Time: 3/30/2022 2:36 PM  Indication:at surgeon's request  Performed By  Anesthesiologist: Kadeem Melara MD  Preanesthetic Checklist  Completed: patient identified, risks and benefits discussed and timeout performed  Prep:  Pt Position:sitting  Sterile Tech:cap, gloves, mask and sterile barrier  Prep:chlorhexidine gluconate and isopropyl alcohol  Monitoring:blood pressure monitoring, continuous pulse oximetry and EKG  Epidural Block Procedure:  Approach:midline  Guidance:landmark technique and palpation technique  Location:L3-L4  Needle Type:Tuohy  Needle Gauge:17 G  Loss of Resistance Medium: saline  Loss of Resistance: 9cm  Cath Depth at skin:14 cm  Paresthesia: none  Aspiration:negative  Test Dose:negative  Number of Attempts: 1  Post Assessment:  Dressing:occlusive dressing applied and secured with tape  Pt Tolerance:patient tolerated the procedure well with no apparent complications  Complications:no

## 2022-03-30 NOTE — OUTREACH NOTE
Pt in L&D unit for IOL.  Pt sleeping when I entered the room.  Introduced myself in person to the pt and let her know that I knew she was here and if she needed anything to reach out to me.  Pt asked for a sandwich.  Request unfortunately denied at this time due to diet orders per physician.  Pt verbalized understanding.      Reviewed plan of care with RNs in charge of pts labor.

## 2022-03-31 ENCOUNTER — PATIENT OUTREACH (OUTPATIENT)
Dept: LABOR AND DELIVERY | Facility: HOSPITAL | Age: 27
End: 2022-03-31

## 2022-03-31 LAB
BASOPHILS # BLD AUTO: 0.02 10*3/MM3 (ref 0–0.2)
BASOPHILS NFR BLD AUTO: 0.2 % (ref 0–1.5)
BUPRENORPHINE UR QL: NEGATIVE NG/ML
DEPRECATED RDW RBC AUTO: 40.9 FL (ref 37–54)
EOSINOPHIL # BLD AUTO: 0.06 10*3/MM3 (ref 0–0.4)
EOSINOPHIL NFR BLD AUTO: 0.5 % (ref 0.3–6.2)
ERYTHROCYTE [DISTWIDTH] IN BLOOD BY AUTOMATED COUNT: 13.4 % (ref 12.3–15.4)
HCT VFR BLD AUTO: 30 % (ref 34–46.6)
HGB BLD-MCNC: 10.1 G/DL (ref 12–15.9)
IMM GRANULOCYTES # BLD AUTO: 0.06 10*3/MM3 (ref 0–0.05)
IMM GRANULOCYTES NFR BLD AUTO: 0.5 % (ref 0–0.5)
LYMPHOCYTES # BLD AUTO: 1.74 10*3/MM3 (ref 0.7–3.1)
LYMPHOCYTES NFR BLD AUTO: 14.1 % (ref 19.6–45.3)
MCH RBC QN AUTO: 28.6 PG (ref 26.6–33)
MCHC RBC AUTO-ENTMCNC: 33.7 G/DL (ref 31.5–35.7)
MCV RBC AUTO: 85 FL (ref 79–97)
MONOCYTES # BLD AUTO: 0.92 10*3/MM3 (ref 0.1–0.9)
MONOCYTES NFR BLD AUTO: 7.5 % (ref 5–12)
NEUTROPHILS NFR BLD AUTO: 77.2 % (ref 42.7–76)
NEUTROPHILS NFR BLD AUTO: 9.54 10*3/MM3 (ref 1.7–7)
NRBC BLD AUTO-RTO: 0 /100 WBC (ref 0–0.2)
PLATELET # BLD AUTO: 209 10*3/MM3 (ref 140–450)
PMV BLD AUTO: 11.1 FL (ref 6–12)
RBC # BLD AUTO: 3.53 10*6/MM3 (ref 3.77–5.28)
WBC NRBC COR # BLD: 12.34 10*3/MM3 (ref 3.4–10.8)

## 2022-03-31 PROCEDURE — 0503F POSTPARTUM CARE VISIT: CPT | Performed by: OBSTETRICS & GYNECOLOGY

## 2022-03-31 PROCEDURE — 85025 COMPLETE CBC W/AUTO DIFF WBC: CPT | Performed by: OBSTETRICS & GYNECOLOGY

## 2022-03-31 RX ORDER — PROMETHAZINE HYDROCHLORIDE 25 MG/1
25 TABLET ORAL EVERY 6 HOURS PRN
Status: DISCONTINUED | OUTPATIENT
Start: 2022-03-31 | End: 2022-04-01 | Stop reason: HOSPADM

## 2022-03-31 RX ORDER — HYDROCORTISONE 25 MG/G
1 CREAM TOPICAL AS NEEDED
Status: DISCONTINUED | OUTPATIENT
Start: 2022-03-31 | End: 2022-04-01 | Stop reason: HOSPADM

## 2022-03-31 RX ORDER — PRENATAL VIT/IRON FUM/FOLIC AC 27MG-0.8MG
1 TABLET ORAL DAILY
Status: DISCONTINUED | OUTPATIENT
Start: 2022-03-31 | End: 2022-04-01 | Stop reason: HOSPADM

## 2022-03-31 RX ORDER — ONDANSETRON 4 MG/1
4 TABLET, FILM COATED ORAL EVERY 6 HOURS PRN
Status: DISCONTINUED | OUTPATIENT
Start: 2022-03-31 | End: 2022-04-01 | Stop reason: HOSPADM

## 2022-03-31 RX ORDER — BISACODYL 10 MG
10 SUPPOSITORY, RECTAL RECTAL DAILY PRN
Status: DISCONTINUED | OUTPATIENT
Start: 2022-03-31 | End: 2022-04-01 | Stop reason: HOSPADM

## 2022-03-31 RX ORDER — PROMETHAZINE HYDROCHLORIDE 12.5 MG/1
12.5 SUPPOSITORY RECTAL EVERY 6 HOURS PRN
Status: DISCONTINUED | OUTPATIENT
Start: 2022-03-31 | End: 2022-04-01 | Stop reason: HOSPADM

## 2022-03-31 RX ORDER — DOCUSATE SODIUM 100 MG/1
100 CAPSULE, LIQUID FILLED ORAL 2 TIMES DAILY
Status: DISCONTINUED | OUTPATIENT
Start: 2022-03-31 | End: 2022-04-01 | Stop reason: HOSPADM

## 2022-03-31 RX ORDER — OXYCODONE HYDROCHLORIDE AND ACETAMINOPHEN 5; 325 MG/1; MG/1
1 TABLET ORAL EVERY 4 HOURS PRN
Status: DISCONTINUED | OUTPATIENT
Start: 2022-03-31 | End: 2022-04-01 | Stop reason: HOSPADM

## 2022-03-31 RX ORDER — ONDANSETRON 2 MG/ML
4 INJECTION INTRAMUSCULAR; INTRAVENOUS EVERY 6 HOURS PRN
Status: DISCONTINUED | OUTPATIENT
Start: 2022-03-31 | End: 2022-04-01 | Stop reason: HOSPADM

## 2022-03-31 RX ORDER — SODIUM CHLORIDE 0.9 % (FLUSH) 0.9 %
1-10 SYRINGE (ML) INJECTION AS NEEDED
Status: DISCONTINUED | OUTPATIENT
Start: 2022-03-31 | End: 2022-04-01 | Stop reason: HOSPADM

## 2022-03-31 RX ADMIN — Medication 1 APPLICATION: at 15:51

## 2022-03-31 RX ADMIN — Medication 1 TABLET: at 09:07

## 2022-03-31 RX ADMIN — OXYCODONE HYDROCHLORIDE AND ACETAMINOPHEN 1 TABLET: 5; 325 TABLET ORAL at 04:32

## 2022-03-31 RX ADMIN — IBUPROFEN 600 MG: 600 TABLET ORAL at 00:04

## 2022-03-31 RX ADMIN — IBUPROFEN 600 MG: 600 TABLET ORAL at 17:14

## 2022-03-31 RX ADMIN — DOCUSATE SODIUM 100 MG: 100 CAPSULE, LIQUID FILLED ORAL at 09:07

## 2022-03-31 RX ADMIN — Medication 125 ML/HR: at 00:52

## 2022-03-31 RX ADMIN — OXYCODONE HYDROCHLORIDE AND ACETAMINOPHEN 1 TABLET: 5; 325 TABLET ORAL at 20:35

## 2022-03-31 RX ADMIN — IBUPROFEN 600 MG: 600 TABLET ORAL at 09:07

## 2022-03-31 NOTE — ANESTHESIA POSTPROCEDURE EVALUATION
Patient: Krishna Clarke    Procedure Summary     Date: 03/30/22 Room / Location:     Anesthesia Start: 1425 Anesthesia Stop: 2317    Procedure: LABOR ANALGESIA Diagnosis:     Scheduled Providers:  Provider: Kadeem Melara MD    Anesthesia Type: epidural ASA Status: 3          Anesthesia Type: epidural    Vitals  Vitals Value Taken Time   /76 03/31/22 0330   Temp 36.8 °C (98.2 °F) 03/31/22 0330   Pulse 72 03/31/22 0330   Resp 16 03/31/22 0330   SpO2 97 % 03/31/22 0115           Post Anesthesia Care and Evaluation    Anesthetic complications: No anesthetic complications

## 2022-03-31 NOTE — OUTREACH NOTE
[facility]  IP Postpartum    Questions/Answers    Flowsheet Row Responses   Best Method for Contacting Cell   Support Person Present No   Comment pt alone   Does the patient have a car seat at the hospital No   Car Seat Comment pt needs car seat   Delivery Not Reviewed Reviewed   Were birth expectations met? Yes   Is there a need for additional support/resources? Yes   Yes, other reources needed comment pt needs    Lactation Note Reviewed Reviewed   Is additional support needed? Yes   Yes, additional support needed comment pt needs all the help she can get   Any questions or concerns? No   Is the patient going to use Meds to Beds? Yes   Details yes please   Any concerns related discharge meds/ability to  prescriptions? No   OB Discharge Navigator Reviewed  Reviewed   Confirm Postpartum OB appointment No  [pending d/c ]   Confirm initial well-child Pediatrician appointment date/time: No   Additional post-discharge F/U appointments No  [pending d/c]   Does patient have transportation to appointments? No   Will she need a ? No   Any other assistance needed to ensure she is able to attend appointments? Yes   Other assistance needed pt needs more support   Does patient have supplies needed at home for  care? Clothing, Breast Pump, Crib, Diapers, Formula          F/U Tues 2022 afternoon    ELIZABETH Graf  Maternity Nurse Navigator    3/31/2022, 16:27 EDT

## 2022-04-01 VITALS
TEMPERATURE: 98.4 F | OXYGEN SATURATION: 97 % | SYSTOLIC BLOOD PRESSURE: 129 MMHG | DIASTOLIC BLOOD PRESSURE: 80 MMHG | BODY MASS INDEX: 48.42 KG/M2 | HEIGHT: 65 IN | RESPIRATION RATE: 16 BRPM | HEART RATE: 73 BPM

## 2022-04-01 PROCEDURE — 90791 PSYCH DIAGNOSTIC EVALUATION: CPT

## 2022-04-01 PROCEDURE — 0503F POSTPARTUM CARE VISIT: CPT | Performed by: OBSTETRICS & GYNECOLOGY

## 2022-04-01 RX ORDER — OXYCODONE HYDROCHLORIDE AND ACETAMINOPHEN 5; 325 MG/1; MG/1
1 TABLET ORAL EVERY 6 HOURS PRN
Qty: 16 TABLET | Refills: 0 | Status: SHIPPED | OUTPATIENT
Start: 2022-04-01 | End: 2022-05-11

## 2022-04-01 RX ORDER — IBUPROFEN 600 MG/1
600 TABLET ORAL EVERY 8 HOURS PRN
Qty: 30 TABLET | Refills: 0 | Status: SHIPPED | OUTPATIENT
Start: 2022-04-01 | End: 2022-05-11

## 2022-04-01 RX ORDER — PSEUDOEPHEDRINE HCL 30 MG
100 TABLET ORAL 2 TIMES DAILY
Qty: 20 CAPSULE | Refills: 0 | Status: SHIPPED | OUTPATIENT
Start: 2022-04-01 | End: 2022-05-11

## 2022-04-01 RX ADMIN — IBUPROFEN 600 MG: 600 TABLET ORAL at 08:42

## 2022-04-01 RX ADMIN — Medication 1 TABLET: at 08:40

## 2022-04-01 RX ADMIN — DOCUSATE SODIUM 100 MG: 100 CAPSULE, LIQUID FILLED ORAL at 08:40
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
## 2022-04-01 NOTE — PROGRESS NOTES
DAILY PROGRESS NOTE    Patient Identification:  Name: Krishna Clarke  Age: 27 y.o.  Sex: female  :  1995  MRN: [id number]               Subjective:  Interval History: Partum day 2 from a vaginal delivery.  Pain is controlled with pain medication.  Lochia is minimal.  The patient does complain of numbness in both feet, right greater than left.  She reports that this has been present since her epidural and has not improved.  She reports that she is able to walk.  She does not note any weakness.    Objective:    Scheduled Meds:docusate sodium, 100 mg, Oral, BID  prenatal vitamin, 1 tablet, Oral, Daily      Continuous Infusions:   PRN Meds:•  all purpose nipple ointment  •  benzocaine  •  benzocaine-menthol  •  bisacodyl  •  pramoxine-hydrocortisone  •  Hydrocortisone (Perianal)  •  ibuprofen  •  lanolin  •  magnesium hydroxide  •  ondansetron **OR** ondansetron  •  oxyCODONE-acetaminophen  •  oxyCODONE-acetaminophen  •  promethazine **OR** promethazine  •  sodium chloride    Vital signs in last 24 hours:  Temp:  [98.3 °F (36.8 °C)-98.8 °F (37.1 °C)] 98.3 °F (36.8 °C)  Heart Rate:  [67-88] 88  Resp:  [16-18] 16  BP: (111-153)/(68-92) 137/88    Intake/Output:  No intake or output data in the 24 hours ending 22 1209    Exam:  General Appearance:    Alert, cooperative, no distress, appears stated age   Lungs:     Clear to auscultation bilaterally, respirations unlabored    Heart:    Regular rate and rhythm, S1 and S2 normal, no murmur, rub   or gallop   Abdomen:    Soft, nondistended.  The fundus is firm and nontender.  It is below the umbilicus.   Extremities:  Equal in size with 2+ bipedal edema.  The feet are warm and well-perfused.  The patient is able to move her toes on both sides.  She has what appears to be normal motor strength with anteflexion and dorsiflexion of the foot.   Skin:   Skin color, texture, turgor normal, no rashes or lesions        Data Review:    Lab Results (last 24 hours)      Procedure Component Value Units Date/Time    URINE DRUG SCREEN PLUS BUPRENORPHINE - [[id number]] Collected: 03/30/22 0910     Updated: 03/31/22 1809    Narrative:      The following orders were created for panel order URINE DRUG SCREEN PLUS BUPRENORPHINE -.  Procedure                               Abnormality         Status                     ---------                               -----------         ------                     Urine Drug Screen - Urin...[[id number]]  Normal              Final result               Buprenorphine Screen Uri...[[id number]]                      Final result                 Please view results for these tests on the individual orders.    Buprenorphine Screen Urine - Urine, Clean Catch [[id number]] Collected: 03/30/22 0910    Specimen: Urine, Clean Catch Updated: 03/31/22 1809     Buprenorphine, Urine Negative ng/mL     Narrative:      Performed at:  [facility] - [facility]  [address] [place], [place]  [id number]  : Jazmín Benitez PhD, Phone:  [phone]        Assessment:    Vacuum-assisted vaginal delivery    Previous stillbirth or demise, antepartum    Numbness of both feet, right greater than the left.  Motor strength appears to be normal and the patient is able to walk.  I counseled the patient and answered her questions.  We will ask anesthesia to see regarding this.      If the patient is cleared for discharge, I have done discharge counseling.        Yovani Christiansen MD  4/1/2022

## 2022-04-01 NOTE — LACTATION NOTE
This note was copied from a baby's chart.  Mom reports baby is still hungry after nursing, she is supplementing with formula,  she is seeing less milk volume with pumping and her left nipple is feeling better since starting APNO. Discussed milk supply and to pump every 3 hrs, depending on how well baby nurses, to increase her supply, call for any assistance or questions.

## 2022-04-01 NOTE — PLAN OF CARE
Goal Outcome Evaluation:   VSS. Patient has remained in good spirits this shift. Started supplementing with formula tonight. Patient needs frequent reminding to not sleep with baby in bed. APNO cream given for left nipple blister. Not ambulating very much, education provided. Will monitor.

## 2022-04-01 NOTE — DISCHARGE SUMMARY
Date of Discharge:  2022    Discharge Diagnosis: 1.  Intrauterine pregnancy at 39-2/7 weeks, delivered    Presenting Problem/History of Present Illness  Previous stillbirth or demise, antepartum [O09.299]       Hospital Course  Patient is a 27 y.o. female presented at 39 weeks and 2/7 for an induction of labor.  She progressed to vaginal delivery of a vigorous .  For event surrounding delivery, please see delivery note.  The postpartum course was smooth.  By postpartum day 2, the patient was afebrile, tolerating a regular diet and her lochia was minimal.  At this point the patient requested discharge home.  She did, however, complain of numbnessOf her feet.  This has been present since her epidural was placed.  Anesthesia was consulted.And after evaluation it was felt that this was expected to resolve spontaneously.  The patient was cleared for discharge.      Procedures Performed         Consults:   Consults     Date and Time Order Name Status Description    2022 12:13 PM Inpatient Anesthesiology Physician Consult                Condition on Discharge: Stable    Vital Signs  Temp:  [98.3 °F (36.8 °C)-98.4 °F (36.9 °C)] 98.3 °F (36.8 °C)  Heart Rate:  [67-88] 88  Resp:  [16] 16  BP: (122-153)/(77-92) 137/88        Discharge Disposition  Home or Self Care    Discharge Medications     Discharge Medications      New Medications      Instructions Start Date   docusate sodium 100 MG capsule   100 mg, Oral, 2 Times Daily      ibuprofen 600 MG tablet  Commonly known as: ADVIL,MOTRIN   600 mg, Oral, Every 8 Hours PRN      oxyCODONE-acetaminophen 5-325 MG per tablet  Commonly known as: PERCOCET   1 tablet, Oral, Every 6 Hours PRN         Continue These Medications      Instructions Start Date   albuterol (2.5 MG/3ML) 0.083% nebulizer solution  Commonly known as: PROVENTIL   2.5 mg, Nebulization, Every 6 Hours PRN      budesonide-formoterol 80-4.5 MCG/ACT inhaler  Commonly known as: SYMBICORT   2 puffs,  Inhalation      valACYclovir 500 MG tablet  Commonly known as: Valtrex   500 mg, Oral, 2 Times Daily      WesTab Plus 27-1 MG tablet   1 tablet, Oral, Daily         Stop These Medications    promethazine 25 MG tablet  Commonly known as: PHENERGAN            Discharge Diet:     Activity at Discharge:     Follow-up Appointments  No future appointments.  Additional Instructions for the Follow-ups that You Need to Schedule     Call MD With Problems / Concerns   As directed      Instructions: Call for fever, severe abdominal pain, heavy bleeding or any other concerns    Order Comments: Instructions: Call for fever, severe abdominal pain, heavy bleeding or any other concerns          Discharge Follow-up with Specified Provider: Dr Sims; 2 Weeks   As directed      To: Dr Sims    Follow Up: 2 Weeks               Test Results Pending at Discharge       Yovani Christiansen MD  04/01/22  17:08 EDT

## 2022-04-01 NOTE — PROGRESS NOTES
Patient had a vaginal delivery 2 days ago with an uneventful lumbar epidural. She has remaining right foot numbness which is improving. I expect this to resolve in a few days and I advised her as such. She is free to be dischared as far as anesthsia is concerned.

## 2022-04-02 NOTE — PROGRESS NOTES
Continued Stay Note  [facility]     Patient Name: Krishna Clarke  MRN: [id number]  Today's Date: 4/2/2022    Admit Date: 3/30/2022     Discharge Plan     Row Name 04/02/22 1448       Plan    Plan Comments Mother: Krishna Clarke, MRN [id number]. Infant: Raj Clarke, MRN [id number]. CSW was unable to meet with mother prior to discharge. CPS report #[id number] was submitted on 3/21/22 due to mother not having custody of her other children, and recent death of her 22 month old. Per online report status, CPS did NOT accept the report for investigation. TIFFANIE Cummins asked mother for her current CPS worker’s information, and provided that to CSW. Mother’s CPS worker is Vivek Singh, number [phone]. CPS worker Francisco confirmed that mother is approved to take infant home when medically ready. CPS worker stated mother is currently staying with a friend and has a safety plan in place with CPS. CPS worker provided a letter on [facility] letterhead confirming infant’s discharge plan. CSW informed charge TIFFANIE Sheikh about infant’s discharge plan, and provided the letter to her as well. CSW will continue to follow the cord toxicology, and provide the results to CPS worker Francisco once available. ALLEGRA Chamorro               Discharge Codes    No documentation.               Expected Discharge Date and Time     Expected Discharge Date Expected Discharge Time    Apr 1, 2022             ACE Chamorro

## 2022-04-06 ENCOUNTER — PATIENT OUTREACH (OUTPATIENT)
Dept: LABOR AND DELIVERY | Facility: HOSPITAL | Age: 27
End: 2022-04-06

## 2022-04-06 ENCOUNTER — TELEPHONE (OUTPATIENT)
Dept: OBSTETRICS AND GYNECOLOGY | Facility: CLINIC | Age: 27
End: 2022-04-06

## 2022-04-06 NOTE — TELEPHONE ENCOUNTER
If she is having that severe pain of her spine, since she had an epidural she really would need to go to the emergency room at [facility] to get evaluated.  Occasionally they even have to be checked by anesthesia who did the epidural if they think it is an anesthesia problem.  There is really not much we can check in the office so I would have her go to the ER if she is thinking it is that severe or if she has any evidence of fever.

## 2022-04-06 NOTE — OUTREACH NOTE
Motherhood Connection  Postpartum Check-In    Questions/Answers    Flowsheet Row Responses   Visit Setting Telephone   Best Method for Contacting Cell   OB Discharge Note Reviewed  Reviewed   OB Discharge Navigator Reviewed  Reviewed   OB Discharge Medications Reviewed  Reviewed    discharged home with mother? Yes   Current Pain Levels 0-10 7   At Rest Pain Levels 0-10 7   Pain level with activity 0-10 7   Acceptable Pain Level 0-10 7   Pain Location Back   Pain Description Tingling   How do you treat your pain? Medications, Cold, Heat, Position Changes   Verbalized Emotional State Anxiety, Depression, Fear, Grief, Guilt   Family/Support Network Friend   Level of Involvement in Care Interactive, Supportive   Do you feel comfortable in your relationship with your baby? Yes   Have members of your household adjusted to your baby? Yes   Is the baby's father supportive and/or involved with the baby? No   Other home incarceration at his genna's house is what she has heard through the grape vine   How does your partner feel about the baby? Unsure   Do you feel safe at home, school and work? Yes   Are you in a relationship with someone who threatens you or hurts you? No   Do you have the resources to keep yourself and your baby healthy and safe? Yes   Lochia (per patient report) Clots Present, Rubra   Amount Spotting   Number of pads per day 3   Lochia Odor None   Is patient breastfeeding? Yes, pumping   pumping - Left Breast Nontender, Soft   Pumping - Right Breast Nontender, Soft   Pumping - Left Nipple Intact   Pumping - Right Nipple Intact   Frequency q 2-3 hours   Pumping Quantity 4 oz 1 breast   Storage of Milk yes   Postpartum Depression Screening Education Education Provided   Peripheral Blood Glucose Education Provided   Doctor Appointments: Education Provided   Breastfeeding Education Education Provided   Family Planning Education Education Provided   Postpartum Care Education Education Provided   S & S to  report Education Provided   Followup Appointments Made No  [pt advised to call today for an appt asap]   Well Child Visit Appointments Made Yes   Well Child Checkup Provider Name Dr. Bhatti   Well Child Check Up Date: 04/04/22   Did you complete the visit? Yes   Were there any specific concerns? Yes   Concerns: Bilirubin/resolved   Has additional follow-up with pediatrician or specialist been recommended? No   Umbilical Cord No reported signs or symptoms   Was the baby circumcised? No   Feeding Readiness Cues: Eager, Energy for feeding, Finger Sucking   Infant Feeding Method Breast   Feeding Tolerance Adequate pause for breath, Alert for feeding   Latch grasps breast, tongue down, lips flanged, rhythmic sucking   Type of Nipple: everted (after stimulation)   Comfort (breast/nipple) soft, nontender   Hold (positioning staff holds and then mother takes over   Latch Total Score 8   Is a lactation referral indicated? No   Breastfeeding Right   Time breastfeeding left: 15 min   Time breastfeeding right: 15 min   Frequency of feedings 2-3 hours   Number of wet diapers x 24 hours 8   Last BM x 24 hours 3   Emesis (Unmeasured Occurence) 0   What safe sleep surface is available? Pack N Play   Are there stuffed animals, toys, pillows, quilts, blankets, wedges, positioners, bumpers or other loose bedding in the infant's sleeping environment? No   Where does the baby usually sleep? Pack N Play   Does the baby ever share a sleep surface with a sibling, adult or pet? No   Does the baby ever share a sleep surface in a bed, couch, recliner or other? No   What position do you place your baby to sleep for naps? Back   What position do you place your baby to sleep at night Back   Are you and/or other caregivers smoking inside or outside the baby's home? No   Is the infant dressed appropiately for the temperature of the home? Yes          Review of Systems    Told the pt to reach out the office to schedule her pp f/u visit and to let  them know about the symptoms she has been experiencing.  May need to be seen sooner than 6wks.    Pt sent info on Lactation and Counseling.      ELIZABETH Graf  Maternity Nurse Navigator    4/6/2022, 11:47 EDT

## 2022-04-06 NOTE — TELEPHONE ENCOUNTER
Carissa Kam is reaching out wondering if the patient should be seen soon or what should be done for her symptoms she is having postpartum. She is having symptoms of pain from the bottom of her spine to her head which she rates at a 7 and she is seeing stars. She said she has never had these issues with her previous children. Please advise.    Thank you,  Prosper

## 2022-04-22 ENCOUNTER — TELEPHONE (OUTPATIENT)
Dept: OBSTETRICS AND GYNECOLOGY | Facility: CLINIC | Age: 27
End: 2022-04-22

## 2022-04-22 NOTE — TELEPHONE ENCOUNTER
Patient is calling, pp is next week. Patient is having blood in stool, wants to know if she needs to be seen.

## 2022-04-22 NOTE — TELEPHONE ENCOUNTER
She likely has blood in her stool from hemorrhoids.  I would recommend a stool softener to decrease constipation and straining.  If the bleeding is extremely heavy, she would need to be seen in the emergency department.

## 2022-05-02 ENCOUNTER — PATIENT OUTREACH (OUTPATIENT)
Dept: LABOR AND DELIVERY | Facility: HOSPITAL | Age: 27
End: 2022-05-02

## 2022-05-02 NOTE — OUTREACH NOTE
Motherhood Connection  Postpartum Check-In    Questions/Answers    Flowsheet Row Responses   Visit Setting Telephone   Best Method for Contacting Cell   OB Discharge Note Reviewed  Reviewed   OB Discharge Navigator Reviewed  Reviewed   OB Discharge Medications Reviewed  Reviewed   [place] discharged home with mother? Yes   Current Pain Levels 0-10 0   At Rest Pain Levels 0-10 0   Pain level with activity 0-10 0   Acceptable Pain Level 0-10 0   Verbalized Emotional State Anxiety, Depression   Family/Support Network Friend  [pt currently living with a friend and has her location hidden due to the situation]   Level of Involvement in Care Supportive   Do you feel comfortable in your relationship with your baby? Yes   Is the baby's father supportive and/or involved with the baby? No   Other in home incarceration   How does your partner feel about the baby? Unsure   Do you feel safe at home, school and work? Yes   Are you in a relationship with someone who threatens you or hurts you? No  [pt has no contact with FOB]   Do you have the resources to keep yourself and your baby healthy and safe? Yes   Lochia (per patient report) Brown-naina Red, Alba   Amount None   Number of pads per day 2   Lochia Odor Foul Odor  [discussed with pt that it could be infection ]   Is patient breastfeeding? No   How is breast suppression going? pt still has some milk but is not nursing   Postpartum Depression Screening Education Education Provided   Peripheral Blood Glucose Education Provided   Doctor Appointments: Education Provided   Breastfeeding Education Education Provided   Family Planning Education Education Provided   Postpartum Care Education Education Provided   S & S to report Education Provided   Followup Appointments Made Yes   Well Child Visit Appointments Made Yes   Appointment Date 22   Provider/Agency Dr. Sims   Well Child Checkup Provider Name Yovani Bhatti [facility] [address]   Well Child Check Up Date: 22  "  Did you complete the visit? No  [upcoming ]   Do you need to reschedule this appointment? No   Comment upcoming   Umbilical Cord No reported signs or symptoms   Was the baby circumcised? No   Feeding Readiness Cues: Energy for feeding, Crying   Infant Feeding Method Formula   Feeding Tolerance Alert for feeding   Is a lactation referral indicated? No   What safe sleep surface is available? Bassinet   Are there stuffed animals, toys, pillows, quilts, blankets, wedges, positioners, bumpers or other loose bedding in the infant's sleeping environment? No   Where does the baby usually sleep? Bassinet   Does the baby ever share a sleep surface with a sibling, adult or pet? No   Does the baby ever share a sleep surface in a bed, couch, recliner or other? No   What position do you place your baby to sleep for naps? Back   What position do you place your baby to sleep at night Back   Are you and/or other caregivers smoking inside or outside the baby's home? No   Is the infant dressed appropiately for the temperature of the home? Yes   Do you use a clean, dry pacifier that is not attached to a string or stuffed animal? No          Review of Systems    Pt reports that her mother  yesterday.  Pt has been under a significant amount of stress and turmoil prior to her mother's death.  She states, \"It was my mother who was keeping me going.\"  (Pt 2yr old son  just prior to her delivery.  The FOB (current pregnancy), was potentially responsible for the death of her son.  He is currently being held under house arrest awaiting toxicology reports and autopsy.  She currently has supervised custody of her oldest daughter and her .  The friend that she is living with is the supervisor.  Pt not to be left alone with the children at this time.  Discussed with pt the need to see counselor.  Pt states she has a therapist that she sees.  Disc additional counselor and let her know I would send resources.      Pt had c/o last " week of bloody stools.  She saw someone at [facility] who prescribed some medication and the issue has resolved.  She further mentioned her feet which she describes as an outbreak of eczema which has come and gone the majority of her life.   She reports the staff said it was suspicious for strep.  They gave her a topical triamcinolone for her to apply daily.  She states it does not help.  She further states when she pops one of the blisters the rash spreads.  Recommended dermatology.  Pt does not currently have PCP because it is pending.  Once she sees her PCP recommended that she requests referral for derm.  Since that could be months from now, advised that she go to see someone at [facility] to see if there is something else she could do.      Pt further states she has an ulcer in her mouth and needs to see someone for that.  She states this happens every time she is stressed out.  It is so bad the top of her mouth is raw and open and she cannot eat.  Recommended she go see immediate care about this issue as well.      Lastly, she state her discharge is foul.  She states it has never smelled like this before.  Let her know that this symptom could be an indication of infection.  Pt states she has her f/u PP OB visit next week and this can wait until that time.  Encouraged pt that if her signs and symptoms worsen and are accompanied by pain and fever, she should go sooner.  Pt agreed.      As far as Loree, eduardo girl goes, pt reports that she has been to her pediatrician's office several time.  She has an u/s for her hips due to a hip click that Dr. Bhatti noted on exam.  Infant is eating well and had been sleeping well with the exception of the last two nights.      Relinquish care to OB at this time.      ELIZABETH Graf  Maternity Nurse Navigator    5/2/2022, 13:33 EDT

## 2022-05-10 ENCOUNTER — PATIENT OUTREACH (OUTPATIENT)
Dept: CALL CENTER | Facility: HOSPITAL | Age: 27
End: 2022-05-10

## 2022-05-10 NOTE — OUTREACH NOTE
Motherhood Connection Survey    Flowsheet Row Responses   [facility] [place]   [facility] attempt successful? Yes   Call end time 0934   Baby sex Girl   Baby sex Girl   Delivery type Vaginal   Emotional state Acceptance   Family support Yes   Do you have all necessary resources to care for you and your baby?  Yes   Have members of your household adjusted to your baby? Yes   Did you have any problems with pre-eclampsia during this pregnancy? No   Do you have any of the following: No Issues   Did you have blood glucose issues during this pregnancy No   Lochia amount None   Did you have an episiotomy/tear/abdominal incision? No   Feeding Method Bottle   Frequency 3hrs   Amount 6 oz   Breast Condition No   Nipple Condition No   Signs baby is ready to eat Crying   Feeding tolerance Wet/dirty diapers   Number of wet diapers x 24 hours 10   Last BM x 24 hours 1 everyother day   Umbilical Cord No reported signs or symptoms   Where does the baby usually sleep? Bassinet   Are there stuffed animals, toys, pillows, quilts, blankets, wedges, positioners, bumpers or other loose bedding in the infant's sleeping environment? No   Does the baby ever share a sleep surface in a bed, couch, recliner or other? No   What position do you lay your baby down to sleep? Back   Are you and/or other caregivers smoking inside or outside the baby's home? No   Mom appointment comments: 5/11/22   Baby appointment comments: Last week   Additional comments Advised pt to contact peds to discuss baby being constipated.   Call completed? Yes   How satisfied were you with the Motherhood Connection Program? 5   Anyone you would like to recognize from your time in the Motherhood Connection Program Miss Sheikh nurse navigator was amazing.            RAMY QUINONES - Registered Nurse

## 2022-05-11 ENCOUNTER — POSTPARTUM VISIT (OUTPATIENT)
Dept: OBSTETRICS AND GYNECOLOGY | Facility: CLINIC | Age: 27
End: 2022-05-11

## 2022-05-11 VITALS
WEIGHT: 274 LBS | SYSTOLIC BLOOD PRESSURE: 103 MMHG | BODY MASS INDEX: 45.6 KG/M2 | HEART RATE: 80 BPM | DIASTOLIC BLOOD PRESSURE: 72 MMHG

## 2022-05-11 PROBLEM — R10.9 ABDOMINAL PAIN IN PREGNANCY, ANTEPARTUM: Status: RESOLVED | Noted: 2021-10-22 | Resolved: 2022-05-11

## 2022-05-11 PROBLEM — Z37.9 VACUUM-ASSISTED VAGINAL DELIVERY: Status: RESOLVED | Noted: 2022-03-30 | Resolved: 2022-05-11

## 2022-05-11 PROBLEM — M25.559: Status: RESOLVED | Noted: 2021-10-22 | Resolved: 2022-05-11

## 2022-05-11 PROBLEM — O26.892: Status: RESOLVED | Noted: 2021-10-22 | Resolved: 2022-05-11

## 2022-05-11 PROBLEM — O99.213 OBESITY AFFECTING PREGNANCY IN THIRD TRIMESTER: Status: RESOLVED | Noted: 2022-01-10 | Resolved: 2022-05-11

## 2022-05-11 PROBLEM — B95.1 POSITIVE GBS TEST: Status: RESOLVED | Noted: 2022-03-16 | Resolved: 2022-05-11

## 2022-05-11 PROBLEM — O26.899 ABDOMINAL PAIN IN PREGNANCY, ANTEPARTUM: Status: RESOLVED | Noted: 2021-10-22 | Resolved: 2022-05-11

## 2022-05-11 PROCEDURE — 0503F POSTPARTUM CARE VISIT: CPT | Performed by: OBSTETRICS & GYNECOLOGY

## 2022-05-11 NOTE — PROGRESS NOTES
Chief Complaint  Postpartum Care- 6 wk pp exam     Subjective          Krishna Clarke presents to [facility]  History of Present Illness  Patient is a 27-year-old who is 6 weeks status post vaginal delivery at 39 weeks.  Her pregnancy was largely uncomplicated.  She did experience the death of her almost 2-year-old just a couple of weeks prior to delivery and had 2 children removed from her home.  She states that the baby is currently with her and is doing well.  She has no major complaints.  She is in therapy and feels it is helping and denies any need for medication.  She is breast and bottlefeeding.  She is no longer having any vaginal bleeding.  Objective   Vital Signs:  /72   Pulse 80   Wt 124 kg (274 lb)   BMI 45.60 kg/m²           Physical Exam  Vitals reviewed. Exam conducted with a chaperone present.   Constitutional:       Appearance: She is well-developed.   Cardiovascular:      Rate and Rhythm: Normal rate and regular rhythm.   Pulmonary:      Effort: Pulmonary effort is normal.      Breath sounds: Normal breath sounds.   Chest:   Breasts:      Right: No inverted nipple, mass, nipple discharge, skin change or tenderness.      Left: No inverted nipple, mass, nipple discharge, skin change or tenderness.       Abdominal:      General: There is no distension.      Palpations: Abdomen is soft.      Tenderness: There is no abdominal tenderness.   Genitourinary:     Labia:         Right: No rash, tenderness, lesion or injury.         Left: No rash, tenderness, lesion or injury.       Vagina: Normal.      Cervix: Normal.      Uterus: Normal.       Adnexa:         Right: No mass, tenderness or fullness.          Left: No mass, tenderness or fullness.     Neurological:      Mental Status: She is alert.        Result Review :                 Assessment and Plan    Diagnoses and all orders for this visit:    1. Routine postpartum follow-up (Primary)    She is doing well  postpartum and may resume all normal activities with no restrictions.  She was counseled on contraceptive options and would like to proceed with a Mirena IUD.  She will follow-up for IUD insertion.       I spent 20 minutes caring for Krishna on this date of service. This time includes time spent by me in the following activities:obtaining and/or reviewing a separately obtained history, performing a medically appropriate examination and/or evaluation , counseling and educating the patient/family/caregiver and documenting information in the medical record  Follow Up   No follow-ups on file.  Patient was given instructions and counseling regarding her condition or for health maintenance advice. Please see specific information pulled into the AVS if appropriate.

## 2022-05-24 ENCOUNTER — TELEPHONE (OUTPATIENT)
Dept: OBSTETRICS AND GYNECOLOGY | Facility: CLINIC | Age: 27
End: 2022-05-24

## 2022-05-24 NOTE — TELEPHONE ENCOUNTER
Patient returned call about IUD being in. She is on cycle now. There are no openings. May she be worked in? If so would you please call patient and schedule her? I will not be back until Monday. Thank You. Pt Ph [phone]

## 2022-05-25 NOTE — TELEPHONE ENCOUNTER
Chitra, can you please help this patient and get her scheduled.  I could potentially have her come first thing Friday morning at 9 and I can put it in.

## 2022-05-27 ENCOUNTER — PROCEDURE VISIT (OUTPATIENT)
Dept: OBSTETRICS AND GYNECOLOGY | Facility: CLINIC | Age: 27
End: 2022-05-27

## 2022-05-27 VITALS
SYSTOLIC BLOOD PRESSURE: 115 MMHG | HEART RATE: 70 BPM | WEIGHT: 275 LBS | BODY MASS INDEX: 45.76 KG/M2 | DIASTOLIC BLOOD PRESSURE: 83 MMHG

## 2022-05-27 DIAGNOSIS — Z30.430 ENCOUNTER FOR INSERTION OF MIRENA IUD: Primary | ICD-10-CM

## 2022-05-27 LAB
B-HCG UR QL: NEGATIVE
EXPIRATION DATE: NORMAL
INTERNAL NEGATIVE CONTROL: NEGATIVE
INTERNAL POSITIVE CONTROL: POSITIVE
Lab: NORMAL

## 2022-05-27 PROCEDURE — 81025 URINE PREGNANCY TEST: CPT | Performed by: OBSTETRICS & GYNECOLOGY

## 2022-05-27 PROCEDURE — 58300 INSERT INTRAUTERINE DEVICE: CPT | Performed by: OBSTETRICS & GYNECOLOGY

## 2022-05-27 NOTE — PROGRESS NOTES
CC: Mirena insertion    Lot#:[id number]  EXP:8/2024  NDC:32187-364-69    Procedure: Mirena Intrauterine device insertion  Preoperative diagnosis: Desires long acting reversible contraception  Postoperative diagnosis: Same  Indications:  Patient counseled on all forms of birth control and discussed their risks, side effects, and bleeding profiles.  Patient has elected to try Mirena.  Findings: Normal size, mobile, anteverted uterus  Anesthesia: None  Pathology: None  Estimated blood loss: Less than 5 mL  Complications: None    The risks, benefits, and alternatives to Mirena were explained at length with the patient. All her questions were answered and consents were signed.  The patient was placed in a dorsal lithotomy position on the examining table in [facility]. A bimanual exam confirmed the uterus was normal in size, anteverted, and midplane. A warmed metal speculum was inserted into the vagina and the cervix was brought into view. The cervix was prepped with Betadine. The anterior lip was grasped with a single-tooth tenaculum. The endometrial cavity was then sounded to 9 cm without use of a dilator. Gloves were then exchanged for sterile gloves. This sealed Mirena package was opened and the Mirena was removed in a sterile fashion.  The upper edge of the depth setting the flange was set at a uterine sound measured. The  was then carefully advanced to the cervical canal into the uterus to the level of the fundus. This was then backed off about 1.5-2 cm to allow sufficient space for the arms to open. The slider was then retracted about 1 cm and deployed the device. The device was then gently advanced to the fundus. The Mirena was then released by pulling the slider down all the way. The  was removed carefully from the uterus. The threads were then cut leaving 2-3 cm visible outside of the cervix.  The single-tooth tenaculum was removed from the anterior lip. Silver nitrate was applied to the tenaculum  sites. Good hemostasis was noted. All other instruments were removed from the vagina. There were no complications, and the patient tolerated the procedure well with a minimal amount of discomfort. The patient was counseled about the need to return in 4 weeks for string check. She was counseled about the need to use a backup method of contraception such as condoms until her post insertion exam was performed. The patient verbalized understanding that the Mirena will need to be removed/replaced after 5 years. The patient is counseled to contact us if she has any significant or increasing bleeding, pain, fever, chills, or other concerns. She is instructed to see a doctor right away if she believes that she may be pregnant at any time with the Mirena in place.

## 2022-06-07 ENCOUNTER — TELEPHONE (OUTPATIENT)
Dept: OBSTETRICS AND GYNECOLOGY | Facility: CLINIC | Age: 27
End: 2022-06-07

## 2022-06-07 NOTE — TELEPHONE ENCOUNTER
Dr. Quitnero, I just became aware that Beatriz is taking over Dr. Sims's messages, so please ignore.     Thank you     Beatriz, please see below.

## 2022-06-07 NOTE — TELEPHONE ENCOUNTER
Please let the pt know that vaginal bleeding after IUD placement is normal and can occur for 3-6 months. Given recent placement I would not recommend treating with any medications at this time. If she has heavy bleeding or severe pain she should f/u in the office or ED. Thank you

## 2022-06-07 NOTE — TELEPHONE ENCOUNTER
Good morning,     Patient is calling to relay message that she has experienced bleeding since getting the IUD placed. She would like for it to stop. She is requesting a medication to help stop the bleeding. She is also wanting to know the possible fernanda effects of that medication if possible.     Please advise,   Thank you     Pharmacy confirmed - [id number] - [place], [place] - [address] AT [facility] [address] - [phone] [facility] [phone] FX   informed of negative cultures. states she is still itching, advised to wear cotton underwear and fu with her OBGYN if it continues.-SIDNEY Devi, APRN

## 2022-06-29 ENCOUNTER — OFFICE VISIT (OUTPATIENT)
Dept: OBSTETRICS AND GYNECOLOGY | Facility: CLINIC | Age: 27
End: 2022-06-29

## 2022-06-29 VITALS
HEIGHT: 65 IN | BODY MASS INDEX: 45.98 KG/M2 | DIASTOLIC BLOOD PRESSURE: 87 MMHG | SYSTOLIC BLOOD PRESSURE: 126 MMHG | HEART RATE: 56 BPM | WEIGHT: 276 LBS

## 2022-06-29 DIAGNOSIS — Z30.431 IUD CHECK UP: Primary | ICD-10-CM

## 2022-06-29 DIAGNOSIS — F32.A DEPRESSION, UNSPECIFIED DEPRESSION TYPE: ICD-10-CM

## 2022-06-29 PROCEDURE — 99213 OFFICE O/P EST LOW 20 MIN: CPT | Performed by: OBSTETRICS & GYNECOLOGY

## 2022-06-29 NOTE — PROGRESS NOTES
"Chief Complaint  Follow-up     Pt following up from IUD insertion on 05/27/2022. She c/o vaginal bleeding.     Subjective        Krishna Clarke presents to [facility]  History of Present Illness  Patient is a 27-year-old that presents for Mirena IUD string check.  She had her IUD placed 1 month ago and reports continued bleeding.  She denies any significant pain.    She is also complaining of depression today and states she was seeing a therapist, but stopped seeing them because she felt it was not helping.  She reports that she has been on Adderall in the past.  She is scheduled in 1 week to establish care with a new primary care provider.  Objective   Vital Signs:  /87   Pulse 56   Ht 165.1 cm (65\")   Wt 125 kg (276 lb)   BMI 45.93 kg/m²   Estimated body mass index is 45.93 kg/m² as calculated from the following:    Height as of this encounter: 165.1 cm (65\").    Weight as of this encounter: 125 kg (276 lb).          Physical Exam   Result Review :           Physical Exam   Constitutional: She appears well-developed and well-nourished.   Abdominal: Soft. She exhibits no distension. There is no tenderness.   Genitourinary: Vagina normal and uterus normal. There is no rash, tenderness, lesion or injury on the right labia. There is no rash, tenderness, lesion or injury on the left labia. Cervix exhibits no motion tenderness, no discharge and no friability. Right adnexum displays no mass, no tenderness and no fullness. Left adnexum displays no mass, no tenderness and no fullness.   Genitourinary Comments: IUD strings visualized   Neurological: She is alert.   Psychiatric: She has a normal mood and affect.   Vitals reviewed.       Assessment and Plan   Diagnoses and all orders for this visit:    1. IUD check up (Primary)    2. Depression, unspecified depression type    IUD strings were visualized.  She was counseled on normal bleeding expectations post insertion and was advised " to follow-up if bleeding has not stopped by 6 months.    Patient reports depression and does currently have multiple social stressors.  I discussed with her that I feel she should definitely be established with a mental health provider and she states she recently stopped going to her therapist.  She is scheduled with a new primary care provider in 1 week and she was advised to discuss possible medication with them and referral to another mental health specialist.       Follow Up   No follow-ups on file.  Patient was given instructions and counseling regarding her condition or for health maintenance advice. Please see specific information pulled into the AVS if appropriate.

## 2022-10-28 ENCOUNTER — TELEPHONE (OUTPATIENT)
Dept: OBSTETRICS AND GYNECOLOGY | Facility: CLINIC | Age: 27
End: 2022-10-28

## 2022-10-28 DIAGNOSIS — Z30.431 IUD CHECK UP: Primary | ICD-10-CM

## 2022-10-28 NOTE — TELEPHONE ENCOUNTER
She needs to schedule an appointment to be able to determine what is going on.  I would also recommend that she have an ultrasound before her appointment with me to check the position of the IUD.

## 2022-10-28 NOTE — TELEPHONE ENCOUNTER
Regan Sims,  Pt called she is having problems with IUD she said she is still having a period every month, she is eating more and having issues with weight, and she is having a lot of discharge. She did schedule and appointment, but requested for a message to be sent to see if anything can be done without appointment. Please advise.    Thanks,   Jennifer

## 2022-11-11 ENCOUNTER — TELEPHONE (OUTPATIENT)
Dept: OBSTETRICS AND GYNECOLOGY | Facility: CLINIC | Age: 27
End: 2022-11-11

## 2022-11-11 NOTE — TELEPHONE ENCOUNTER
Caller: Krishna Clarke    Relationship to patient: Self    Best call back number: [phone]    Chief complaint: HEAVY BLEEDING MORE THAN A PAD/TAMPON PER HOUR WITH LARGE CLOTS.     Patient directed to call 911 or go to their nearest emergency room.     Patient verbalized understanding: [x] Yes  [] No  If no, why?     Additional notes: PT STATES SHE IS NOT FEELING DIZZY OR LIGHT HEADED AND WILL GOT TO NEAREST ER AFTER HER WORK SHIFT. PT WILL BE SEEN IN OFFICE ON 11/15/22.

## 2022-11-28 NOTE — TELEPHONE ENCOUNTER
[facility]    [facility] educator met with Patient discussed discharge plan for home IVABX. Billy Yatesn will dc home with family support. Patient will infuse medication via Elastomeric Pump. Patient on S.A.S.H procedure. S.A.S.H mat provided.  Patient education checklist reviewed and acknowledged by above person(s) above and are agreeable to discharge with home infusion plan of care. IV administration process using aspetic technique was reviewed with successful return demonstration. Patient feels comfortable with infusion. Patient will dc home with Ceftriaxone 2 gm every 24 hours for estimated end of therapy date 1/7/22. Dosing schedule times are 0900. Extension placed to double lumen picc. Juan LEIVA will follow patient for weekly dressing changes and lab draws. Time allotted for questions. Patients nurse and case management team notified teaching has been completed.     Medication delivery will be made to home    Patient accepted to care by Juan LEIVA and report called to Nathaly   Phone number [phone]    [facility]  aPmela Najera RN, Clinical Educator  Cell [phone]  Office [phone]  Fax [phone]         Pt informed of normal genetic results.  Also aware baby is female.

## 2022-11-29 ENCOUNTER — OFFICE VISIT (OUTPATIENT)
Dept: OBSTETRICS AND GYNECOLOGY | Facility: CLINIC | Age: 27
End: 2022-11-29

## 2022-11-29 VITALS
SYSTOLIC BLOOD PRESSURE: 115 MMHG | BODY MASS INDEX: 46.38 KG/M2 | HEIGHT: 65 IN | DIASTOLIC BLOOD PRESSURE: 76 MMHG | HEART RATE: 70 BPM | WEIGHT: 278.4 LBS

## 2022-11-29 DIAGNOSIS — Z30.011 ENCOUNTER FOR INITIAL PRESCRIPTION OF CONTRACEPTIVE PILLS: Primary | ICD-10-CM

## 2022-11-29 PROCEDURE — 99213 OFFICE O/P EST LOW 20 MIN: CPT | Performed by: NURSE PRACTITIONER

## 2022-11-29 PROCEDURE — 81025 URINE PREGNANCY TEST: CPT | Performed by: NURSE PRACTITIONER

## 2022-11-29 RX ORDER — NORETHINDRONE ACETATE AND ETHINYL ESTRADIOL 1; .02 MG/1; MG/1
1 TABLET ORAL DAILY
Qty: 84 TABLET | Refills: 3 | Status: SHIPPED | OUTPATIENT
Start: 2022-11-29 | End: 2023-01-09
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
## 2022-12-28 ENCOUNTER — TELEPHONE (OUTPATIENT)
Dept: OBSTETRICS AND GYNECOLOGY | Facility: CLINIC | Age: 27
End: 2022-12-28

## 2022-12-28 RX ORDER — ONDANSETRON 4 MG/1
4 TABLET, FILM COATED ORAL EVERY 8 HOURS PRN
Qty: 30 TABLET | Refills: 1 | Status: SHIPPED | OUTPATIENT
Start: 2022-12-28 | End: 2023-12-28

## 2022-12-28 NOTE — TELEPHONE ENCOUNTER
Caller: GANESH ESTEVEZ    Relationship to patient: SELF    Best call back number:[phone]    Patient is needing:   PT HAS PROMETHAZINE FOR NAUSEA BUT IT MAKES HER VERY TIRED AND SHE CAN'T WORK WHEN SHE TAKES IT. SHE WOULD LIKE A DIFFERENT NAUSEA MEDICATION THAT DOES NOT CAUSE SLEEPINESS.  SHE ALSO WANTS TO KNOW IF THE ANTI-DEPRESSANT IS SAFE TO TAKE WHILE PREGNANT    HOME PREGNANCY TEST AND PCP VERIFIED PREGNANCY, UNKNOWN LMP  MID-November    PT WOULD LIKE TO KNOW WHEN SHE NEEDS TO BE SEEN FOR PREGNANCY

## 2022-12-28 NOTE — TELEPHONE ENCOUNTER
I have sent in Zofran for her to try for her nausea.  Hermila, can you find out what antidepressant she is on?  I cannot advise her whether it is safe for her to be on it if I don't know the medication.

## 2022-12-28 NOTE — TELEPHONE ENCOUNTER
Prozac 20 mg is what was prescribed. She has not taken yet, but wants to know what you think is best. Thank you.

## 2022-12-28 NOTE — TELEPHONE ENCOUNTER
spoke with Krishna to let her know that Dr Sims has sent in an Rx of zofran for nausea to her [facility] at [address] [facility]. She is prescribed prozac 20 mg, but has not taken yet, as she wants to know what Dr Sims thinks. Message being sent to Dr. Sims.

## 2023-01-04 NOTE — TELEPHONE ENCOUNTER
Pt calling  [facility]  [address] [place] [id number]  Dept: [phone]  Dept Fax: [phone]  Loc: [phone]     1/3/2023     Corey Millan (:  1953) is a 68 y.o. female, here for evaluation of the following medical concerns:    Chief Complaint   Patient presents with    Medicare AWV     Here for Medicare AWV. HPI  Treatment Adherence:   Medication compliance:  compliant most of the time  Diet compliance:  compliant most of the time  Weight trend: stable  Current exercise: no regular exercise  Barriers: none    Diabetes Mellitus Type 2: Current symptoms/problems include none. Home blood sugar records: trend: stable  Any episodes of hypoglycemia? no  Eye exam current (within one year): yes  Tobacco history: She  reports that she quit smoking about 23 years ago. Her smoking use included cigarettes. She has quit using smokeless tobacco.   Daily Aspirin? Yes    Hypertension:  Home blood pressure monitoring: No.  She is adherent to a low sodium diet. Patient denies chest pain, shortness of breath, headache, lightheadedness, and blurred vision. Antihypertensive medication side effects: no medication side effects noted. Use of agents associated with hypertension: none. Hyperlipidemia:  No new myalgias or GI upset on atorvastatin (Lipitor). Lab Results   Component Value Date    LABA1C 7.6 (H) 2022    LABA1C 7.1 (H) 2022    LABA1C 7.7 (H) 2021     Lab Results   Component Value Date    LABMICR 3.43 2022    CREATININE 0.8 2022     Lab Results   Component Value Date    ALT 26 2022    AST 41 (H) 2022     Lab Results   Component Value Date    CHOL 122 2021    TRIG 127 2021    HDL 41 2021    LDLCALC 56 2021    LDLDIRECT 79.69 2015        Hypothyroidism: Recent symptoms: none. She denies weight gain, weight loss, cold intolerance, and heat intolerance.  Patient is taking her medication consistently on an empty stomach. No results found for: [facility]  Lab Results   Component Value Date    TSH 1.610 11/01/2022    TSH 1.790 08/30/2022    TSH 2.820 11/01/2021       Review of Systems   Constitutional:  Negative for chills, fatigue and fever. HENT:  Negative for congestion, facial swelling, sinus pain, sore throat and trouble swallowing. Eyes:  Negative for pain and visual disturbance. Respiratory:  Negative for cough, shortness of breath and wheezing. Cardiovascular:  Negative for chest pain and palpitations. Gastrointestinal:  Negative for abdominal pain, diarrhea, nausea and vomiting. Genitourinary:  Negative for difficulty urinating, dysuria and urgency. Musculoskeletal:  Negative for back pain, gait problem and neck pain. Skin:  Negative for color change and rash. Neurological:  Negative for dizziness, weakness and headaches. Psychiatric/Behavioral:  Negative for agitation and sleep disturbance. The patient is not nervous/anxious. Prior to Visit Medications    Medication Sig Taking?  Authorizing Provider   glimepiride (AMARYL) 2 MG tablet TAKE 2 TABLETS (4 MG) IN THE MORNING AND 1 TABLET (2 MG) IN THE EVENING Yes FABIENNE Kidd CNP   pantoprazole (PROTONIX) 40 MG tablet TAKE 1 TABLET TWICE DAILY Yes FABIENNE Hurd CNP   metFORMIN (GLUCOPHAGE-XR) 500 MG extended release tablet TAKE 2 TABLETS TWICE DAILY Yes FABIENNE Hurd CNP   escitalopram (LEXAPRO) 20 MG tablet TAKE 1 TABLET EVERY DAY Yes FABIENNE Hurd CNP   acetaminophen (TYLENOL) 500 MG tablet Take 1,000 mg by mouth every 6 hours as needed for Pain Yes Historical Provider, MD   lisinopril (PRINIVIL;ZESTRIL) 20 MG tablet TAKE 1 TABLET TWICE DAILY Yes Indu Espinal MD   levothyroxine (SYNTHROID) 50 MCG tablet TAKE 1 TABLET EVERY DAY Yes Indu Espinal MD   atorvastatin (LIPITOR) 40 MG tablet TAKE 1 TABLET EVERY DAY Yes Indu Espinal MD   atenolol (TENORMIN) 50 MG tablet TAKE 1/2 Jamar Carter Yes Ricci Torres MD   blood glucose test strips (ACCU-CHEK ISABELA PLUS) strip USE 1 STRIP TO CHECK GLUCOSE ONCE DAILY Yes Ricci Torres MD   Blood Glucose Monitoring Suppl (ACCU-CHEK ISABELA PLUS) w/Device KIT USE 1 TO CHECK GLUCOSE ONCE DAILY Yes Historical Provider, MD   blood glucose monitor kit and supplies Glucose monitor and supplies, brand per pt preference. Test sugar daily. Dx: E11.9 Yes Ricci Torres MD   Lancets MISC Test sugar daily. Brand per pt preference. Dx: E11.9 Yes Ricci Torres MD   aspirin 81 MG tablet Take 81 mg by mouth nightly Yes Historical Provider, MD   FISH OIL Take by mouth nightly Arthur Red Yes Historical Provider, MD   Docusate Calcium (STOOL SOFTENER PO) Take by mouth nightly as needed  Yes Historical Provider, MD   ibuprofen (ADVIL;MOTRIN) 200 MG tablet Take 400 mg by mouth every 6 hours as needed for Fever  Historical Provider, MD   albuterol sulfate HFA (VENTOLIN HFA) 108 (90 Base) MCG/ACT inhaler Inhale 2 puffs into the lungs 4 times daily as needed for Wheezing  Patient not taking: Reported on 2022  FABIENNE Sloan - CNP        Social History     Tobacco Use    Smoking status: Former     Years: .     Types: Cigarettes     Quit date: 1999     Years since quittin.1    Smokeless tobacco: Former   Substance Use Topics    Alcohol use: No     Comment: rarely        Vitals:    23 1506   BP: 110/68   Site: Left Upper Arm   Pulse: 68   Resp: 16   Temp: 98.4 °F (36.9 °C)   TempSrc: Oral   Weight: 168 lb (76.2 kg)   Height: 5' 2\" (1.575 m)     Estimated body mass index is 30.73 kg/m² as calculated from the following:    Height as of this encounter: 5' 2\" (1.575 m). Weight as of this encounter: 168 lb (76.2 kg). Physical Exam  Vitals reviewed. Constitutional:       General: She is not in acute distress. Appearance: Normal appearance. She is well-developed.    HENT:      Head: Normocephalic and atraumatic. Right Ear: Hearing, tympanic membrane, ear canal and external ear normal.      Left Ear: Hearing, tympanic membrane, ear canal and external ear normal.      Nose: Nose normal. No nasal tenderness. Mouth/Throat:      Lips: Pink. Mouth: Mucous membranes are moist. No oral lesions. Pharynx: Oropharynx is clear. Uvula midline. Eyes:      General:         Right eye: No discharge. Left eye: No discharge. Conjunctiva/sclera: Conjunctivae normal.   Neck:      Vascular: No carotid bruit. Trachea: No tracheal deviation. Cardiovascular:      Rate and Rhythm: Normal rate and regular rhythm. Heart sounds: Normal heart sounds. No murmur heard. Pulmonary:      Effort: Pulmonary effort is normal. No respiratory distress. Breath sounds: Normal breath sounds. Abdominal:      General: Bowel sounds are normal.      Palpations: Abdomen is soft. Tenderness: There is no abdominal tenderness. Musculoskeletal:      Cervical back: Full passive range of motion without pain and neck supple. Lymphadenopathy:      Head:      Right side of head: No submental, submandibular, tonsillar, preauricular, posterior auricular or occipital adenopathy. Left side of head: No submental, submandibular, tonsillar, preauricular, posterior auricular or occipital adenopathy. Cervical: No cervical adenopathy. Skin:     General: Skin is warm and dry. Findings: No rash. Neurological:      General: No focal deficit present. Mental Status: She is alert and oriented to person, place, and time. Coordination: Coordination normal.   Psychiatric:         Mood and Affect: Mood normal.         Behavior: Behavior normal.         Thought Content: Thought content normal.         Judgment: Judgment normal.       ASSESSMENT/PLAN:  1. Medicare annual wellness visit, subsequent    2.  Encounter for screening mammogram for malignant neoplasm of breast  - [facility] DIGITAL SCREEN W OR WO CAD BILATERAL; Future    3. Chronic systolic (congestive) heart failure ([facility] [address]    4. Diabetic nephropathy associated with type 2 diabetes mellitus ([address]    5. Thrombocytopenia ([address]    6. Type 2 diabetes mellitus with complication, without long-term current use of insulin (HCC)  - Lipid Panel; Future  - Hemoglobin A1C; Future    7. Essential hypertension  - CBC with Auto Differential; Future  - Comprehensive Metabolic Panel; Future    8. Hypothyroidism, unspecified type    - Recommended 6 months follow up and Pt argumentative and states \"I'll follow up when I want\" and \"there is no reason to follow up so much\". Informed pt that she has multiple chronic diseases and its recommended that she follow up every 6 months. - Pt does follow up with Cardiology and Nephrology as well   - Pt declined all Health maintenance recommendations as well. Mammogram order still placed. - Recommended labs be completed in Feb 2023. Pt did stop Jardiance that was given at last appt because it was too expensive. She did not call the office to let us know. .       Return in 6 months (on 7/3/2023), or if symptoms worsen or fail to improve, for Medicare Annual Wellness Visit in 1 year, Routine follow up, Medication check. Patient given educational materials - see patient instructions. Discussed use, benefit, and side effects of prescribed medications. All patient questions answered. Pt voiced understanding. Reviewed health maintenance. An electronic signature was used to authenticate this note.     --FABIENNE Guillen - CNP on 1/4/2023 at 8:01 AM

## 2023-01-09 ENCOUNTER — INITIAL PRENATAL (OUTPATIENT)
Dept: OBSTETRICS AND GYNECOLOGY | Facility: CLINIC | Age: 28
End: 2023-01-09
Payer: COMMERCIAL

## 2023-01-09 VITALS — BODY MASS INDEX: 46.89 KG/M2 | WEIGHT: 281.8 LBS | SYSTOLIC BLOOD PRESSURE: 105 MMHG | DIASTOLIC BLOOD PRESSURE: 70 MMHG

## 2023-01-09 DIAGNOSIS — Z34.90 EARLY STAGE OF PREGNANCY: Primary | ICD-10-CM

## 2023-01-09 DIAGNOSIS — Z34.90 PREGNANCY WITH UNCERTAIN DATES, ANTEPARTUM: ICD-10-CM

## 2023-01-09 LAB
B-HCG UR QL: POSITIVE
EXPIRATION DATE: ABNORMAL
GLUCOSE UR STRIP-MCNC: NEGATIVE MG/DL
INTERNAL NEGATIVE CONTROL: NEGATIVE
INTERNAL POSITIVE CONTROL: POSITIVE
Lab: ABNORMAL
PROT UR STRIP-MCNC: NEGATIVE MG/DL

## 2023-01-09 PROCEDURE — 81025 URINE PREGNANCY TEST: CPT | Performed by: OBSTETRICS & GYNECOLOGY

## 2023-01-09 PROCEDURE — 99214 OFFICE O/P EST MOD 30 MIN: CPT | Performed by: OBSTETRICS & GYNECOLOGY

## 2023-01-09 RX ORDER — FLUOXETINE HYDROCHLORIDE 20 MG/1
20 CAPSULE ORAL DAILY
COMMUNITY
Start: 2023-01-06
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
## 2023-01-10 LAB
ABO GROUP BLD: NORMAL
AMPHETAMINES UR QL SCN: NEGATIVE NG/ML
BARBITURATES UR QL SCN: NEGATIVE NG/ML
BASOPHILS # BLD AUTO: 0 X10E3/UL (ref 0–0.2)
BASOPHILS NFR BLD AUTO: 0 %
BENZODIAZ UR QL: NEGATIVE NG/ML
BLD GP AB SCN SERPL QL: NEGATIVE
BZE UR QL: NEGATIVE NG/ML
CANNABINOIDS UR QL SCN: NEGATIVE NG/ML
EOSINOPHIL # BLD AUTO: 0.1 X10E3/UL (ref 0–0.4)
EOSINOPHIL NFR BLD AUTO: 1 %
ERYTHROCYTE [DISTWIDTH] IN BLOOD BY AUTOMATED COUNT: 13.4 % (ref 11.7–15.4)
HBV SURFACE AG SERPL QL IA: NEGATIVE
HCT VFR BLD AUTO: 36.7 % (ref 34–46.6)
HCV AB S/CO SERPL IA: <0.1 S/CO RATIO (ref 0–0.9)
HGB BLD-MCNC: 12.2 G/DL (ref 11.1–15.9)
HIV 1+2 AB+HIV1 P24 AG SERPL QL IA: NON REACTIVE
IMM GRANULOCYTES # BLD AUTO: 0 X10E3/UL (ref 0–0.1)
IMM GRANULOCYTES NFR BLD AUTO: 0 %
LYMPHOCYTES # BLD AUTO: 2.8 X10E3/UL (ref 0.7–3.1)
LYMPHOCYTES NFR BLD AUTO: 29 %
MCH RBC QN AUTO: 29.3 PG (ref 26.6–33)
MCHC RBC AUTO-ENTMCNC: 33.2 G/DL (ref 31.5–35.7)
MCV RBC AUTO: 88 FL (ref 79–97)
METHADONE UR QL SCN: NEGATIVE NG/ML
MONOCYTES # BLD AUTO: 0.7 X10E3/UL (ref 0.1–0.9)
MONOCYTES NFR BLD AUTO: 7 %
NEUTROPHILS # BLD AUTO: 5.8 X10E3/UL (ref 1.4–7)
NEUTROPHILS NFR BLD AUTO: 63 %
OPIATES UR QL: NEGATIVE NG/ML
PCP UR QL: NEGATIVE NG/ML
PLATELET # BLD AUTO: 296 X10E3/UL (ref 150–450)
PROPOXYPH UR QL SCN: NEGATIVE NG/ML
RBC # BLD AUTO: 4.17 X10E6/UL (ref 3.77–5.28)
RH BLD: POSITIVE
RPR SER QL: NON REACTIVE
RUBV IGG SERPL IA-ACNC: 11.6 INDEX
WBC # BLD AUTO: 9.4 X10E3/UL (ref 3.4–10.8)

## 2023-01-11 LAB
BACTERIA UR CULT: NORMAL
BACTERIA UR CULT: NORMAL
C TRACH RRNA SPEC QL NAA+PROBE: NEGATIVE
N GONORRHOEA RRNA SPEC QL NAA+PROBE: NEGATIVE
T VAGINALIS RRNA SPEC QL NAA+PROBE: NEGATIVE

## 2023-02-06 ENCOUNTER — ROUTINE PRENATAL (OUTPATIENT)
Dept: OBSTETRICS AND GYNECOLOGY | Facility: CLINIC | Age: 28
End: 2023-02-06
Payer: COMMERCIAL

## 2023-02-06 VITALS — DIASTOLIC BLOOD PRESSURE: 60 MMHG | BODY MASS INDEX: 48.09 KG/M2 | WEIGHT: 289 LBS | SYSTOLIC BLOOD PRESSURE: 96 MMHG

## 2023-02-06 DIAGNOSIS — Z36.0 ENCOUNTER FOR ANTENATAL SCREENING FOR CHROMOSOMAL ANOMALIES: ICD-10-CM

## 2023-02-06 DIAGNOSIS — Z3A.12 12 WEEKS GESTATION OF PREGNANCY: Primary | ICD-10-CM

## 2023-02-06 PROCEDURE — 99213 OFFICE O/P EST LOW 20 MIN: CPT | Performed by: OBSTETRICS & GYNECOLOGY

## 2023-02-06 NOTE — PROGRESS NOTES
Chief Complaint   Patient presents with   • Routine Prenatal Visit     HPI- Pt is 27 y.o.  at 12w3d here for prenatal visit.  Patient is doing well and has no complaints today.    ROS-     - No vaginal bleeding    GI- No abdominal pain    BP 96/60   Wt 131 kg (289 lb)   LMP 2022 (Approximate)   BMI 48.09 kg/m²   Exam - See flow sheet    Fetal heart rate is normal    Assessment-  Diagnoses and all orders for this visit:    12 weeks gestation of pregnancy    Encounter for  screening for chromosomal anomalies  -     [id number] PLUS Core (chr21,18,13,sex) - Blood,    Reviewed initial labs and OB ultrasound.  She was offered cell free DNA testing and desires.  She will follow-up with me in 4 weeks.

## 2023-03-06 ENCOUNTER — ROUTINE PRENATAL (OUTPATIENT)
Dept: OBSTETRICS AND GYNECOLOGY | Facility: CLINIC | Age: 28
End: 2023-03-06
Payer: COMMERCIAL

## 2023-03-06 VITALS — WEIGHT: 280.6 LBS | BODY MASS INDEX: 46.69 KG/M2 | DIASTOLIC BLOOD PRESSURE: 68 MMHG | SYSTOLIC BLOOD PRESSURE: 111 MMHG

## 2023-03-06 DIAGNOSIS — O99.210 MATERNAL OBESITY AFFECTING PREGNANCY, ANTEPARTUM: Primary | ICD-10-CM

## 2023-03-06 DIAGNOSIS — Z3A.16 16 WEEKS GESTATION OF PREGNANCY: ICD-10-CM

## 2023-03-06 DIAGNOSIS — Z36.86 ENCOUNTER FOR ANTENATAL SCREENING FOR CERVICAL LENGTH: ICD-10-CM

## 2023-03-06 DIAGNOSIS — Z36.89 ENCOUNTER FOR FETAL ANATOMIC SURVEY: ICD-10-CM

## 2023-03-06 PROCEDURE — 99213 OFFICE O/P EST LOW 20 MIN: CPT | Performed by: OBSTETRICS & GYNECOLOGY

## 2023-03-06 NOTE — PROGRESS NOTES
Chief Complaint   Patient presents with   • Routine Prenatal Visit     HPI- Pt is 27 y.o.  at 16w3d here for prenatal visit.  She has no complaints.  She reports she has been feeling well.    ROS-     - No vaginal bleeding    GI- No abdominal pain    /68   Wt 127 kg (280 lb 9.6 oz)   LMP 2022 (Approximate)   BMI 46.69 kg/m²   Exam - See flow sheet    Fetal heart rate is normal    Assessment-  Diagnoses and all orders for this visit:    Maternal obesity affecting pregnancy, antepartum    16 weeks gestation of pregnancy    Encounter for fetal anatomic survey  -     US Ob 14 + Weeks Single or First Gestation; Future    Encounter for  screening for cervical length  -     US Ob Transvaginal; Future    Patient was offered screening for spina bifida and declines.  She will return in 4 weeks with anatomy ultrasound.

## 2023-03-08 ENCOUNTER — TELEPHONE (OUTPATIENT)
Dept: OBSTETRICS AND GYNECOLOGY | Facility: CLINIC | Age: 28
End: 2023-03-08
Payer: COMMERCIAL

## 2023-03-08 NOTE — TELEPHONE ENCOUNTER
Left message for Krishna that it is safe for you to take antihistamines such as claritin, zyrtec, benadryl for allergies during pregnancy. If you have more nasal congestion, you can take mucinex - plain, a cough, robitussin - plain, cough drops. You can also use a saline nasal spray if your mucus membranes in your sinuses get dried out. Take care and if we can be of further help, please let us know. Thank you.

## 2023-03-08 NOTE — TELEPHONE ENCOUNTER
Caller: Krishna Clarke    Relationship: Self    Best call back number: [phone]    What was the call regarding: PT WOULD LIKE TO KNOW WHAT MEDICATIONS ARE SAFE TO TAKE WHILE PREGNANT FOR ALLERGIES.     Do you require a callback: YES   [Joint Pain] : joint pain [Negative] : Heme/Lymph

## 2023-04-10 ENCOUNTER — ROUTINE PRENATAL (OUTPATIENT)
Dept: OBSTETRICS AND GYNECOLOGY | Facility: CLINIC | Age: 28
End: 2023-04-10
Payer: COMMERCIAL

## 2023-04-10 VITALS — DIASTOLIC BLOOD PRESSURE: 65 MMHG | BODY MASS INDEX: 46.96 KG/M2 | WEIGHT: 282.2 LBS | SYSTOLIC BLOOD PRESSURE: 106 MMHG

## 2023-04-10 DIAGNOSIS — O35.EXX0 PYELECTASIS OF FETUS ON PRENATAL ULTRASOUND: Primary | ICD-10-CM

## 2023-04-10 DIAGNOSIS — Z3A.21 21 WEEKS GESTATION OF PREGNANCY: ICD-10-CM

## 2023-04-10 DIAGNOSIS — O09.299 PREVIOUS STILLBIRTH OR DEMISE, ANTEPARTUM: ICD-10-CM

## 2023-04-10 LAB
GLUCOSE UR STRIP-MCNC: NEGATIVE MG/DL
PROT UR STRIP-MCNC: ABNORMAL MG/DL

## 2023-04-10 NOTE — PROGRESS NOTES
Chief Complaint   Patient presents with   • Routine Prenatal Visit     HPI- Pt is 28 y.o.  at 21w3d here for prenatal visit.  She does report some lower back pain that occurs more with sleeping.  She is using a heating pad as needed.  She is feeling good fetal movement.    ROS-     - No vaginal bleeding    GI- No abdominal pain    /65   Wt 128 kg (282 lb 3.2 oz)   LMP 2022 (Approximate)   BMI 46.96 kg/m²   Exam - See flow sheet    Fetal heart rate is normal    Assessment-  Diagnoses and all orders for this visit:    Pyelectasis of fetus on prenatal ultrasound  -     [facility] [place]; Future    Previous stillbirth or demise, antepartum    21 weeks gestation of pregnancy    Other orders  -     POC Urinalysis Dipstick    Anatomy ultrasound was performed and does show bilateral fetal pyelectasis.  Fetal heart could also not be well visualized.  Discussed recommendations for ultrasound and consult with maternal-fetal medicine and referral was placed.  She will follow-up with me in 4 weeks.

## 2023-04-28 ENCOUNTER — TELEPHONE (OUTPATIENT)
Dept: OBSTETRICS AND GYNECOLOGY | Facility: CLINIC | Age: 28
End: 2023-04-28
Payer: COMMERCIAL

## 2023-04-28 NOTE — TELEPHONE ENCOUNTER
Spoke with Krishna to let her know that normally have gestational diabetic testing is at 28 weeks. I am sure that Dr Sims will discuss at your appt on 5/8/23 and get you scheduled. There are other reasons for having dry mouth. You should be drinking 10 to 12 glasses of water daily as dehydration can cause dry mouth and contractions. Hormonal changes, increased blood production and of course, gestational diabetes can cause dry mouth as well as some medications. Increase water intake and see if your tongue and body will feel better. Thank you.

## 2023-04-28 NOTE — TELEPHONE ENCOUNTER
Hermila    Can you call pt and discuss with her and let her know when she will have her glucose testing?    Prosper

## 2023-04-28 NOTE — TELEPHONE ENCOUNTER
GANESH ESTEVEZ    [phone]    PT HAS HEARD DRY MOUTH LINKED TO DIABETES DURING PREGNANCY    WOULD LIKE TO KNOW WHEN WILL SHE BE GETTING HER GLUCOSE TESTING
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
## 2023-05-03 ENCOUNTER — HOSPITAL ENCOUNTER (OUTPATIENT)
Dept: ULTRASOUND IMAGING | Facility: HOSPITAL | Age: 28
Discharge: HOME | End: 2023-05-03
Admitting: OBSTETRICS & GYNECOLOGY
Payer: COMMERCIAL

## 2023-05-03 ENCOUNTER — OFFICE VISIT (OUTPATIENT)
Dept: OBSTETRICS AND GYNECOLOGY | Facility: CLINIC | Age: 28
End: 2023-05-03
Payer: COMMERCIAL

## 2023-05-03 VITALS
RESPIRATION RATE: 16 BRPM | BODY MASS INDEX: 47.68 KG/M2 | WEIGHT: 286.2 LBS | SYSTOLIC BLOOD PRESSURE: 99 MMHG | HEART RATE: 87 BPM | DIASTOLIC BLOOD PRESSURE: 68 MMHG | HEIGHT: 65 IN | TEMPERATURE: 98.2 F

## 2023-05-03 DIAGNOSIS — O35.EXX0 PYELECTASIS OF FETUS ON PRENATAL ULTRASOUND: Primary | ICD-10-CM

## 2023-05-03 DIAGNOSIS — O35.EXX0 PYELECTASIS OF FETUS ON PRENATAL ULTRASOUND: ICD-10-CM

## 2023-05-03 DIAGNOSIS — O09.299 PREVIOUS STILLBIRTH OR DEMISE, ANTEPARTUM: ICD-10-CM

## 2023-05-03 PROCEDURE — 76811 OB US DETAILED SNGL FETUS: CPT
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
## 2023-05-03 NOTE — PROGRESS NOTES
Pt reports that she is doing well and denies vaginal bleeding, cramping, contractions or LOF at this time. Reports active fetal movement. Reviewed when to call OB office or present to L&D for evaluation with symptoms such as decreased fetal movement, vaginal bleeding, LOF or ctxs. Pt verbalized understanding. Denies HA, visual changes or epigastric pain. Denies any additional complaints at time of appointment. Next OB appointment scheduled for 05/08    Vitals:    05/03/23 0843   BP: 99/68   Pulse: 87   Resp: 16   Temp: 98.2 °F (36.8 °C)

## 2023-05-08 ENCOUNTER — PATIENT OUTREACH (OUTPATIENT)
Dept: LABOR AND DELIVERY | Facility: HOSPITAL | Age: 28
End: 2023-05-08
Payer: COMMERCIAL

## 2023-05-08 ENCOUNTER — REFERRAL TRIAGE (OUTPATIENT)
Dept: LABOR AND DELIVERY | Facility: HOSPITAL | Age: 28
End: 2023-05-08
Payer: COMMERCIAL

## 2023-05-08 ENCOUNTER — ROUTINE PRENATAL (OUTPATIENT)
Dept: OBSTETRICS AND GYNECOLOGY | Facility: CLINIC | Age: 28
End: 2023-05-08
Payer: COMMERCIAL

## 2023-05-08 VITALS — WEIGHT: 286.6 LBS | SYSTOLIC BLOOD PRESSURE: 101 MMHG | DIASTOLIC BLOOD PRESSURE: 68 MMHG | BODY MASS INDEX: 47.69 KG/M2

## 2023-05-08 DIAGNOSIS — Z13.1 SCREENING FOR DIABETES MELLITUS: ICD-10-CM

## 2023-05-08 DIAGNOSIS — O35.EXX0 PYELECTASIS OF FETUS ON PRENATAL ULTRASOUND: ICD-10-CM

## 2023-05-08 DIAGNOSIS — Z3A.25 25 WEEKS GESTATION OF PREGNANCY: ICD-10-CM

## 2023-05-08 DIAGNOSIS — Z86.19 HISTORY OF HERPES GENITALIS: ICD-10-CM

## 2023-05-08 DIAGNOSIS — O09.299 PREVIOUS STILLBIRTH OR DEMISE, ANTEPARTUM: Primary | ICD-10-CM

## 2023-05-08 DIAGNOSIS — Z13.0 SCREENING FOR IRON DEFICIENCY ANEMIA: ICD-10-CM

## 2023-05-08 LAB
GLUCOSE UR STRIP-MCNC: NEGATIVE MG/DL
PROT UR STRIP-MCNC: NEGATIVE MG/DL

## 2023-05-08 NOTE — OUTREACH NOTE
[facility]  Enrollment    Current Estimated Gestational Age: 25w3d    Questions/Answers    Flowsheet Row Responses   Would like to participate? Yes   Date of Intake Visit 05/08/23        [facility]  Intake    Current Estimated Gestational Age: 25w3d    Intake Assessment    Flowsheet Row Responses   Best Method for Contacting Cell   Currently Employed Yes   Able to keep appointments as scheduled Yes   Gender(s) and Name(s) f   Do you have a dentist? Yes   Have you seen a dentist in the last 6 months Yes   Resources Presently Utilizing: WIC (Women, Infant, Children), Other, HANDS, Mental Health Services   Other Resources Utilizing: SNAP   Maternal Warning Signs Provided   Other Education HANDS, How to find a dentist, How to find a primary care provider, Insurance benefits/Incentives            Tobacco, Alcohol, and Drug History     reports that she has never smoked. She has never used smokeless tobacco.   reports no history of alcohol use.   reports no history of drug use.    [facility]  Check-In    Current Estimated Gestational Age: 25w3d    Questions/Answers    Flowsheet Row Responses   Best Method for Contacting Cell   Demographics Reviewed No   Currently Employed Yes   Able to keep appointments as scheduled Yes   Gender(s) and Name(s) f   Baby Active/Feeling Fetal Movemen Yes   How are you presently feeling? good   Questions regarding prenatal visits or tests to be ordered? No   May I ask you questions about your substance use? --  [no hx]   Resource/Environmental Concerns None   Do you have any questions related to your care experience, your pregnancy, plans for delivery, any concerns, etc? No   Other Education HANDS, How to find a dentist, How to find a primary care provider, Insurance benefits/Incentives        Pt with hx of loss of 2 year old, doesn't have custody of one of her children.  Has been seen by several therapists but looking for a new one.  Sent Kaur referral today.   Used HANDS with prev pg, wants again, sent referral.  Has WIC and SNAP.  Moving soon, lease not being renewed due to termites and work that landlord doesn't want to fix.  Has section 8, they will assist with housing, [facility] sent additional resources today.  Marternal warning s/s reviewed.  Seeing MFM for fetal pylectasis.    Will follow up in 2 weeks.    Referral submitted to the following resources (verbal consent received to submit demographic information):     Kaur WHITE RN  Maternity Nurse Navigator    5/8/2023, 17:49 EDT

## 2023-05-08 NOTE — PROGRESS NOTES
Chief Complaint   Patient presents with   • Routine Prenatal Visit     HPI- Pt is 28 y.o.  at 25w3d here for prenatal visit.  She is overall doing well and has no major complaints.  She does report a lot of fetal movement.    ROS-     - No vaginal bleeding    GI- No abdominal pain    /68   Wt 130 kg (286 lb 9.6 oz)   LMP 2022 (Approximate)   BMI 47.69 kg/m²   Exam - See flow sheet    Fetal heart rate is normal    Assessment-  Diagnoses and all orders for this visit:    Previous stillbirth or demise, antepartum  -     US Ob Follow Up Transabdominal Approach; Future    25 weeks gestation of pregnancy    Pyelectasis of fetus on prenatal ultrasound  -     US Ob Follow Up Transabdominal Approach; Future    History of herpes genitalis    Screening for diabetes mellitus  -     Gestational Screen 1 Hr ([facility])    Screening for iron deficiency anemia  -     CBC (No Diff)    Other orders  -     POC Urinalysis Dipstick    Patient had a visit with maternal-fetal medicine last week and ultrasound was reviewed.  Bilateral pyelectasis was noted.  She has follow-up with them in approximately 5 weeks.  I discussed with her recommendations to check another growth ultrasound with her next visit with me in 3 weeks.  We will also do her glucose test with that visit and instructions were given.

## 2023-05-23 ENCOUNTER — PATIENT OUTREACH (OUTPATIENT)
Dept: LABOR AND DELIVERY | Facility: HOSPITAL | Age: 28
End: 2023-05-23
Payer: COMMERCIAL

## 2023-05-23 NOTE — OUTREACH NOTE
[facility]  Check-In    Current Estimated Gestational Age: 27w4d    Questions/Answers    Flowsheet Row Responses   Best Method for Contacting Cell   Demographics Reviewed Yes   Currently Employed Yes   Able to keep appointments as scheduled Yes   Gender(s) and Name(s) m   Baby Active/Feeling Fetal Movemen Yes   How are you presently feeling? OK   Are you having any of the following symptoms? Pressure  [Cameron madsen, occas pressure]   Questions regarding prenatal visits or tests to be ordered? No   Do you have any questions related to your care experience, your pregnancy, plans for delivery, any concerns, etc? No        Pt doing well overall, reports occas [place] Madsen and pelvic pressure. Reviewed warning s/s of pg, pt verbalized understanding. Has not viewed my prev [facility] msg but will do so today.  Sent 28wk packt and explained.    Will check up again around 35 wk.    Sebastian Pruett RN  Maternity Nurse Navigator    5/23/2023, 13:54 EDT

## 2023-05-31 ENCOUNTER — ROUTINE PRENATAL (OUTPATIENT)
Dept: OBSTETRICS AND GYNECOLOGY | Facility: CLINIC | Age: 28
End: 2023-05-31

## 2023-05-31 VITALS — DIASTOLIC BLOOD PRESSURE: 74 MMHG | SYSTOLIC BLOOD PRESSURE: 112 MMHG | BODY MASS INDEX: 47.93 KG/M2 | WEIGHT: 288 LBS

## 2023-05-31 DIAGNOSIS — Z3A.28 28 WEEKS GESTATION OF PREGNANCY: ICD-10-CM

## 2023-05-31 DIAGNOSIS — Z13.89 SCREENING FOR BLOOD OR PROTEIN IN URINE: ICD-10-CM

## 2023-05-31 DIAGNOSIS — O09.299 PREVIOUS STILLBIRTH OR DEMISE, ANTEPARTUM: ICD-10-CM

## 2023-05-31 DIAGNOSIS — O35.EXX0 PYELECTASIS OF FETUS ON PRENATAL ULTRASOUND: Primary | ICD-10-CM

## 2023-05-31 LAB
GLUCOSE UR STRIP-MCNC: NEGATIVE MG/DL
PROT UR STRIP-MCNC: NEGATIVE MG/DL

## 2023-05-31 RX ORDER — PNV,CALCIUM 72/IRON/FOLIC ACID 27 MG-1 MG
1 TABLET ORAL DAILY
Qty: 90 TABLET | Refills: 2 | Status: SHIPPED | OUTPATIENT
Start: 2023-05-31

## 2023-05-31 RX ORDER — PROMETHAZINE HYDROCHLORIDE 12.5 MG/1
12.5 TABLET ORAL EVERY 6 HOURS PRN
COMMUNITY

## 2023-05-31 NOTE — PROGRESS NOTES
Chief Complaint   Patient presents with   • Routine Prenatal Visit     HPI- Pt is 28 y.o.  at 28w5d here for prenatal visit.  She is doing well and has no complaints.  She reports good fetal movement.    ROS-     - No vaginal bleeding    GI- No abdominal pain    /74   Wt 131 kg (288 lb)   LMP 2022 (Approximate)   BMI 47.93 kg/m²   Exam - See flow sheet    Fetal heart rate is normal    Assessment-  Diagnoses and all orders for this visit:    Pyelectasis of fetus on prenatal ultrasound  -     US Fetal Biophysical Profile;Without Non-Stress Testing; Standing    Screening for blood or protein in urine  -     POC Urinalysis Dipstick    Previous stillbirth or demise, antepartum  -     US Fetal Biophysical Profile;Without Non-Stress Testing; Standing    28 weeks gestation of pregnancy    Other orders  -     promethazine (PHENERGAN) 12.5 MG tablet; Take 1 tablet by mouth Every 6 (Six) Hours As Needed for Nausea or Vomiting.      She is doing her glucose test today.  She is scheduled in 2 weeks with maternal-fetal medicine with ultrasound.  I will see her back in 3 weeks and we will start  testing at 32 weeks.

## 2023-06-01 ENCOUNTER — TELEPHONE (OUTPATIENT)
Dept: OBSTETRICS AND GYNECOLOGY | Facility: CLINIC | Age: 28
End: 2023-06-01

## 2023-06-01 LAB
ERYTHROCYTE [DISTWIDTH] IN BLOOD BY AUTOMATED COUNT: 13.6 % (ref 12.3–15.4)
GLUCOSE 1H P 50 G GLC PO SERPL-MCNC: 111 MG/DL (ref 65–139)
HCT VFR BLD AUTO: 34.3 % (ref 34–46.6)
HGB BLD-MCNC: 11.5 G/DL (ref 12–15.9)
MCH RBC QN AUTO: 28.8 PG (ref 26.6–33)
MCHC RBC AUTO-ENTMCNC: 33.5 G/DL (ref 31.5–35.7)
MCV RBC AUTO: 85.8 FL (ref 79–97)
PLATELET # BLD AUTO: 247 10*3/MM3 (ref 140–450)
RBC # BLD AUTO: 4 10*6/MM3 (ref 3.77–5.28)
WBC # BLD AUTO: 9.82 10*3/MM3 (ref 3.4–10.8)

## 2023-06-01 NOTE — TELEPHONE ENCOUNTER
PROVIDER: DR. WELLER    Caller: Krishna Clarke    Relationship: Self    Best call back number: [phone]    What form or medical record are you requesting: LETTER - PT NEEDS A LETTER IN HER MYCHART STATING THE FOLLOWING:  PROOF OF PREG, DUE DATE,  GENDER OF THE BABY    Who is requesting this form or medical record from you: HOUSING    How would you like to receive the form or medical records (pick-up, mail, fax):   If fax, what is the fax number:   If mail, what is the address:   If pick-up, provide patient with address and location details    Timeframe paperwork needed: TODAY IF POSS    Additional notes:
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
## 2023-06-14 ENCOUNTER — OFFICE VISIT (OUTPATIENT)
Dept: OBSTETRICS AND GYNECOLOGY | Facility: CLINIC | Age: 28
End: 2023-06-14
Payer: COMMERCIAL

## 2023-06-14 ENCOUNTER — HOSPITAL ENCOUNTER (OUTPATIENT)
Dept: ULTRASOUND IMAGING | Facility: HOSPITAL | Age: 28
Discharge: HOME | End: 2023-06-14
Admitting: OBSTETRICS & GYNECOLOGY
Payer: COMMERCIAL

## 2023-06-14 VITALS
BODY MASS INDEX: 48.72 KG/M2 | DIASTOLIC BLOOD PRESSURE: 55 MMHG | HEART RATE: 97 BPM | SYSTOLIC BLOOD PRESSURE: 108 MMHG | TEMPERATURE: 97.7 F | RESPIRATION RATE: 16 BRPM | WEIGHT: 292.4 LBS | HEIGHT: 65 IN

## 2023-06-14 DIAGNOSIS — O09.299 PREVIOUS STILLBIRTH OR DEMISE, ANTEPARTUM: ICD-10-CM

## 2023-06-14 DIAGNOSIS — O35.EXX0 PYELECTASIS OF FETUS ON PRENATAL ULTRASOUND: Primary | ICD-10-CM

## 2023-06-14 PROCEDURE — 76816 OB US FOLLOW-UP PER FETUS: CPT

## 2023-06-14 PROCEDURE — 76819 FETAL BIOPHYS PROFIL W/O NST: CPT
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
## 2023-06-14 NOTE — PROGRESS NOTES
Pt reports that she is doing well and denies vaginal bleeding, cramping, contractions or LOF at this time. Reports active fetal movement. Reviewed when to call OB office or present to L&D for evaluation with symptoms such as decreased fetal movement, vaginal bleeding, LOF or ctxs. Pt verbalized understanding. Denies HA, visual changes or epigastric pain. Denies any additional complaints at time of appointment. Next OB appointment scheduled for 06/21    Vitals:    06/14/23 0856   BP: 108/55   Pulse: 97   Resp: 16   Temp: 97.7 °F (36.5 °C)

## 2023-07-18 PROBLEM — Z71.89 ADVANCED CARE PLANNING/COUNSELING DISCUSSION: Status: ACTIVE | Noted: 2023-07-18

## 2023-07-24 ENCOUNTER — ROUTINE PRENATAL (OUTPATIENT)
Dept: OBSTETRICS AND GYNECOLOGY | Facility: CLINIC | Age: 28
End: 2023-07-24
Payer: COMMERCIAL

## 2023-07-24 VITALS — BODY MASS INDEX: 49.63 KG/M2 | SYSTOLIC BLOOD PRESSURE: 117 MMHG | WEIGHT: 293 LBS | DIASTOLIC BLOOD PRESSURE: 74 MMHG

## 2023-07-24 DIAGNOSIS — O09.299 PREVIOUS STILLBIRTH OR DEMISE, ANTEPARTUM: ICD-10-CM

## 2023-07-24 DIAGNOSIS — Z13.89 SCREENING FOR BLOOD OR PROTEIN IN URINE: ICD-10-CM

## 2023-07-24 DIAGNOSIS — Z3A.36 36 WEEKS GESTATION OF PREGNANCY: ICD-10-CM

## 2023-07-24 DIAGNOSIS — Z86.19 HISTORY OF HERPES GENITALIS: ICD-10-CM

## 2023-07-24 DIAGNOSIS — Z36.85 ANTENATAL SCREENING FOR STREPTOCOCCUS B: ICD-10-CM

## 2023-07-24 DIAGNOSIS — O35.EXX0 PYELECTASIS OF FETUS ON PRENATAL ULTRASOUND: Primary | ICD-10-CM

## 2023-07-24 LAB
GLUCOSE UR STRIP-MCNC: NEGATIVE MG/DL
PROT UR STRIP-MCNC: NEGATIVE MG/DL

## 2023-07-24 PROCEDURE — 99214 OFFICE O/P EST MOD 30 MIN: CPT | Performed by: OBSTETRICS & GYNECOLOGY

## 2023-07-24 RX ORDER — ACYCLOVIR 400 MG/1
400 TABLET ORAL 3 TIMES DAILY
Qty: 60 TABLET | Refills: 2 | Status: SHIPPED | OUTPATIENT
Start: 2023-07-24

## 2023-07-24 NOTE — PROGRESS NOTES
Chief Complaint   Patient presents with    Routine Prenatal Visit     HPI- Pt is 28 y.o.  at 36w3d here for prenatal visit.  She has no complaints today and reports good fetal movement.  She denies any painful contractions, leaking, or bleeding.    ROS-     - No vaginal bleeding    GI- No abdominal pain    /74   Wt 135 kg (298 lb 4 oz)   LMP 2022 (Approximate)   BMI 49.63 kg/m²   Exam - See flow sheet    Fetal heart rate is normal    Assessment-  Diagnoses and all orders for this visit:    Pyelectasis of fetus on prenatal ultrasound    Screening for blood or protein in urine  -     POC Urinalysis Dipstick    Previous stillbirth or demise, antepartum    History of herpes genitalis  -     acyclovir (ZOVIRAX) 400 MG tablet; Take 1 tablet by mouth 3 (Three) Times a Day.    36 weeks gestation of pregnancy     screening for streptococcus B  -     Group B Streptococcus Culture - Swab, Vaginal/Rectum     testing remains reassuring.  She will start suppressive acyclovir for history of genital herpes.  GBS culture was obtained and she will continue with weekly follow-ups.

## 2023-07-28 PROBLEM — B95.1 POSITIVE GBS TEST: Status: ACTIVE | Noted: 2023-07-28

## 2023-07-28 LAB — B-HEM STREP SPEC QL CULT: POSITIVE

## 2023-07-31 ENCOUNTER — ROUTINE PRENATAL (OUTPATIENT)
Dept: OBSTETRICS AND GYNECOLOGY | Facility: CLINIC | Age: 28
End: 2023-07-31
Payer: COMMERCIAL

## 2023-07-31 VITALS — BODY MASS INDEX: 50.22 KG/M2 | WEIGHT: 293 LBS | SYSTOLIC BLOOD PRESSURE: 109 MMHG | DIASTOLIC BLOOD PRESSURE: 75 MMHG

## 2023-07-31 DIAGNOSIS — Z86.19 HISTORY OF HERPES GENITALIS: ICD-10-CM

## 2023-07-31 DIAGNOSIS — Z13.89 SCREENING FOR BLOOD OR PROTEIN IN URINE: ICD-10-CM

## 2023-07-31 DIAGNOSIS — O09.299 PREVIOUS STILLBIRTH OR DEMISE, ANTEPARTUM: ICD-10-CM

## 2023-07-31 DIAGNOSIS — O35.EXX0 PYELECTASIS OF FETUS ON PRENATAL ULTRASOUND: Primary | ICD-10-CM

## 2023-07-31 DIAGNOSIS — B95.1 POSITIVE GBS TEST: ICD-10-CM

## 2023-07-31 DIAGNOSIS — Z3A.37 37 WEEKS GESTATION OF PREGNANCY: ICD-10-CM

## 2023-07-31 LAB
GLUCOSE UR STRIP-MCNC: NEGATIVE MG/DL
PROT UR STRIP-MCNC: ABNORMAL MG/DL

## 2023-07-31 PROCEDURE — 99214 OFFICE O/P EST MOD 30 MIN: CPT | Performed by: OBSTETRICS & GYNECOLOGY

## 2023-08-07 ENCOUNTER — TELEPHONE (OUTPATIENT)
Dept: OBSTETRICS AND GYNECOLOGY | Facility: CLINIC | Age: 28
End: 2023-08-07

## 2023-08-07 ENCOUNTER — ROUTINE PRENATAL (OUTPATIENT)
Dept: OBSTETRICS AND GYNECOLOGY | Facility: CLINIC | Age: 28
End: 2023-08-07
Payer: COMMERCIAL

## 2023-08-07 VITALS — DIASTOLIC BLOOD PRESSURE: 73 MMHG | SYSTOLIC BLOOD PRESSURE: 117 MMHG | WEIGHT: 293 LBS | BODY MASS INDEX: 50.02 KG/M2

## 2023-08-07 DIAGNOSIS — O09.299 PREVIOUS STILLBIRTH OR DEMISE, ANTEPARTUM: Primary | ICD-10-CM

## 2023-08-07 DIAGNOSIS — Z86.19 HISTORY OF HERPES GENITALIS: ICD-10-CM

## 2023-08-07 DIAGNOSIS — B95.1 POSITIVE GBS TEST: ICD-10-CM

## 2023-08-07 DIAGNOSIS — Z3A.38 38 WEEKS GESTATION OF PREGNANCY: ICD-10-CM

## 2023-08-07 DIAGNOSIS — O35.EXX0 PYELECTASIS OF FETUS ON PRENATAL ULTRASOUND: ICD-10-CM

## 2023-08-07 DIAGNOSIS — Z13.89 SCREENING FOR BLOOD OR PROTEIN IN URINE: ICD-10-CM

## 2023-08-07 LAB
GLUCOSE UR STRIP-MCNC: NEGATIVE MG/DL
PROT UR STRIP-MCNC: NEGATIVE MG/DL

## 2023-08-07 NOTE — PROGRESS NOTES
Chief Complaint   Patient presents with    Routine Prenatal Visit     HPI- Pt is 28 y.o.  at 38w3d here for prenatal visit.  She has no complaints and reports good fetal movement.  She denies contractions, leaking, or bleeding.    ROS-     - No vaginal bleeding    GI- No abdominal pain    /73   Wt (!) 136 kg (300 lb 9.6 oz)   LMP 2022 (Approximate)   BMI 50.02 kg/mý   Exam - See flow sheet    Fetal heart rate is normal    Assessment-  Diagnoses and all orders for this visit:    Previous stillbirth or demise, antepartum  -     Labor Induction; Future    Pyelectasis of fetus on prenatal ultrasound  -     Labor Induction; Future    History of herpes genitalis    Positive GBS test    38 weeks gestation of pregnancy    Patient cervix is favorable and we will plan Pitocin induction this Friday.  BPP is 8 out of 8.

## 2023-08-10 DIAGNOSIS — O09.299 PREVIOUS STILLBIRTH OR DEMISE, ANTEPARTUM: Primary | ICD-10-CM

## 2023-08-10 RX ORDER — SODIUM CHLORIDE, SODIUM LACTATE, POTASSIUM CHLORIDE, CALCIUM CHLORIDE 600; 310; 30; 20 MG/100ML; MG/100ML; MG/100ML; MG/100ML
125 INJECTION, SOLUTION INTRAVENOUS CONTINUOUS
Status: CANCELLED | OUTPATIENT
Start: 2023-08-10

## 2023-08-10 RX ORDER — OXYTOCIN/0.9 % SODIUM CHLORIDE 30/500 ML
999 PLASTIC BAG, INJECTION (ML) INTRAVENOUS ONCE
Status: CANCELLED | OUTPATIENT
Start: 2023-08-10

## 2023-08-10 RX ORDER — MAGNESIUM CARB/ALUMINUM HYDROX 105-160MG
30 TABLET,CHEWABLE ORAL ONCE
Status: CANCELLED | OUTPATIENT
Start: 2023-08-10 | End: 2023-08-10

## 2023-08-10 RX ORDER — METHYLERGONOVINE MALEATE 0.2 MG/ML
200 INJECTION INTRAVENOUS ONCE AS NEEDED
Status: CANCELLED | OUTPATIENT
Start: 2023-08-10

## 2023-08-10 RX ORDER — ONDANSETRON 4 MG/1
4 TABLET, FILM COATED ORAL EVERY 6 HOURS PRN
Status: CANCELLED | OUTPATIENT
Start: 2023-08-10

## 2023-08-10 RX ORDER — OXYTOCIN/0.9 % SODIUM CHLORIDE 30/500 ML
2-20 PLASTIC BAG, INJECTION (ML) INTRAVENOUS
Status: CANCELLED | OUTPATIENT
Start: 2023-08-10

## 2023-08-10 RX ORDER — OXYTOCIN/0.9 % SODIUM CHLORIDE 30/500 ML
250 PLASTIC BAG, INJECTION (ML) INTRAVENOUS CONTINUOUS
Status: CANCELLED | OUTPATIENT
Start: 2023-08-10 | End: 2023-08-10

## 2023-08-10 RX ORDER — CARBOPROST TROMETHAMINE 250 UG/ML
250 INJECTION, SOLUTION INTRAMUSCULAR
Status: CANCELLED | OUTPATIENT
Start: 2023-08-10

## 2023-08-10 RX ORDER — MISOPROSTOL 100 UG/1
800 TABLET ORAL ONCE AS NEEDED
Status: CANCELLED | OUTPATIENT
Start: 2023-08-10

## 2023-08-10 RX ORDER — ONDANSETRON 2 MG/ML
4 INJECTION INTRAMUSCULAR; INTRAVENOUS EVERY 6 HOURS PRN
Status: CANCELLED | OUTPATIENT
Start: 2023-08-10

## 2023-08-10 RX ORDER — ACETAMINOPHEN 325 MG/1
650 TABLET ORAL EVERY 4 HOURS PRN
Status: CANCELLED | OUTPATIENT
Start: 2023-08-10

## 2023-08-11 ENCOUNTER — ANESTHESIA EVENT (OUTPATIENT)
Dept: LABOR AND DELIVERY | Facility: HOSPITAL | Age: 28
End: 2023-08-11
Payer: COMMERCIAL

## 2023-08-11 ENCOUNTER — HOSPITAL ENCOUNTER (INPATIENT)
Facility: HOSPITAL | Age: 28
LOS: 3 days | Discharge: HOME | End: 2023-08-14
Attending: OBSTETRICS & GYNECOLOGY | Admitting: OBSTETRICS & GYNECOLOGY
Payer: COMMERCIAL

## 2023-08-11 ENCOUNTER — ANESTHESIA (OUTPATIENT)
Dept: LABOR AND DELIVERY | Facility: HOSPITAL | Age: 28
End: 2023-08-11
Payer: COMMERCIAL

## 2023-08-11 ENCOUNTER — HOSPITAL ENCOUNTER (OUTPATIENT)
Dept: LABOR AND DELIVERY | Facility: HOSPITAL | Age: 28
Discharge: HOME | End: 2023-08-11
Payer: COMMERCIAL

## 2023-08-11 DIAGNOSIS — Z98.891 S/P CESAREAN SECTION: Primary | ICD-10-CM

## 2023-08-11 DIAGNOSIS — O09.299 PREVIOUS STILLBIRTH OR DEMISE, ANTEPARTUM: ICD-10-CM

## 2023-08-11 PROBLEM — Z87.59 HISTORY OF STILLBIRTH: Status: ACTIVE | Noted: 2023-08-11

## 2023-08-11 LAB
ABO GROUP BLD: NORMAL
BLD GP AB SCN SERPL QL: NEGATIVE
DEPRECATED RDW RBC AUTO: 44.3 FL (ref 37–54)
ERYTHROCYTE [DISTWIDTH] IN BLOOD BY AUTOMATED COUNT: 14.4 % (ref 12.3–15.4)
HCT VFR BLD AUTO: 28.2 % (ref 34–46.6)
HGB BLD-MCNC: 9.4 G/DL (ref 12–15.9)
MCH RBC QN AUTO: 28.3 PG (ref 26.6–33)
MCHC RBC AUTO-ENTMCNC: 33.3 G/DL (ref 31.5–35.7)
MCV RBC AUTO: 84.9 FL (ref 79–97)
PLATELET # BLD AUTO: 247 10*3/MM3 (ref 140–450)
PMV BLD AUTO: 11.2 FL (ref 6–12)
RBC # BLD AUTO: 3.32 10*6/MM3 (ref 3.77–5.28)
RH BLD: POSITIVE
T&S EXPIRATION DATE: NORMAL
WBC NRBC COR # BLD: 9.77 10*3/MM3 (ref 3.4–10.8)

## 2023-08-11 PROCEDURE — C1755 CATHETER, INTRASPINAL: HCPCS | Performed by: ANESTHESIOLOGY

## 2023-08-11 PROCEDURE — S0260 H&P FOR SURGERY: HCPCS | Performed by: OBSTETRICS & GYNECOLOGY

## 2023-08-11 PROCEDURE — 3E033VJ INTRODUCTION OF OTHER HORMONE INTO PERIPHERAL VEIN, PERCUTANEOUS APPROACH: ICD-10-PCS | Performed by: OBSTETRICS & GYNECOLOGY

## 2023-08-11 PROCEDURE — 25010000002 PENICILLIN G POTASSIUM PER 600000 UNITS: Performed by: OBSTETRICS & GYNECOLOGY

## 2023-08-11 PROCEDURE — 86901 BLOOD TYPING SEROLOGIC RH(D): CPT | Performed by: OBSTETRICS & GYNECOLOGY

## 2023-08-11 PROCEDURE — 86850 RBC ANTIBODY SCREEN: CPT | Performed by: OBSTETRICS & GYNECOLOGY

## 2023-08-11 PROCEDURE — 86900 BLOOD TYPING SEROLOGIC ABO: CPT | Performed by: OBSTETRICS & GYNECOLOGY

## 2023-08-11 PROCEDURE — 85027 COMPLETE CBC AUTOMATED: CPT | Performed by: OBSTETRICS & GYNECOLOGY

## 2023-08-11 RX ORDER — FAMOTIDINE 10 MG/ML
20 INJECTION, SOLUTION INTRAVENOUS ONCE AS NEEDED
Status: COMPLETED | OUTPATIENT
Start: 2023-08-11 | End: 2023-08-12

## 2023-08-11 RX ORDER — LIDOCAINE HYDROCHLORIDE 15 MG/ML
INJECTION, SOLUTION EPIDURAL; INFILTRATION; INTRACAUDAL; PERINEURAL AS NEEDED
Status: DISCONTINUED | OUTPATIENT
Start: 2023-08-11 | End: 2023-08-12 | Stop reason: SURG

## 2023-08-11 RX ORDER — SODIUM CHLORIDE, SODIUM LACTATE, POTASSIUM CHLORIDE, CALCIUM CHLORIDE 600; 310; 30; 20 MG/100ML; MG/100ML; MG/100ML; MG/100ML
125 INJECTION, SOLUTION INTRAVENOUS CONTINUOUS
Status: DISCONTINUED | OUTPATIENT
Start: 2023-08-11 | End: 2023-08-12

## 2023-08-11 RX ORDER — PENICILLIN G 3000000 [IU]/50ML
3 INJECTION, SOLUTION INTRAVENOUS EVERY 4 HOURS
Status: DISCONTINUED | OUTPATIENT
Start: 2023-08-11 | End: 2023-08-12

## 2023-08-11 RX ORDER — DIPHENHYDRAMINE HYDROCHLORIDE 50 MG/ML
12.5 INJECTION INTRAMUSCULAR; INTRAVENOUS EVERY 8 HOURS PRN
Status: DISCONTINUED | OUTPATIENT
Start: 2023-08-11 | End: 2023-08-12

## 2023-08-11 RX ORDER — ONDANSETRON 2 MG/ML
4 INJECTION INTRAMUSCULAR; INTRAVENOUS EVERY 6 HOURS PRN
Status: DISCONTINUED | OUTPATIENT
Start: 2023-08-11 | End: 2023-08-12

## 2023-08-11 RX ORDER — OXYTOCIN/0.9 % SODIUM CHLORIDE 30/500 ML
2-20 PLASTIC BAG, INJECTION (ML) INTRAVENOUS
Status: DISCONTINUED | OUTPATIENT
Start: 2023-08-11 | End: 2023-08-12

## 2023-08-11 RX ORDER — FENTANYL CIT 0.2 MG/100ML-ROPIV 0.2%-NACL 0.9% EPIDURAL INJ 2/0.2 MCG/ML-%
10 SOLUTION INJECTION CONTINUOUS
Status: DISCONTINUED | OUTPATIENT
Start: 2023-08-11 | End: 2023-08-12

## 2023-08-11 RX ORDER — ONDANSETRON 2 MG/ML
4 INJECTION INTRAMUSCULAR; INTRAVENOUS ONCE AS NEEDED
Status: DISCONTINUED | OUTPATIENT
Start: 2023-08-11 | End: 2023-08-12

## 2023-08-11 RX ORDER — EPHEDRINE SULFATE 50 MG/ML
5 INJECTION, SOLUTION INTRAVENOUS
Status: DISCONTINUED | OUTPATIENT
Start: 2023-08-11 | End: 2023-08-12

## 2023-08-11 RX ORDER — ONDANSETRON 4 MG/1
4 TABLET, FILM COATED ORAL EVERY 6 HOURS PRN
Status: DISCONTINUED | OUTPATIENT
Start: 2023-08-11 | End: 2023-08-12

## 2023-08-11 RX ORDER — ACETAMINOPHEN 325 MG/1
650 TABLET ORAL EVERY 4 HOURS PRN
Status: DISCONTINUED | OUTPATIENT
Start: 2023-08-11 | End: 2023-08-12

## 2023-08-11 RX ORDER — MAGNESIUM CARB/ALUMINUM HYDROX 105-160MG
30 TABLET,CHEWABLE ORAL ONCE
Status: DISCONTINUED | OUTPATIENT
Start: 2023-08-11 | End: 2023-08-12

## 2023-08-11 RX ADMIN — LIDOCAINE HYDROCHLORIDE 2 ML: 15 INJECTION, SOLUTION EPIDURAL; INFILTRATION; INTRACAUDAL; PERINEURAL at 15:24

## 2023-08-11 RX ADMIN — PENICILLIN G 3 MILLION UNITS: 3000000 INJECTION, SOLUTION INTRAVENOUS at 17:58

## 2023-08-11 RX ADMIN — LIDOCAINE HYDROCHLORIDE 3 ML: 15 INJECTION, SOLUTION EPIDURAL; INFILTRATION; INTRACAUDAL; PERINEURAL at 15:11

## 2023-08-11 RX ADMIN — EPHEDRINE SULFATE 5 MG: 50 INJECTION, SOLUTION INTRAVENOUS at 18:59

## 2023-08-11 RX ADMIN — SODIUM CHLORIDE, POTASSIUM CHLORIDE, SODIUM LACTATE AND CALCIUM CHLORIDE 1000 ML: 600; 310; 30; 20 INJECTION, SOLUTION INTRAVENOUS at 14:55

## 2023-08-11 RX ADMIN — Medication 2 MILLI-UNITS/MIN: at 11:28

## 2023-08-11 RX ADMIN — SODIUM CHLORIDE, POTASSIUM CHLORIDE, SODIUM LACTATE AND CALCIUM CHLORIDE 125 ML/HR: 600; 310; 30; 20 INJECTION, SOLUTION INTRAVENOUS at 09:50

## 2023-08-11 RX ADMIN — LIDOCAINE HYDROCHLORIDE 3 ML: 15 INJECTION, SOLUTION EPIDURAL; INFILTRATION; INTRACAUDAL; PERINEURAL at 15:20

## 2023-08-11 RX ADMIN — LIDOCAINE HYDROCHLORIDE 3 ML: 15 INJECTION, SOLUTION EPIDURAL; INFILTRATION; INTRACAUDAL; PERINEURAL at 15:15

## 2023-08-11 RX ADMIN — PENICILLIN G POTASSIUM 5 MILLION UNITS: 5000000 INJECTION, POWDER, FOR SOLUTION INTRAMUSCULAR; INTRAVENOUS at 10:03

## 2023-08-11 RX ADMIN — PENICILLIN G 3 MILLION UNITS: 3000000 INJECTION, SOLUTION INTRAVENOUS at 21:45

## 2023-08-11 RX ADMIN — Medication 8 ML/HR: at 15:29

## 2023-08-11 RX ADMIN — PENICILLIN G 3 MILLION UNITS: 3000000 INJECTION, SOLUTION INTRAVENOUS at 14:04

## 2023-08-11 RX ADMIN — EPHEDRINE SULFATE 5 MG: 50 INJECTION, SOLUTION INTRAVENOUS at 21:43

## 2023-08-11 RX ADMIN — SODIUM CHLORIDE, POTASSIUM CHLORIDE, SODIUM LACTATE AND CALCIUM CHLORIDE 125 ML/HR: 600; 310; 30; 20 INJECTION, SOLUTION INTRAVENOUS at 17:57

## 2023-08-11 NOTE — ANESTHESIA PROCEDURE NOTES
Labor Epidural    Pre-sedation assessment completed: 8/11/2023 3:02 PM    Patient reassessed immediately prior to procedure    Patient location during procedure: OB  Start Time: 8/11/2023 3:02 PM  Stop Time: 8/11/2023 3:30 PM  Indication:at surgeon's request  Performed By  Anesthesiologist: Agapito Rico MD  Preanesthetic Checklist  Completed: patient identified, IV checked, site marked, risks and benefits discussed, surgical consent, monitors and equipment checked, pre-op evaluation and timeout performed  Prep:  Pt Position:sitting  Sterile Tech:cap, gloves, gown, mask and sterile barrier  Prep:chlorhexidine gluconate and isopropyl alcohol  Monitoring:blood pressure monitoring and EKG  Epidural Block Procedure:  Approach:midline  Guidance:landmark technique and palpation technique  Location:L3-L4  Needle Type:Tuohy  Needle Gauge:17  Loss of Resistance Medium: saline  Paresthesia: none  Aspiration:negative  Test Dose:negative  Number of Attempts: 1  Post Assessment:  Dressing:occlusive dressing applied and secured with tape  Pt Tolerance:patient tolerated the procedure well with no apparent complications  Complications:no

## 2023-08-11 NOTE — PLAN OF CARE
Goal Outcome Evaluation:  Plan of Care Reviewed With: patient        Progress: improving  Outcome Evaluation: Term IOL, pitocin on 18 at this time. Patient comfortable with epidural at this time. Anticipate vaginal delivery when the time comes.

## 2023-08-11 NOTE — PROGRESS NOTES
Initial FSE fell off when patient moved.  Cervix checked and she is 3 cm, 50% effaced, -3 station.  An IUPC was easily placed with return of clear fluid in the tubing and FSE was applied.  Continue with Pitocin induction.  Tracing is currently category 1.
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
## 2023-08-12 PROBLEM — Z98.891 S/P CESAREAN SECTION: Status: ACTIVE | Noted: 2023-08-12

## 2023-08-12 PROCEDURE — 25010000002 ROPIVACAINE PER 1 MG: Performed by: ANESTHESIOLOGY

## 2023-08-12 PROCEDURE — 25010000002 ONDANSETRON PER 1 MG: Performed by: OBSTETRICS & GYNECOLOGY

## 2023-08-12 PROCEDURE — 25010000002 AZITHROMYCIN PER 500 MG: Performed by: OBSTETRICS & GYNECOLOGY

## 2023-08-12 PROCEDURE — 25010000002 MORPHINE PER 10 MG: Performed by: ANESTHESIOLOGY

## 2023-08-12 PROCEDURE — 25010000002 CEFAZOLIN PER 500 MG: Performed by: OBSTETRICS & GYNECOLOGY

## 2023-08-12 PROCEDURE — 25010000002 FENTANYL CITRATE (PF) 100 MCG/2ML SOLUTION: Performed by: ANESTHESIOLOGY

## 2023-08-12 PROCEDURE — 88307 TISSUE EXAM BY PATHOLOGIST: CPT

## 2023-08-12 PROCEDURE — 25010000002 KETOROLAC TROMETHAMINE PER 15 MG: Performed by: OBSTETRICS & GYNECOLOGY

## 2023-08-12 PROCEDURE — 59514 CESAREAN DELIVERY ONLY: CPT | Performed by: OBSTETRICS & GYNECOLOGY

## 2023-08-12 PROCEDURE — 25010000002 PHENYLEPHRINE 10 MG/ML SOLUTION: Performed by: ANESTHESIOLOGY

## 2023-08-12 PROCEDURE — 25010000002 HYDROMORPHONE PER 4 MG: Performed by: ANESTHESIOLOGY

## 2023-08-12 PROCEDURE — 25010000002 PENICILLIN G POTASSIUM PER 600000 UNITS: Performed by: OBSTETRICS & GYNECOLOGY

## 2023-08-12 PROCEDURE — 25010000002 KETOROLAC TROMETHAMINE PER 15 MG: Performed by: ANESTHESIOLOGY

## 2023-08-12 RX ORDER — CEFAZOLIN SODIUM IN 0.9 % NACL 3 G/100 ML
3 INTRAVENOUS SOLUTION, PIGGYBACK (ML) INTRAVENOUS ONCE
Status: COMPLETED | OUTPATIENT
Start: 2023-08-12 | End: 2023-08-12

## 2023-08-12 RX ORDER — NALOXONE HCL 0.4 MG/ML
0.2 VIAL (ML) INJECTION
Status: DISCONTINUED | OUTPATIENT
Start: 2023-08-12 | End: 2023-08-14 | Stop reason: HOSPADM

## 2023-08-12 RX ORDER — DOCUSATE SODIUM 100 MG/1
100 CAPSULE, LIQUID FILLED ORAL 2 TIMES DAILY PRN
Status: DISCONTINUED | OUTPATIENT
Start: 2023-08-12 | End: 2023-08-14 | Stop reason: HOSPADM

## 2023-08-12 RX ORDER — CITRIC ACID/SODIUM CITRATE 334-500MG
30 SOLUTION, ORAL ORAL ONCE
Status: DISCONTINUED | OUTPATIENT
Start: 2023-08-12 | End: 2023-08-12

## 2023-08-12 RX ORDER — DIPHENHYDRAMINE HYDROCHLORIDE 50 MG/ML
25 INJECTION INTRAMUSCULAR; INTRAVENOUS EVERY 4 HOURS PRN
Status: DISCONTINUED | OUTPATIENT
Start: 2023-08-12 | End: 2023-08-14 | Stop reason: HOSPADM

## 2023-08-12 RX ORDER — IBUPROFEN 600 MG/1
600 TABLET ORAL EVERY 6 HOURS
Status: DISCONTINUED | OUTPATIENT
Start: 2023-08-13 | End: 2023-08-14 | Stop reason: HOSPADM

## 2023-08-12 RX ORDER — DIPHENHYDRAMINE HCL 25 MG
25 CAPSULE ORAL EVERY 4 HOURS PRN
Status: DISCONTINUED | OUTPATIENT
Start: 2023-08-12 | End: 2023-08-14 | Stop reason: HOSPADM

## 2023-08-12 RX ORDER — ACETAMINOPHEN 500 MG
1000 TABLET ORAL EVERY 6 HOURS
Status: COMPLETED | OUTPATIENT
Start: 2023-08-12 | End: 2023-08-13

## 2023-08-12 RX ORDER — DROPERIDOL 2.5 MG/ML
0.62 INJECTION, SOLUTION INTRAMUSCULAR; INTRAVENOUS
Status: DISCONTINUED | OUTPATIENT
Start: 2023-08-12 | End: 2023-08-14 | Stop reason: HOSPADM

## 2023-08-12 RX ORDER — OXYCODONE HYDROCHLORIDE 5 MG/1
5 TABLET ORAL EVERY 4 HOURS PRN
Status: DISCONTINUED | OUTPATIENT
Start: 2023-08-12 | End: 2023-08-14 | Stop reason: HOSPADM

## 2023-08-12 RX ORDER — HYDROXYZINE 50 MG/1
50 TABLET, FILM COATED ORAL EVERY 6 HOURS PRN
Status: DISCONTINUED | OUTPATIENT
Start: 2023-08-12 | End: 2023-08-14 | Stop reason: HOSPADM

## 2023-08-12 RX ORDER — ONDANSETRON 4 MG/1
4 TABLET, FILM COATED ORAL EVERY 8 HOURS PRN
Status: DISCONTINUED | OUTPATIENT
Start: 2023-08-12 | End: 2023-08-14 | Stop reason: HOSPADM

## 2023-08-12 RX ORDER — CARBOPROST TROMETHAMINE 250 UG/ML
250 INJECTION, SOLUTION INTRAMUSCULAR
Status: DISCONTINUED | OUTPATIENT
Start: 2023-08-12 | End: 2023-08-12 | Stop reason: HOSPADM

## 2023-08-12 RX ORDER — MORPHINE SULFATE 1 MG/ML
INJECTION, SOLUTION EPIDURAL; INTRATHECAL; INTRAVENOUS AS NEEDED
Status: DISCONTINUED | OUTPATIENT
Start: 2023-08-12 | End: 2023-08-12 | Stop reason: SURG

## 2023-08-12 RX ORDER — KETOROLAC TROMETHAMINE 30 MG/ML
INJECTION, SOLUTION INTRAMUSCULAR; INTRAVENOUS AS NEEDED
Status: DISCONTINUED | OUTPATIENT
Start: 2023-08-12 | End: 2023-08-12 | Stop reason: SURG

## 2023-08-12 RX ORDER — KETOROLAC TROMETHAMINE 15 MG/ML
15 INJECTION, SOLUTION INTRAMUSCULAR; INTRAVENOUS EVERY 6 HOURS
Status: COMPLETED | OUTPATIENT
Start: 2023-08-12 | End: 2023-08-13

## 2023-08-12 RX ORDER — ROPIVACAINE HYDROCHLORIDE 2 MG/ML
INJECTION, SOLUTION EPIDURAL; INFILTRATION; PERINEURAL AS NEEDED
Status: DISCONTINUED | OUTPATIENT
Start: 2023-08-12 | End: 2023-08-12 | Stop reason: SURG

## 2023-08-12 RX ORDER — METHYLERGONOVINE MALEATE 0.2 MG/ML
200 INJECTION INTRAVENOUS ONCE AS NEEDED
Status: DISCONTINUED | OUTPATIENT
Start: 2023-08-12 | End: 2023-08-12

## 2023-08-12 RX ORDER — ONDANSETRON 2 MG/ML
4 INJECTION INTRAMUSCULAR; INTRAVENOUS ONCE AS NEEDED
Status: DISCONTINUED | OUTPATIENT
Start: 2023-08-12 | End: 2023-08-14 | Stop reason: HOSPADM

## 2023-08-12 RX ORDER — KETOROLAC TROMETHAMINE 30 MG/ML
30 INJECTION, SOLUTION INTRAMUSCULAR; INTRAVENOUS ONCE
Status: DISCONTINUED | OUTPATIENT
Start: 2023-08-12 | End: 2023-08-12

## 2023-08-12 RX ORDER — PHYTONADIONE 1 MG/.5ML
INJECTION, EMULSION INTRAMUSCULAR; INTRAVENOUS; SUBCUTANEOUS
Status: ACTIVE
Start: 2023-08-12 | End: 2023-08-12

## 2023-08-12 RX ORDER — OXYTOCIN/0.9 % SODIUM CHLORIDE 30/500 ML
999 PLASTIC BAG, INJECTION (ML) INTRAVENOUS ONCE
Status: DISCONTINUED | OUTPATIENT
Start: 2023-08-12 | End: 2023-08-12

## 2023-08-12 RX ORDER — MISOPROSTOL 200 UG/1
800 TABLET ORAL ONCE AS NEEDED
Status: DISCONTINUED | OUTPATIENT
Start: 2023-08-12 | End: 2023-08-12

## 2023-08-12 RX ORDER — OXYTOCIN/0.9 % SODIUM CHLORIDE 30/500 ML
125 PLASTIC BAG, INJECTION (ML) INTRAVENOUS CONTINUOUS PRN
Status: COMPLETED | OUTPATIENT
Start: 2023-08-12 | End: 2023-08-12

## 2023-08-12 RX ORDER — FENTANYL CITRATE 50 UG/ML
INJECTION, SOLUTION INTRAMUSCULAR; INTRAVENOUS AS NEEDED
Status: DISCONTINUED | OUTPATIENT
Start: 2023-08-12 | End: 2023-08-12 | Stop reason: SURG

## 2023-08-12 RX ORDER — ENOXAPARIN SODIUM 100 MG/ML
40 INJECTION SUBCUTANEOUS DAILY
Status: DISCONTINUED | OUTPATIENT
Start: 2023-08-13 | End: 2023-08-14 | Stop reason: HOSPADM

## 2023-08-12 RX ORDER — ERYTHROMYCIN 5 MG/G
OINTMENT OPHTHALMIC
Status: ACTIVE
Start: 2023-08-12 | End: 2023-08-12

## 2023-08-12 RX ORDER — ACETAMINOPHEN 325 MG/1
650 TABLET ORAL EVERY 6 HOURS
Status: DISCONTINUED | OUTPATIENT
Start: 2023-08-13 | End: 2023-08-14 | Stop reason: HOSPADM

## 2023-08-12 RX ORDER — OXYTOCIN/0.9 % SODIUM CHLORIDE 30/500 ML
250 PLASTIC BAG, INJECTION (ML) INTRAVENOUS CONTINUOUS
Status: DISCONTINUED | OUTPATIENT
Start: 2023-08-12 | End: 2023-08-12

## 2023-08-12 RX ORDER — MORPHINE SULFATE 2 MG/ML
2 INJECTION, SOLUTION INTRAMUSCULAR; INTRAVENOUS
Status: ACTIVE | OUTPATIENT
Start: 2023-08-12 | End: 2023-08-12

## 2023-08-12 RX ORDER — FAMOTIDINE 10 MG/ML
20 INJECTION, SOLUTION INTRAVENOUS ONCE AS NEEDED
Status: COMPLETED | OUTPATIENT
Start: 2023-08-12 | End: 2023-08-12

## 2023-08-12 RX ORDER — ACETAMINOPHEN 500 MG
1000 TABLET ORAL ONCE
Status: DISCONTINUED | OUTPATIENT
Start: 2023-08-12 | End: 2023-08-12

## 2023-08-12 RX ORDER — OXYCODONE HYDROCHLORIDE 10 MG/1
10 TABLET ORAL EVERY 4 HOURS PRN
Status: DISCONTINUED | OUTPATIENT
Start: 2023-08-12 | End: 2023-08-14 | Stop reason: HOSPADM

## 2023-08-12 RX ORDER — PRENATAL VIT/IRON FUM/FOLIC AC 27MG-0.8MG
1 TABLET ORAL DAILY
Status: DISCONTINUED | OUTPATIENT
Start: 2023-08-12 | End: 2023-08-14 | Stop reason: HOSPADM

## 2023-08-12 RX ORDER — PHENYLEPHRINE HYDROCHLORIDE 10 MG/ML
INJECTION INTRAVENOUS AS NEEDED
Status: DISCONTINUED | OUTPATIENT
Start: 2023-08-12 | End: 2023-08-12 | Stop reason: SURG

## 2023-08-12 RX ORDER — HYDROMORPHONE HYDROCHLORIDE 1 MG/ML
0.5 INJECTION, SOLUTION INTRAMUSCULAR; INTRAVENOUS; SUBCUTANEOUS
Status: DISCONTINUED | OUTPATIENT
Start: 2023-08-12 | End: 2023-08-12 | Stop reason: HOSPADM

## 2023-08-12 RX ORDER — HYDROCORTISONE 25 MG/G
CREAM TOPICAL 3 TIMES DAILY PRN
Status: DISCONTINUED | OUTPATIENT
Start: 2023-08-12 | End: 2023-08-14 | Stop reason: HOSPADM

## 2023-08-12 RX ORDER — LIDOCAINE HYDROCHLORIDE 20 MG/ML
INJECTION, SOLUTION EPIDURAL; INFILTRATION; INTRACAUDAL; PERINEURAL AS NEEDED
Status: DISCONTINUED | OUTPATIENT
Start: 2023-08-12 | End: 2023-08-12 | Stop reason: SURG

## 2023-08-12 RX ORDER — PROMETHAZINE HYDROCHLORIDE 12.5 MG/1
12.5 TABLET ORAL EVERY 4 HOURS PRN
Status: DISCONTINUED | OUTPATIENT
Start: 2023-08-12 | End: 2023-08-14 | Stop reason: HOSPADM

## 2023-08-12 RX ORDER — LIDOCAINE HYDROCHLORIDE 20 MG/ML
INJECTION, SOLUTION EPIDURAL; INFILTRATION; INTRACAUDAL; PERINEURAL AS NEEDED
Status: DISCONTINUED | OUTPATIENT
Start: 2023-08-12 | End: 2023-08-12

## 2023-08-12 RX ORDER — ONDANSETRON 2 MG/ML
4 INJECTION INTRAMUSCULAR; INTRAVENOUS EVERY 6 HOURS PRN
Status: DISCONTINUED | OUTPATIENT
Start: 2023-08-12 | End: 2023-08-14 | Stop reason: HOSPADM

## 2023-08-12 RX ADMIN — KETOROLAC TROMETHAMINE 15 MG: 15 INJECTION, SOLUTION INTRAMUSCULAR; INTRAVENOUS at 19:38

## 2023-08-12 RX ADMIN — PENICILLIN G 3 MILLION UNITS: 3000000 INJECTION, SOLUTION INTRAVENOUS at 03:03

## 2023-08-12 RX ADMIN — FAMOTIDINE 20 MG: 10 INJECTION INTRAVENOUS at 10:34

## 2023-08-12 RX ADMIN — SODIUM CHLORIDE, POTASSIUM CHLORIDE, SODIUM LACTATE AND CALCIUM CHLORIDE 125 ML/HR: 600; 310; 30; 20 INJECTION, SOLUTION INTRAVENOUS at 03:35

## 2023-08-12 RX ADMIN — LIDOCAINE HYDROCHLORIDE 5 ML: 20 INJECTION, SOLUTION EPIDURAL; INFILTRATION; INTRACAUDAL; PERINEURAL at 10:48

## 2023-08-12 RX ADMIN — HYDROMORPHONE HYDROCHLORIDE 0.5 MG: 1 INJECTION, SOLUTION INTRAMUSCULAR; INTRAVENOUS; SUBCUTANEOUS at 13:27

## 2023-08-12 RX ADMIN — FAMOTIDINE 20 MG: 10 INJECTION INTRAVENOUS at 03:08

## 2023-08-12 RX ADMIN — FENTANYL CITRATE 12.5 MCG: 50 INJECTION, SOLUTION INTRAMUSCULAR; INTRAVENOUS at 10:43

## 2023-08-12 RX ADMIN — ONDANSETRON 4 MG: 2 INJECTION INTRAMUSCULAR; INTRAVENOUS at 03:07

## 2023-08-12 RX ADMIN — ACETAMINOPHEN 1000 MG: 500 TABLET, FILM COATED ORAL at 16:22

## 2023-08-12 RX ADMIN — Medication 10 ML/HR: at 00:03

## 2023-08-12 RX ADMIN — LIDOCAINE HYDROCHLORIDE 5 ML: 20 INJECTION, SOLUTION EPIDURAL; INFILTRATION; INTRACAUDAL; PERINEURAL at 10:36

## 2023-08-12 RX ADMIN — EPHEDRINE SULFATE 5 MG: 50 INJECTION, SOLUTION INTRAVENOUS at 13:02

## 2023-08-12 RX ADMIN — EPHEDRINE SULFATE 5 MG: 50 INJECTION, SOLUTION INTRAVENOUS at 12:20

## 2023-08-12 RX ADMIN — CEFAZOLIN 3 G: 10 INJECTION, POWDER, FOR SOLUTION INTRAVENOUS at 10:34

## 2023-08-12 RX ADMIN — MORPHINE SULFATE 2 MG: 1 INJECTION, SOLUTION EPIDURAL; INTRATHECAL; INTRAVENOUS at 11:25

## 2023-08-12 RX ADMIN — FENTANYL CITRATE 12.5 MCG: 50 INJECTION, SOLUTION INTRAMUSCULAR; INTRAVENOUS at 10:46

## 2023-08-12 RX ADMIN — LIDOCAINE HYDROCHLORIDE 5 ML: 20 INJECTION, SOLUTION EPIDURAL; INFILTRATION; INTRACAUDAL; PERINEURAL at 10:46

## 2023-08-12 RX ADMIN — PHENYLEPHRINE HYDROCHLORIDE 100 MCG: 10 INJECTION INTRAVENOUS at 11:28

## 2023-08-12 RX ADMIN — AZITHROMYCIN MONOHYDRATE 500 MG: 500 INJECTION, POWDER, LYOPHILIZED, FOR SOLUTION INTRAVENOUS at 11:08

## 2023-08-12 RX ADMIN — Medication 125 ML/HR: at 12:28

## 2023-08-12 RX ADMIN — ROPIVACAINE HYDROCHLORIDE 3 ML: 2 INJECTION, SOLUTION EPIDURAL; INFILTRATION; PERINEURAL at 11:25

## 2023-08-12 RX ADMIN — FENTANYL CITRATE 12.5 MCG: 50 INJECTION, SOLUTION INTRAMUSCULAR; INTRAVENOUS at 10:36

## 2023-08-12 RX ADMIN — FENTANYL CITRATE 50 MCG: 50 INJECTION, SOLUTION INTRAMUSCULAR; INTRAVENOUS at 11:25

## 2023-08-12 RX ADMIN — PHENYLEPHRINE HYDROCHLORIDE 100 MCG: 10 INJECTION INTRAVENOUS at 11:07

## 2023-08-12 RX ADMIN — LIDOCAINE HYDROCHLORIDE 5 ML: 20 INJECTION, SOLUTION EPIDURAL; INFILTRATION; INTRACAUDAL; PERINEURAL at 10:43

## 2023-08-12 RX ADMIN — ACETAMINOPHEN 1000 MG: 500 TABLET, FILM COATED ORAL at 23:03

## 2023-08-12 RX ADMIN — KETOROLAC TROMETHAMINE 30 MG: 30 INJECTION, SOLUTION INTRAMUSCULAR; INTRAVENOUS at 11:28

## 2023-08-12 RX ADMIN — FENTANYL CITRATE 12.5 MCG: 50 INJECTION, SOLUTION INTRAMUSCULAR; INTRAVENOUS at 10:48

## 2023-08-12 NOTE — LACTATION NOTE
Patient called requesting hand pump.  Provided hand pump, cleaning supplies, and lanolin.  Patient states she is familiar with hand pump and has used in the past.  Infant currently breast feeding with nutritive suck.  Reviewed with patient  feeding patterns and that baby having unlimited access to the breast is the best way to encourage full milk supply.  Patient verbalized understanding.  LC number on white board and encouraged to call with any questions.
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
## 2023-08-12 NOTE — ANESTHESIA POSTPROCEDURE EVALUATION
"Patient: Krishna Clarke    Procedure Summary       Date: 23 Room / Location:  [facility] /  [facility]    Anesthesia Start: 1502 Anesthesia Stop: 23 1157    Procedure:  SECTION PRIMARY (Abdomen) Diagnosis:       (Arrest of Descent )      (Failed Vacuum)    Surgeons: Padmini Sims MD Provider: Agapito Rico MD    Anesthesia Type: epidural ASA Status: 3            Anesthesia Type: epidural    Vitals  Vitals Value Taken Time   /64 23 1355   Temp 36.8 øC (98.2 øF) 23 1200   Pulse 72 23 1355   Resp 18 23 1200   SpO2 97 % 23 1354   Vitals shown include unvalidated device data.        Post Anesthesia Care and Evaluation    Patient location during evaluation: bedside  Patient participation: complete - patient participated  Level of consciousness: awake and alert  Pain score: 0  Pain management: adequate    Airway patency: patent  Anesthetic complications: No anesthetic complications    Cardiovascular status: acceptable  Respiratory status: acceptable  Hydration status: acceptable    Comments: /58   Pulse 82   Temp 36.8 øC (98.2 øF) (Oral)   Resp 18   Ht 165.1 cm (65\")   Wt (!) 138 kg (304 lb)   LMP 2022 (Approximate)   SpO2 99%   Breastfeeding Yes   BMI 50.59 kg/mý     "

## 2023-08-12 NOTE — LACTATION NOTE
This note was copied from a baby's chart.  P6, T baby- new admission today. Mom BF each of her previus children for a long time. One if them for 3 years. Informed PT that LC is here to help with BF today until 2000. Offered assistance but mother declined, said she will call later, when baby is due to BF if she needs help. Reports infant is latching well.PT denies any questions and concerns at this time. She will get  hands free PBP from her insurance. Encouraged to call LC if needing further assistance.

## 2023-08-12 NOTE — PLAN OF CARE
Problem: Adult Inpatient Plan of Care  Goal: Plan of Care Review  Outcome: Ongoing, Progressing  Flowsheets (Taken 2023 1338)  Progress: improving  Plan of Care Reviewed With: patient  Outcome Evaluation: Patient in LDR15 is recovering from a LTCD. Fundus firm and midline, pain reported /10 and treated according to MAR. Plan of care discussed and pt verbalized underdstanding. Stable for transfer to mother/baby.  Goal: Patient-Specific Goal (Individualized)  Outcome: Ongoing, Progressing  Goal: Absence of Hospital-Acquired Illness or Injury  Outcome: Ongoing, Progressing  Intervention: Identify and Manage Fall Risk  Recent Flowsheet Documentation  Taken 2023 1200 by Brigida Chaparro RN  Safety Promotion/Fall Prevention: safety round/check completed  Intervention: Prevent and Manage VTE (Venous Thromboembolism) Risk  Recent Flowsheet Documentation  Taken 2023 1200 by Brigida Chaparro RN  VTE Prevention/Management:   bilateral   sequential compression devices on  Goal: Optimal Comfort and Wellbeing  Outcome: Ongoing, Progressing  Intervention: Provide Person-Centered Care  Recent Flowsheet Documentation  Taken 2023 1200 by Brigida Chaparro RN  Trust Relationship/Rapport:   care explained   choices provided   emotional support provided   empathic listening provided   questions answered   questions encouraged  Goal: Readiness for Transition of Care  Outcome: Ongoing, Progressing     Problem:  Fall Injury Risk  Goal: Absence of Fall, Infant Drop and Related Injury  Outcome: Ongoing, Progressing  Intervention: Promote Injury-Free Environment  Recent Flowsheet Documentation  Taken 2023 1200 by Brigida Chaparro RN  Safety Promotion/Fall Prevention: safety round/check completed     Problem: Skin Injury Risk Increased  Goal: Skin Health and Integrity  Outcome: Ongoing, Progressing     Problem: Bleeding ( Delivery)  Goal: Bleeding is Controlled  Outcome: Ongoing,  Progressing     Problem: Change in Fetal Wellbeing ( Delivery)  Goal: Stable Fetal Wellbeing  Outcome: Ongoing, Progressing     Problem: Infection ( Delivery)  Goal: Absence of Infection Signs and Symptoms  Outcome: Ongoing, Progressing     Problem: Respiratory Compromise ( Delivery)  Goal: Effective Oxygenation and Ventilation  Outcome: Ongoing, Progressing   Goal Outcome Evaluation:  Plan of Care Reviewed With: patient        Progress: improving  Outcome Evaluation: Patient in LDR15 is recovering from a LTCD. Fundus firm and midline, pain reported 6/10 and treated according to MAR. Plan of care discussed and pt verbalized underdstanding. Stable for transfer to mother/baby.         Problem: Bleeding (Labor)  Goal: Hemostasis  Outcome: Unable to Meet, Plan Revised     Problem: Change in Fetal Wellbeing (Labor)  Goal: Stable Fetal Wellbeing  Outcome: Unable to Meet, Plan Revised     Problem: Delayed Labor Progression (Labor)  Goal: Effective Progression to Delivery  Outcome: Unable to Meet, Plan Revised     Problem: Infection (Labor)  Goal: Absence of Infection Signs and Symptoms  Outcome: Unable to Meet, Plan Revised     Problem: Labor Pain (Labor)  Goal: Acceptable Pain Control  Outcome: Unable to Meet, Plan Revised     Problem: Uterine Tachysystole (Labor)  Goal: Normal Uterine Contraction Pattern  Outcome: Unable to Meet, Plan Revised

## 2023-08-12 NOTE — PLAN OF CARE
Problem: Adult Inpatient Plan of Care  Goal: Plan of Care Review  Outcome: Ongoing, Progressing  Flowsheets (Taken 8/12/2023 0614)  Progress: improving  Plan of Care Reviewed With: patient  Outcome Evaluation: IOL pitocin is on 16, pt is 9.5/100/+1. pt is comfortable with epidural plans on vaginal delivery  Goal: Patient-Specific Goal (Individualized)  Outcome: Ongoing, Progressing  Goal: Absence of Hospital-Acquired Illness or Injury  Outcome: Ongoing, Progressing  Intervention: Identify and Manage Fall Risk  Recent Flowsheet Documentation  Taken 8/12/2023 0330 by Jennifer Britton, RN  Safety Promotion/Fall Prevention:   safety round/check completed   room organization consistent   nonskid shoes/slippers when out of bed   lighting adjusted   clutter free environment maintained  Taken 8/12/2023 0130 by Jennifer Britton, RN  Safety Promotion/Fall Prevention:   safety round/check completed   room organization consistent   nonskid shoes/slippers when out of bed   lighting adjusted   clutter free environment maintained  Taken 8/11/2023 2337 by Jennifer Britton, RN  Safety Promotion/Fall Prevention:   safety round/check completed   room organization consistent   nonskid shoes/slippers when out of bed   lighting adjusted   clutter free environment maintained  Goal: Optimal Comfort and Wellbeing  Outcome: Ongoing, Progressing  Goal: Readiness for Transition of Care  Outcome: Ongoing, Progressing     Problem: Device-Related Complication Risk (Anesthesia/Analgesia, Neuraxial)  Goal: Safe Infusion Delivery Completion  Outcome: Ongoing, Progressing     Problem: Infection (Anesthesia/Analgesia, Neuraxial)  Goal: Absence of Infection Signs and Symptoms  Outcome: Ongoing, Progressing     Problem: Nausea and Vomiting (Anesthesia/Analgesia, Neuraxial)  Goal: Nausea and Vomiting Relief  Outcome: Ongoing, Progressing     Problem: Pain (Anesthesia/Analgesia, Neuraxial)  Goal: Effective Pain Control  Outcome: Ongoing, Progressing      Problem: Respiratory Compromise (Anesthesia/Analgesia, Neuraxial)  Goal: Effective Oxygenation and Ventilation  Outcome: Ongoing, Progressing     Problem: Sensorimotor Impairment (Anesthesia/Analgesia, Neuraxial)  Goal: Baseline Motor Function  Outcome: Ongoing, Progressing  Intervention: Optimize Sensorimotor Function  Recent Flowsheet Documentation  Taken 2023 0330 by Jennifer Britton RN  Safety Promotion/Fall Prevention:   safety round/check completed   room organization consistent   nonskid shoes/slippers when out of bed   lighting adjusted   clutter free environment maintained  Taken 2023 0130 by Jennifer Britton RN  Safety Promotion/Fall Prevention:   safety round/check completed   room organization consistent   nonskid shoes/slippers when out of bed   lighting adjusted   clutter free environment maintained  Taken 2023 2337 by Jennifer Britton RN  Safety Promotion/Fall Prevention:   safety round/check completed   room organization consistent   nonskid shoes/slippers when out of bed   lighting adjusted   clutter free environment maintained     Problem: Urinary Retention (Anesthesia/Analgesia, Neuraxial)  Goal: Effective Urinary Elimination  Outcome: Ongoing, Progressing     Problem: Bleeding (Labor)  Goal: Hemostasis  Outcome: Ongoing, Progressing     Problem: Change in Fetal Wellbeing (Labor)  Goal: Stable Fetal Wellbeing  Outcome: Ongoing, Progressing     Problem: Delayed Labor Progression (Labor)  Goal: Effective Progression to Delivery  Outcome: Ongoing, Progressing     Problem: Infection (Labor)  Goal: Absence of Infection Signs and Symptoms  Outcome: Ongoing, Progressing     Problem: Labor Pain (Labor)  Goal: Acceptable Pain Control  Outcome: Ongoing, Progressing     Problem: Uterine Tachysystole (Labor)  Goal: Normal Uterine Contraction Pattern  Outcome: Ongoing, Progressing     Problem:  Fall Injury Risk  Goal: Absence of Fall, Infant Drop and Related Injury  Outcome: Ongoing,  Progressing  Intervention: Promote Injury-Free Environment  Recent Flowsheet Documentation  Taken 8/12/2023 0330 by Jennifer Britton, RN  Safety Promotion/Fall Prevention:   safety round/check completed   room organization consistent   nonskid shoes/slippers when out of bed   lighting adjusted   clutter free environment maintained  Taken 8/12/2023 0130 by Jennifer Britton, RN  Safety Promotion/Fall Prevention:   safety round/check completed   room organization consistent   nonskid shoes/slippers when out of bed   lighting adjusted   clutter free environment maintained  Taken 8/11/2023 2337 by Jennifer Britton, RN  Safety Promotion/Fall Prevention:   safety round/check completed   room organization consistent   nonskid shoes/slippers when out of bed   lighting adjusted   clutter free environment maintained   Goal Outcome Evaluation:  Plan of Care Reviewed With: patient        Progress: improving  Outcome Evaluation: IOL pitocin is on 16, pt is 9.5/100/+1. pt is comfortable with epidural plans on vaginal delivery

## 2023-08-13 LAB
BASOPHILS # BLD AUTO: 0.02 10*3/MM3 (ref 0–0.2)
BASOPHILS NFR BLD AUTO: 0.2 % (ref 0–1.5)
DEPRECATED RDW RBC AUTO: 42.5 FL (ref 37–54)
EOSINOPHIL # BLD AUTO: 0.26 10*3/MM3 (ref 0–0.4)
EOSINOPHIL NFR BLD AUTO: 2.8 % (ref 0.3–6.2)
ERYTHROCYTE [DISTWIDTH] IN BLOOD BY AUTOMATED COUNT: 14.2 % (ref 12.3–15.4)
HCT VFR BLD AUTO: 23.8 % (ref 34–46.6)
HGB BLD-MCNC: 8.2 G/DL (ref 12–15.9)
IMM GRANULOCYTES # BLD AUTO: 0.03 10*3/MM3 (ref 0–0.05)
IMM GRANULOCYTES NFR BLD AUTO: 0.3 % (ref 0–0.5)
LYMPHOCYTES # BLD AUTO: 2.77 10*3/MM3 (ref 0.7–3.1)
LYMPHOCYTES NFR BLD AUTO: 29.5 % (ref 19.6–45.3)
MCH RBC QN AUTO: 28.7 PG (ref 26.6–33)
MCHC RBC AUTO-ENTMCNC: 34.5 G/DL (ref 31.5–35.7)
MCV RBC AUTO: 83.2 FL (ref 79–97)
MONOCYTES # BLD AUTO: 0.77 10*3/MM3 (ref 0.1–0.9)
MONOCYTES NFR BLD AUTO: 8.2 % (ref 5–12)
NEUTROPHILS NFR BLD AUTO: 5.54 10*3/MM3 (ref 1.7–7)
NEUTROPHILS NFR BLD AUTO: 59 % (ref 42.7–76)
NRBC BLD AUTO-RTO: 0.1 /100 WBC (ref 0–0.2)
PLATELET # BLD AUTO: 205 10*3/MM3 (ref 140–450)
PMV BLD AUTO: 11.2 FL (ref 6–12)
RBC # BLD AUTO: 2.86 10*6/MM3 (ref 3.77–5.28)
WBC NRBC COR # BLD: 9.39 10*3/MM3 (ref 3.4–10.8)

## 2023-08-13 PROCEDURE — 0503F POSTPARTUM CARE VISIT: CPT | Performed by: OBSTETRICS & GYNECOLOGY

## 2023-08-13 PROCEDURE — 25010000002 ENOXAPARIN PER 10 MG: Performed by: OBSTETRICS & GYNECOLOGY

## 2023-08-13 PROCEDURE — 85025 COMPLETE CBC W/AUTO DIFF WBC: CPT | Performed by: OBSTETRICS & GYNECOLOGY

## 2023-08-13 PROCEDURE — 25010000002 KETOROLAC TROMETHAMINE PER 15 MG: Performed by: OBSTETRICS & GYNECOLOGY

## 2023-08-13 RX ADMIN — ACETAMINOPHEN 650 MG: 325 TABLET ORAL at 22:33

## 2023-08-13 RX ADMIN — OXYCODONE HYDROCHLORIDE 5 MG: 5 TABLET ORAL at 20:30

## 2023-08-13 RX ADMIN — Medication 1 TABLET: at 09:26

## 2023-08-13 RX ADMIN — ENOXAPARIN SODIUM 40 MG: 100 INJECTION SUBCUTANEOUS at 09:26

## 2023-08-13 RX ADMIN — IBUPROFEN 600 MG: 600 TABLET ORAL at 20:26

## 2023-08-13 RX ADMIN — ACETAMINOPHEN 650 MG: 325 TABLET ORAL at 16:58

## 2023-08-13 RX ADMIN — KETOROLAC TROMETHAMINE 15 MG: 15 INJECTION, SOLUTION INTRAMUSCULAR; INTRAVENOUS at 14:08

## 2023-08-13 RX ADMIN — ACETAMINOPHEN 1000 MG: 500 TABLET, FILM COATED ORAL at 04:44

## 2023-08-13 RX ADMIN — KETOROLAC TROMETHAMINE 15 MG: 15 INJECTION, SOLUTION INTRAMUSCULAR; INTRAVENOUS at 03:06

## 2023-08-13 RX ADMIN — ACETAMINOPHEN 1000 MG: 500 TABLET, FILM COATED ORAL at 11:02

## 2023-08-13 RX ADMIN — DOCUSATE SODIUM 100 MG: 100 CAPSULE, LIQUID FILLED ORAL at 20:26

## 2023-08-13 RX ADMIN — KETOROLAC TROMETHAMINE 15 MG: 15 INJECTION, SOLUTION INTRAMUSCULAR; INTRAVENOUS at 09:25

## 2023-08-13 NOTE — LACTATION NOTE
This note was copied from a baby's chart.  Informed PT that LC is here again to help with BF . Mom reports infant is latching well, but she doesn't think is producing much colostrum, so she has been pumping with the HP and also supplementing with formula. PT denies any questions and concerns at this time. Encouraged to call LC if needing further assistance.

## 2023-08-13 NOTE — PLAN OF CARE
Goal Outcome Evaluation:      VSS. Assessment Wdl. Pain is well controlled with tylenol and toradol. Pt plans to shower today. Encouraged pt to ambulate.

## 2023-08-13 NOTE — PROGRESS NOTES
Section Progress Note    Assessment & Plan     Status post  section: Doing well postoperatively.     1) postoperative state--continue routine postoperative care.  She was advised to shower and remove her bandage today.  Awaiting flatus.  2) anemia--acute on chronic secondary to surgery.  We will recheck hemoglobin in the a.m.  She is currently asymptomatic.  3) maternal obesity--on Lovenox for VTE prophylaxis    Rh status: A pos  Rubella: Immune  Gender: male--desires circumcision.  Discussed risks and elective nature of the procedure and mom wishes to proceed    Subjective     Postpartum Day 1:  Delivery    The patient feels well. The patient denies emotional concerns. Pain is well controlled with current medications. The baby iswell.Urinary output is adequate. The patient is ambulating well. The patient is tolerating a normal diet. Patient denies flatus.    Objective     Vital signs in last 24 hours:  Temp:  [97.8 øF (36.6 øC)-98.2 øF (36.8 øC)] 98.2 øF (36.8 øC)  Heart Rate:  [] 66  Resp:  [16-20] 16  BP: ()/() 104/68      General:    alert, appears stated age, and cooperative   Bowel Sounds:  active   Lochia:  appropriate   Uterine Fundus:   firm   Incision:  Pressure dressing in place   DVT Evaluation:  No evidence of DVT seen on physical exam.     Lab Results   Component Value Date    WBC 9.39 2023    HGB 8.2 (L) 2023    HCT 23.8 (L) 2023    MCV 83.2 2023     2023         Padmini Sims MD  2023  10:24 EDT

## 2023-08-14 ENCOUNTER — PATIENT OUTREACH (OUTPATIENT)
Dept: LABOR AND DELIVERY | Facility: HOSPITAL | Age: 28
End: 2023-08-14
Payer: COMMERCIAL

## 2023-08-14 VITALS
HEART RATE: 77 BPM | HEIGHT: 65 IN | DIASTOLIC BLOOD PRESSURE: 94 MMHG | WEIGHT: 293 LBS | OXYGEN SATURATION: 95 % | BODY MASS INDEX: 48.82 KG/M2 | TEMPERATURE: 98.1 F | RESPIRATION RATE: 16 BRPM | SYSTOLIC BLOOD PRESSURE: 141 MMHG

## 2023-08-14 LAB
DEPRECATED RDW RBC AUTO: 43.9 FL (ref 37–54)
ERYTHROCYTE [DISTWIDTH] IN BLOOD BY AUTOMATED COUNT: 14.5 % (ref 12.3–15.4)
HCT VFR BLD AUTO: 25.6 % (ref 34–46.6)
HGB BLD-MCNC: 8.6 G/DL (ref 12–15.9)
MCH RBC QN AUTO: 28.1 PG (ref 26.6–33)
MCHC RBC AUTO-ENTMCNC: 33.6 G/DL (ref 31.5–35.7)
MCV RBC AUTO: 83.7 FL (ref 79–97)
PLATELET # BLD AUTO: 229 10*3/MM3 (ref 140–450)
PMV BLD AUTO: 10.8 FL (ref 6–12)
RBC # BLD AUTO: 3.06 10*6/MM3 (ref 3.77–5.28)
WBC NRBC COR # BLD: 8.11 10*3/MM3 (ref 3.4–10.8)

## 2023-08-14 PROCEDURE — 25010000002 ENOXAPARIN PER 10 MG: Performed by: OBSTETRICS & GYNECOLOGY

## 2023-08-14 PROCEDURE — 85027 COMPLETE CBC AUTOMATED: CPT | Performed by: OBSTETRICS & GYNECOLOGY

## 2023-08-14 RX ORDER — OXYCODONE HYDROCHLORIDE AND ACETAMINOPHEN 5; 325 MG/1; MG/1
1 TABLET ORAL EVERY 4 HOURS PRN
Qty: 20 TABLET | Refills: 0 | Status: SHIPPED | OUTPATIENT
Start: 2023-08-14 | End: 2023-08-21

## 2023-08-14 RX ORDER — IBUPROFEN 600 MG/1
600 TABLET ORAL EVERY 6 HOURS
Qty: 40 TABLET | Refills: 1 | Status: SHIPPED | OUTPATIENT
Start: 2023-08-14

## 2023-08-14 RX ADMIN — OXYCODONE HYDROCHLORIDE 5 MG: 5 TABLET ORAL at 01:23

## 2023-08-14 RX ADMIN — IBUPROFEN 600 MG: 600 TABLET ORAL at 08:23

## 2023-08-14 RX ADMIN — ACETAMINOPHEN 650 MG: 325 TABLET ORAL at 12:22

## 2023-08-14 RX ADMIN — Medication 1 TABLET: at 08:23

## 2023-08-14 RX ADMIN — ACETAMINOPHEN 650 MG: 325 TABLET ORAL at 05:31

## 2023-08-14 RX ADMIN — OXYCODONE HYDROCHLORIDE 10 MG: 10 TABLET ORAL at 05:36

## 2023-08-14 RX ADMIN — ENOXAPARIN SODIUM 40 MG: 100 INJECTION SUBCUTANEOUS at 08:22

## 2023-08-14 RX ADMIN — DOCUSATE SODIUM 100 MG: 100 CAPSULE, LIQUID FILLED ORAL at 08:23

## 2023-08-14 RX ADMIN — OXYCODONE HYDROCHLORIDE 10 MG: 10 TABLET ORAL at 12:21

## 2023-08-14 RX ADMIN — IBUPROFEN 600 MG: 600 TABLET ORAL at 01:23

## 2023-08-14 NOTE — LACTATION NOTE
Pt reports baby BF for 5 min before taking formula. Pt is insurance pumping but not getting colostrum out yet. Pt resting now. Encouraged to call LC as needed. Faxed script for personal pump    Lactation Consult Note    Evaluation Completed: 2023 07:50 EDT  Patient Name: Krishna Clarke  :  1995  MRN:  [id number]     REFERRAL  INFORMATION:                                         DELIVERY HISTORY:        Skin to skin initiation date/time:      Skin to skin end date/time:           MATERNAL ASSESSMENT:                               INFANT ASSESSMENT:  Information for the patient's :  Hillary Clarke [[id number]]   No past medical history on file.                                                                                                   MATERNAL INFANT FEEDING:                                                                       EQUIPMENT TYPE:                                 BREAST PUMPING:          LACTATION REFERRALS:

## 2023-08-14 NOTE — LACTATION NOTE
Gave pt personal pump  Lactation Consult Note    Evaluation Completed: 2023 11:31 EDT  Patient Name: Krishna Clarke  :  1995  MRN:  [id number]     REFERRAL  INFORMATION:                                         DELIVERY HISTORY:        Skin to skin initiation date/time:      Skin to skin end date/time:           MATERNAL ASSESSMENT:                               INFANT ASSESSMENT:  Information for the patient's :  Charis ClarkeElizabethmarivel [[id number]]   No past medical history on file.                                                                                                   MATERNAL INFANT FEEDING:                                                                       EQUIPMENT TYPE:                                 BREAST PUMPING:          LACTATION REFERRALS:

## 2023-08-14 NOTE — NURSING NOTE
Lactation seen and started pt on a hospital grade bst pump per pt request. Pt is currently bot feeding and was using hand pump.

## 2023-08-14 NOTE — DISCHARGE SUMMARY
Date of Discharge:  2023    Discharge Diagnosis: Term     Presenting Problem/History of Present Illness  Active Hospital Problems    Diagnosis  POA    **S/P  section [Z98.891]  Not Applicable    History of stillbirth [Z87.59]  Not Applicable    Positive GBS test [B95.1]  Yes    Pyelectasis of fetus on prenatal ultrasound [O35.EXX0]  Yes    Previous stillbirth or demise, antepartum [O09.299]  Not Applicable    History of herpes genitalis [Z86.19]  Yes      Resolved Hospital Problems   No resolved problems to display.          Hospital Course  Patient is a 28 y.o. female presented with 39-week gestation for elective induction of labor.  She had failed induction with failure to progress in second stage of labor.  Please see separate history and physical and operative summary for details.  She underwent  section on the morning of 2023.  She did well with that.  She recovered well and was tolerating oral pain medication, ambulating, voiding and desiring discharge home on postop day 2.  Her hemoglobin went from 9.4-8.6 postoperatively..  She was asymptomatic with this and is tolerating oral iron and will be sent home on that.    Procedures Performed    Procedure(s):   SECTION PRIMARY  -------------------       Consults:   Consults       No orders found from 2023 to 2023.            Pertinent Test Results: labs: Postop hemoglobin 8.6    Condition on Discharge: Stable    Vital Signs  Temp:  [97.9 øF (36.6 øC)-98.4 øF (36.9 øC)] 98 øF (36.7 øC)  Heart Rate:  [71-88] 79  Resp:  [16] 16  BP: (112-130)/(58-83) 113/58    Physical Exam:     General Appearance:    Alert, cooperative, in no acute distress   Head:    Normocephalic, without obvious abnormality, atraumatic   Eyes:            Lids and lashes normal, conjunctivae and sclerae normal, no   icterus, no pallor, corneas clear, PERRLA   Ears:    Ears appear intact with no abnormalities noted   Throat:   No oral lesions,  no thrush, oral mucosa moist   Neck:   No adenopathy, supple, trachea midline, no thyromegaly, no     carotid bruit, no JVD   Back:     No kyphosis present, no scoliosis present, no skin lesions,       erythema or scars, no tenderness to percussion or                   palpation,   range of motion normal   Lungs:     Clear to auscultation,respirations regular, even and                   unlabored    Heart:    Regular rhythm and normal rate, normal S1 and S2, no            murmur, no gallop, no rub, no click   Breast Exam:    Deferred   Abdomen:     Normal bowel sounds, no masses, no organomegaly, soft        non-tender, non-distended, no guarding, no rebound                 tenderness   Genitalia:    Deferred   Extremities:   Moves all extremities well, no edema, no cyanosis, no              redness   Pulses:   Pulses palpable and equal bilaterally   Skin:   No bleeding, bruising or rash   Lymph nodes:   No palpable adenopathy   Neurologic:   Cranial nerves 2 - 12 grossly intact, sensation intact, DTR        present and equal bilaterally       Discharge Disposition  Home or Self Care    Discharge Medications     Discharge Medications        New Medications        Instructions Start Date   ibuprofen 600 MG tablet  Commonly known as: ADVIL,MOTRIN   600 mg, Oral, Every 6 Hours      oxyCODONE-acetaminophen 5-325 MG per tablet  Commonly known as: Percocet   1 tablet, Oral, Every 4 Hours PRN             Continue These Medications        Instructions Start Date   acyclovir 400 MG tablet  Commonly known as: ZOVIRAX   400 mg, Oral, 3 Times Daily      albuterol (2.5 MG/3ML) 0.083% nebulizer solution  Commonly known as: PROVENTIL   2.5 mg, Nebulization, Every 6 Hours PRN      promethazine 12.5 MG tablet  Commonly known as: PHENERGAN   12.5 mg, Oral, Every 6 Hours PRN      WesTab Plus 27-1 MG tablet   1 tablet, Oral, Daily               Discharge Diet:     Activity at Discharge:     Follow-up Appointments  No future  appointments.      Test Results Pending at Discharge       Jean Vera MD  08/14/23  09:40 EDT    Time:  .

## 2023-08-14 NOTE — DISCHARGE INSTRUCTIONS
Routine  instructions.  Nothing in the vagina for 6 weeks.  No driving for 1 week.  Appointment with Dr. Sims for incision check in 2 weeks.

## 2023-08-14 NOTE — OUTREACH NOTE
[facility]  IP Postpartum    Questions/Answers      Flowsheet Row Responses   Best Method for Contacting Cell   Support Person Present Yes   Does the patient have a car seat at the hospital Yes  [in car]   Delivery Note Reviewed Reviewed   Were birth expectations met? Yes   Is there a need for additional support/resources? No   Lactation Note Reviewed Reviewed   Is additional support needed? No   Any questions or concerns? No   Is the patient going to use Meds to Beds? Yes   Any concerns related discharge meds/ability to  prescriptions? No   Confirm Postpartum OB appointment Yes  [knows to schedule]   Confirm initial well-child Pediatrician appointment date/time: Yes  [knows to schedule]   Additional post-discharge F/U appointments No   Does patient have transportation to appointments? Yes   Any other assistance needed to ensure she is able to attend appointments? No   Does patient have supplies needed at home for  care? Breast Pump, Clothing, Crib, Diapers          Pt doing well, family at bedside.  Has all baby supplies.  Working with [employer] rep to help with poss eviction.  WIC set up with [facility] today.   and PP Maternal warning s/s reviewed, pt verbalized understanding.  Encouraged to reach out with any needs.  Will follow up again next week.  Sent PP packets.    Sebastian Pruett RN  Maternity Nurse Navigator    2023, 13:43 EDT

## 2023-08-14 NOTE — PLAN OF CARE
Goal Outcome Evaluation:  Plan of Care Reviewed With: patient        Progress: improving     Vital signs stable. Pain is controlled with po medication. Up ad bao in room. Voiding without difficulty.   Tolerating regular diet. Pt hopes to be discharged today.

## 2023-08-16 NOTE — PROGRESS NOTES
"Continued Stay Note  [facility]     Patient Name: Krishna Clarke  MRN: [id number]  Today's Date: 2023    Admit Date: 2023        Discharge Plan       Row Name 23 1231       Plan    Plan Comments Mother: Krishna Clarke, MRN [id number]; infant: Hillary \"Jim\" Vince, MRN [id number]. CSW consulted for \"Potential  Drug Exposure\". Of note, mother did not have a UDS collected on admission; infant's UDS was positive for fentanyl (mother had an epidural), and cord toxicology has been sent. CSW did not meet with mother prior to discharge. No concerns were noted by hospital staff. CSW will follow infant's cord toxicology, and complete mandated reporting to CPS if warranted. ALLEGRA Sandhu                   Discharge Codes    No documentation.                 Expected Discharge Date and Time       Expected Discharge Date Expected Discharge Time    Aug 14, 2023               ACE Chamorro    "

## 2023-08-18 ENCOUNTER — TELEPHONE (OUTPATIENT)
Dept: OBSTETRICS AND GYNECOLOGY | Facility: CLINIC | Age: 28
End: 2023-08-18
Payer: COMMERCIAL

## 2023-08-18 NOTE — TELEPHONE ENCOUNTER
Answering service.     PO from  Saturday   Small amount of clear discharge from incision   Suspect okay   Warnings given for infection   Has office appointment Monday   Encouraged to keep appointment.     Nnamdi Quintero MD  2023  17:15 EDT

## 2023-08-21 ENCOUNTER — PATIENT OUTREACH (OUTPATIENT)
Dept: LABOR AND DELIVERY | Facility: HOSPITAL | Age: 28
End: 2023-08-21
Payer: COMMERCIAL

## 2023-08-21 ENCOUNTER — OFFICE VISIT (OUTPATIENT)
Dept: OBSTETRICS AND GYNECOLOGY | Facility: CLINIC | Age: 28
End: 2023-08-21
Payer: COMMERCIAL

## 2023-08-21 VITALS
DIASTOLIC BLOOD PRESSURE: 83 MMHG | BODY MASS INDEX: 48.48 KG/M2 | SYSTOLIC BLOOD PRESSURE: 126 MMHG | HEART RATE: 85 BPM | WEIGHT: 291 LBS | HEIGHT: 65 IN

## 2023-08-21 DIAGNOSIS — Z48.89 ENCOUNTER FOR POSTOPERATIVE WOUND CHECK: Primary | ICD-10-CM

## 2023-08-21 PROCEDURE — 1159F MED LIST DOCD IN RCRD: CPT | Performed by: OBSTETRICS & GYNECOLOGY

## 2023-08-21 PROCEDURE — 1160F RVW MEDS BY RX/DR IN RCRD: CPT | Performed by: OBSTETRICS & GYNECOLOGY

## 2023-08-21 PROCEDURE — 99213 OFFICE O/P EST LOW 20 MIN: CPT | Performed by: OBSTETRICS & GYNECOLOGY

## 2023-08-21 NOTE — PROGRESS NOTES
"Continued Stay Note  [facility]     Patient Name: Krishna Clarke  MRN: [id number]  Today's Date: 8/21/2023    Admit Date: 8/11/2023        Discharge Plan       Row Name 08/21/23 1555       Plan    Plan Comments Mother: Krishna Clarke, MRN [id number]; infant: Hillary \"Jim\" Vince, MRN [id number]. CSW has reviewed infant's cord toxicology results and infant's cord was negative for substances. Mandated CPS reporting is not required at this time. ALLEGRA Roe.                   Discharge Codes    No documentation.                 Expected Discharge Date and Time       Expected Discharge Date Expected Discharge Time    Aug 14, 2023               ACE Huertas    "

## 2023-08-21 NOTE — PROGRESS NOTES
"Chief Complaint  Post-op- 2 wk incision check . Pt c/o shooting pain up and down spine and ends with headache if sitting a certain way.     Subjective        Krishna Clarke [facility]  History of Present Illness  Patient is a 28-year-old that presents for 2-week postoperative incision check.  She underwent a primary  at 39 weeks for failure to descend during the second stage of labor.  Her pregnancy was largely uncomplicated.  She does report today that she has pain in her spine that will shoot up to her head and caused a migraine headache with certain position changes.  She states she currently does not have a headache.  She had read online that it could be associated with a spinal headache and she has been trying caffeine to help with the headaches.  She also states she had headaches like this after the delivery of her other kids and they resolved on their own over time.  She states she is only using ibuprofen for pain.  She denies that bleeding is heavy.  She has breast and bottlefeeding.  She denies any concerns for postpartum depression or anxiety.  Objective   Vital Signs:  /83   Pulse 85   Ht 165.1 cm (65\")   Wt 132 kg (291 lb)   BMI 48.42 kg/mý   Estimated body mass index is 48.42 kg/mý as calculated from the following:    Height as of this encounter: 165.1 cm (65\").    Weight as of this encounter: 132 kg (291 lb).             Physical Exam  Vitals reviewed.   Constitutional:       Appearance: She is well-developed.   Cardiovascular:      Rate and Rhythm: Normal rate and regular rhythm.   Pulmonary:      Effort: Pulmonary effort is normal.      Breath sounds: Normal breath sounds.   Abdominal:      General: There is no distension.      Palpations: Abdomen is soft.      Tenderness: There is no abdominal tenderness. There is no guarding or rebound.      Comments: Incision intact with no redness, drainage, or tenderness     Neurological:      " Mental Status: She is alert.   Psychiatric:         Mood and Affect: Mood normal.         Behavior: Behavior normal.      Result Review :                   Assessment and Plan   Diagnoses and all orders for this visit:    1. Encounter for postoperative wound check (Primary)    Her incision is healing well.  Discussed continued postoperative wound care.  Patient's headaches do not seem classic for spinal headache since she first notices pain in her spine and then with movement the pain will radiate into her head.  I did discuss that if the headache is not improving over time or they are becoming more constant, I would recommend being evaluated in the emergency department for possible blood patch.  She will follow-up in 4 weeks for postpartum physical or sooner if needed.         Follow Up   Return in about 4 weeks (around 9/18/2023).  Patient was given instructions and counseling regarding her condition or for health maintenance advice. Please see specific information pulled into the AVS if appropriate.

## 2023-08-21 NOTE — OUTREACH NOTE
[facility]  Unable to Reach       Questions/Answers      Flowsheet Row Responses   Pending Outreach Postpartum Check-in   Call Attempt First   Outcome Left message   Next Call Attempt Date 08/22/23            Pt on her way to OB appt, req call back tomorrow.    Sebastian Pruett RN  Maternity Nurse Navigator    8/21/2023, 12:22 EDT

## 2023-08-22 ENCOUNTER — PATIENT OUTREACH (OUTPATIENT)
Dept: LABOR AND DELIVERY | Facility: HOSPITAL | Age: 28
End: 2023-08-22
Payer: COMMERCIAL

## 2023-08-22 NOTE — OUTREACH NOTE
[facility]  Postpartum Check-In    Questions/Answers      Flowsheet Row Responses   Visit Setting Telephone   Best Method for Contacting Cell   OB Discharge Note Reviewed  Reviewed   OB Discharge Navigator Reviewed  Reviewed   OB Discharge Medications Reviewed  Reviewed    discharged home with mother? No   Current Pain Levels 0-10 5   At Rest Pain Levels 0-10 5   Pain level with activity 0-10 5   Acceptable Pain Level 0-10 5   Pain Location Head   Pain Description Aching   How do you treat your pain? Medications   Verbalized Emotional State Acceptance   Family/Support Network Family, Significant Other   Level of Involvement in Care Attentive, Supportive   Do you feel comfortable in your relationship with your baby? Yes   Have members of your household adjusted to your baby? Yes   Is the baby's father supportive and/or involved with the baby? Yes   How does your partner feel about the baby? Happy, Involved   Do you feel safe at home, school and work? Yes   Are you in a relationship with someone who threatens you or hurts you? No   Do you have the resources to keep yourself and your baby healthy and safe? Yes   Lochia (per patient report) Brown-naina Red   Amount Scant   Number of pads per day 3   Lochia Odor None   Is patient breastfeeding? Yes, pumping   Breast Care Supportive Bra   pumping - Left Breast Soft, Nontender   Pumping - Right Breast Soft, Nontender   Pumping - Left Nipple Intact   Pumping - Right Nipple Intact   Frequency q2-3h   Pumping Quantity 3oz   Storage of Milk back feed   Postpartum Depression Screening Education Education Provided   Doctor Appointments: Education Provided   Breastfeeding Education Education Provided   Family Planning Education Education Provided   Postpartum Care Education Education Provided   S & S to report Education Provided   Followup Appointments Made Yes   Well Child Visit Appointments Made Yes   Appointment Date 23   Provider/Agency Bethany Espino  Child Checkup Provider Name Davy   [facility] Child Check Up Date: 23   Did you complete the visit? Yes   Were there any specific concerns? No   Umbilical Cord No reported signs or symptoms   Was the baby circumcised? Yes   Circumcision care and signs/symptoms to report Reviewed   Feeding Readiness Cues: Eager   Infant Feeding Method Breast, Expressed Breast Milk   Is a lactation referral indicated? No   Breastfeeding Right   Time breastfeeding left: 20m   Time breastfeeding right: 20m   Frequency of feedings q2-3h   Expressed milk PO (mL) 4oz total   Expressed milk- frequency of feedings q2-3h   Number of wet diapers x 24 hours 12   Last BM x 24 hours 12   What safe sleep surface is available? Bassinet   Are there stuffed animals, toys, pillows, quilts, blankets, wedges, positioners, bumpers or other loose bedding in the infant's sleeping environment? No   Where does the baby usually sleep? Bassinet   Does the baby ever share a sleep surface with a sibling, adult or pet? No   Does the baby ever share a sleep surface in a bed, couch, recliner or other? No   What position do you place your baby to sleep for naps? Back   What position do you place your baby to sleep at night Back   Are you and/or other caregivers smoking inside or outside the baby's home? No   Is the infant dressed appropiately for the temperature of the home? Yes   Do you use a clean, dry pacifier that is not attached to a string or stuffed animal? Yes            Review of Systems    Most Recent Palmer  Depression Scale Score (EPDS)    Performed by a clinician: 7 (2023 12:25 PM)    Pt doing well.  Has all baby supplies.  Breastfeeding going well.  No c/o today. Madelia and PP Maternal warning s/s reviewed, pt verbalized understanding.  Sent grad letter.  Chart to call center.    Sebastian Pruett RN  Maternity Nurse Navigator    2023, 12:40 EDT

## 2023-08-31 ENCOUNTER — TELEPHONE (OUTPATIENT)
Dept: OBSTETRICS AND GYNECOLOGY | Facility: CLINIC | Age: 28
End: 2023-08-31
Payer: COMMERCIAL

## 2023-08-31 NOTE — TELEPHONE ENCOUNTER
Provider: DR. WELLER    Caller: GANESH ESTEVEZ    Relationship to Patient: SELF    Pharmacy: [facility]    Phone Number: [phone]    Reason for Call: PT WANTS TO KNOW IF [facility] HAS SENT IN ORDER FOR HANDLESS BREAST PUMP YET. PLS CALL TO [facility] ALSO WANTS TO KNOW IF BIRTH CONTROL CONSULT IS DONE AT HER POSTPARTUM?    When was the patient last seen: 08-21-23

## 2023-08-31 NOTE — TELEPHONE ENCOUNTER
Pt informed, we rcv'd [facility] order.  Signed and faxed back to [facility] on 8/31/23.

## 2023-09-18 ENCOUNTER — POSTPARTUM VISIT (OUTPATIENT)
Dept: OBSTETRICS AND GYNECOLOGY | Facility: CLINIC | Age: 28
End: 2023-09-18
Payer: COMMERCIAL

## 2023-09-18 VITALS
DIASTOLIC BLOOD PRESSURE: 92 MMHG | HEIGHT: 65 IN | BODY MASS INDEX: 47.82 KG/M2 | HEART RATE: 70 BPM | SYSTOLIC BLOOD PRESSURE: 131 MMHG | WEIGHT: 287 LBS

## 2023-09-18 DIAGNOSIS — N89.8 VAGINAL ODOR: ICD-10-CM

## 2023-09-18 NOTE — PROGRESS NOTES
"Chief Complaint  Postpartum Care-6 wk pp exam. Pt wants Nexplanon for BC     Subjective        Krishna Clarke presents to [facility]  History of Present Illness  Patient is a 28-year-old that presents for 6-week postpartum visit.  She underwent primary low-transverse  section due to failure to progress in the second stage of labor.  Her only complaint today is that she has noticed a vaginal odor.  She states she was recently sexually active last night and did use a condom.  She is no longer having any vaginal bleeding.  She is no longer requiring pain medication.  She is breast-feeding.  She denies any concerns for postpartum depression or anxiety.  Objective   Vital Signs:  /92   Pulse 70   Ht 165.1 cm (65\")   Wt 130 kg (287 lb)   BMI 47.76 kg/m²   Estimated body mass index is 47.76 kg/m² as calculated from the following:    Height as of this encounter: 165.1 cm (65\").    Weight as of this encounter: 130 kg (287 lb).             Physical Exam  Vitals reviewed. Exam conducted with a chaperone present.   Constitutional:       Appearance: She is well-developed.   Cardiovascular:      Rate and Rhythm: Normal rate and regular rhythm.   Pulmonary:      Effort: Pulmonary effort is normal.      Breath sounds: Normal breath sounds.   Chest:   Breasts:     Right: No inverted nipple, mass, nipple discharge, skin change or tenderness.      Left: No inverted nipple, mass, nipple discharge, skin change or tenderness.   Abdominal:      General: There is no distension.      Palpations: Abdomen is soft.      Tenderness: There is no abdominal tenderness. There is no guarding or rebound.      Comments: Incision intact with no redness, drainage, or tenderness     Genitourinary:     Labia:         Right: No rash, tenderness, lesion or injury.         Left: No rash, tenderness, lesion or injury.       Vagina: Normal.      Cervix: Normal.      Uterus: Normal.       Adnexa:         " Right: No mass, tenderness or fullness.          Left: No mass, tenderness or fullness.     Neurological:      Mental Status: She is alert.   Psychiatric:         Mood and Affect: Mood normal.         Behavior: Behavior normal.      Result Review :                   Assessment and Plan   Diagnoses and all orders for this visit:    1. Routine postpartum follow-up (Primary)    2. Vaginal odor  -     NuSwab VG+ - Swab, Vagina    She is doing well postpartum and may resume all normal activities with no restrictions.  She would like to proceed with Nexplanon for contraception and she will schedule the insertion procedure on checkout.  She was advised that she cannot be sexually active for 2 weeks prior to the insertion.         Follow Up   No follow-ups on file.  Patient was given instructions and counseling regarding her condition or for health maintenance advice. Please see specific information pulled into the AVS if appropriate.

## 2023-09-20 LAB
A VAGINAE DNA VAG QL NAA+PROBE: NORMAL SCORE
BVAB2 DNA VAG QL NAA+PROBE: NORMAL SCORE
C ALBICANS DNA VAG QL NAA+PROBE: NEGATIVE
C GLABRATA DNA VAG QL NAA+PROBE: NEGATIVE
C TRACH DNA VAG QL NAA+PROBE: NEGATIVE
MEGA1 DNA VAG QL NAA+PROBE: NORMAL SCORE
N GONORRHOEA DNA VAG QL NAA+PROBE: NEGATIVE
T VAGINALIS DNA VAG QL NAA+PROBE: NEGATIVE

## 2023-10-02 ENCOUNTER — PROCEDURE VISIT (OUTPATIENT)
Dept: OBSTETRICS AND GYNECOLOGY | Facility: CLINIC | Age: 28
End: 2023-10-02
Payer: COMMERCIAL

## 2023-10-02 VITALS
SYSTOLIC BLOOD PRESSURE: 127 MMHG | WEIGHT: 283 LBS | HEART RATE: 86 BPM | DIASTOLIC BLOOD PRESSURE: 80 MMHG | HEIGHT: 65 IN | BODY MASS INDEX: 47.15 KG/M2

## 2023-10-02 DIAGNOSIS — Z30.017 NEXPLANON INSERTION: Primary | ICD-10-CM

## 2023-10-02 PROBLEM — Z98.891 S/P CESAREAN SECTION: Status: RESOLVED | Noted: 2023-08-12 | Resolved: 2023-10-02

## 2023-10-02 PROBLEM — Z87.59 HISTORY OF STILLBIRTH: Status: RESOLVED | Noted: 2023-08-11 | Resolved: 2023-10-02

## 2023-10-02 PROBLEM — B95.1 POSITIVE GBS TEST: Status: RESOLVED | Noted: 2023-07-28 | Resolved: 2023-10-02

## 2023-10-02 PROBLEM — Z86.19 HISTORY OF HERPES GENITALIS: Status: RESOLVED | Noted: 2021-07-28 | Resolved: 2023-10-02

## 2023-10-02 PROBLEM — O09.299 PREVIOUS STILLBIRTH OR DEMISE, ANTEPARTUM: Status: RESOLVED | Noted: 2022-03-30 | Resolved: 2023-10-02

## 2023-10-02 PROBLEM — O35.EXX0 PYELECTASIS OF FETUS ON PRENATAL ULTRASOUND: Status: RESOLVED | Noted: 2023-04-10 | Resolved: 2023-10-02

## 2023-10-02 RX ORDER — ETONOGESTREL 68 MG/1
1 IMPLANT SUBCUTANEOUS ONCE
COMMUNITY
End: 2023-10-02

## 2023-10-02 NOTE — PROGRESS NOTES
CC: Nexplanon insertion     Procedure: Nexplanon insertion  Preoperative diagnosis: Desires long acting reversible contraception  Postoperative diagnosis: Same  Indications:  Patient counseled on all forms of birth control and discussed their risks, side effects, and bleeding profiles.  Patient has elected to try Nexplanon.  Anesthesia: 1% lidocaine with epinephrine  Pathology: None  Estimated blood loss: Less than 5 mL  Complications: None    Procedure in detail:    Risks, benefits, alternatives explained. All questions answered. Consents signed.  Patient placed on examined table. Nexplanon to be inserted into nondominant left arm. The area for insertion prepped with betadine in a sterile fashion. About 5 mL of 1% lidocaine with epinephrine was injected.   The insertion site was identified approximately 8-10 cm proximal to the elbow, 3-5 cm posterior to the biceps and triceps sulcus, overlying the triceps muscle. The skin at the insertion site was stretched by my thumb and index finger. Then inserted the needle tip, bevel side up, at an angle not greater than 20° to the skin surface, just until the skin was penetrated. The applicator was then lowered so that it was parallel to the arm. To minimize the chance of deep incision, the skin was tented upwards with the tip of the needle. The needle was then gently inserted to the full length. Then fixed the device in place on the arm with one hand. I then slowly and carefully retracted the needle cannula back along the length of the device after pushing the release button. The obturator was seen in the device to determine that the nexplanon had been released. Both the patient and I were easily able to feel the device under the skin. Steri-Strips were applied at the site, and the arm was gently wrapped with gauze. There were no complications. The patient tolerated the procedure well. She was given a reminder card. She was advised to use condoms as a backup method for at  least a week after insertion.

## 2023-10-11 ENCOUNTER — TELEPHONE (OUTPATIENT)
Dept: OBSTETRICS AND GYNECOLOGY | Facility: CLINIC | Age: 28
End: 2023-10-11
Payer: COMMERCIAL

## 2023-10-11 NOTE — TELEPHONE ENCOUNTER
Caller: Krishna Clarke    Relationship: Self    Best call back number: [phone] CALL ANYTIME, IT IS OKAY TO [facility].    What form or medical record are you requesting: [facility] PAPERWORK FAXED TO OFFICE ([phone]) 10/11/23.    Who is requesting this form or medical record from you: EMPLOYER    How would you like to receive the form or medical records (pick-up, mail, fax): FAX  If fax, what is the fax number: RETURN FAXED LISTED ON PAPERWORK    Timeframe paperwork needed: AS SOON AS POSSIBLE    Additional notes: PATIENT IS REQUESTING A CALL BACK FROM [employer] TO CONFIRM FMLA PAPERWORK FAXED TO OFFICE TODAY WAS RECEIVED. PATIENT NEEDS PAPERWORK FILLED OUT AND STATING IT IS OKAY TO RETURN TO WORK ON MONDAY, 10/16/23. [facility] WILL NOT SEND PAYMENTS UNTIL PAPERWORK IS RECEIVED.     PATIENT IS REQUESTING A CALL BACK TODAY, AS SOON AS POSSIBLE DUE TO NOT RECEIVING [facility] PAYMENTS.

## 2023-10-11 NOTE — TELEPHONE ENCOUNTER
Returned patient call x2 regarding FMLA. Can we send letter to employer or do they additional paperwork completed?? I need to confirm 8 weeks afer c/s.

## 2023-10-24 NOTE — TELEPHONE ENCOUNTER
Hi Dr.Slone Flynn Keller called this afternoon and stated that she is currently 32 weeks pregnant and lives in [facility].  Her current provider is a physician with [employer], I see her records in her chart.   She also stated that she was a previous patient of [facility] about 2 years ago.   Before I make her an appt, I wanted to get your approval.       Thanks!     Rinvoq Pregnancy And Lactation Text: Based on animal studies, Rinvoq may cause embryo-fetal harm when administered to pregnant women.  The medication should not be used in pregnancy.  Breastfeeding is not recommended during treatment and for 6 days after the last dose.

## 2024-06-12 ENCOUNTER — TELEPHONE (OUTPATIENT)
Dept: OBSTETRICS AND GYNECOLOGY | Facility: CLINIC | Age: 29
End: 2024-06-12
Payer: COMMERCIAL

## 2024-06-12 NOTE — TELEPHONE ENCOUNTER
Yes, she needs to be scheduled for GYN follow-up if she is having a concern with bleeding.  She can be scheduled in the next available GYN follow-up with myself or with an NP

## 2024-06-12 NOTE — TELEPHONE ENCOUNTER
Pt called. Would like her  Or an MA call her to discuss an issue she is having with bleeding for over a month. Would you like me to schedule her for an appt, if so when can I schedule her?        Please advise  ~Melissa

## 2024-10-07 DIAGNOSIS — Z3A.28 28 WEEKS GESTATION OF PREGNANCY: ICD-10-CM

## 2024-10-08 RX ORDER — PNV,CALCIUM 72/IRON/FOLIC ACID 27 MG-1 MG
1 TABLET ORAL DAILY
Qty: 90 TABLET | Refills: 2 | OUTPATIENT
Start: 2024-10-08

## 2025-04-02 NOTE — TELEPHONE ENCOUNTER
Lm to return call to see when the headaches were worse, also if she is having any BP issues or severe headaches she will need to go to ER per Dr. Horne. SM   2 min

## 2025-05-05 ENCOUNTER — OFFICE VISIT (OUTPATIENT)
Dept: OBSTETRICS AND GYNECOLOGY | Facility: CLINIC | Age: 30
End: 2025-05-05
Payer: COMMERCIAL

## 2025-05-05 VITALS
BODY MASS INDEX: 36.65 KG/M2 | SYSTOLIC BLOOD PRESSURE: 118 MMHG | WEIGHT: 220 LBS | DIASTOLIC BLOOD PRESSURE: 83 MMHG | HEIGHT: 65 IN | HEART RATE: 57 BPM

## 2025-05-05 DIAGNOSIS — Z30.46 NEXPLANON REMOVAL: Primary | ICD-10-CM

## 2025-05-05 DIAGNOSIS — Z30.016 ENCOUNTER FOR INITIAL PRESCRIPTION OF TRANSDERMAL PATCH HORMONAL CONTRACEPTIVE DEVICE: ICD-10-CM

## 2025-05-05 PROCEDURE — 1159F MED LIST DOCD IN RCRD: CPT | Performed by: OBSTETRICS & GYNECOLOGY

## 2025-05-05 PROCEDURE — 11982 REMOVE DRUG IMPLANT DEVICE: CPT | Performed by: OBSTETRICS & GYNECOLOGY

## 2025-05-05 PROCEDURE — 1160F RVW MEDS BY RX/DR IN RCRD: CPT | Performed by: OBSTETRICS & GYNECOLOGY

## 2025-05-05 RX ORDER — ERGOCALCIFEROL 1.25 MG/1
50000 CAPSULE, LIQUID FILLED ORAL
COMMUNITY
Start: 2025-02-06

## 2025-05-05 RX ORDER — NORELGESTROMIN AND ETHINYL ESTRADIOL 35; 150 UG/MG; UG/MG
PATCH TRANSDERMAL
Qty: 9 PATCH | Refills: 3 | Status: SHIPPED | OUTPATIENT
Start: 2025-05-05

## 2025-05-05 RX ORDER — SULINDAC 200 MG/1
1 TABLET ORAL EVERY 12 HOURS SCHEDULED
COMMUNITY
Start: 2025-02-06

## 2025-05-05 NOTE — PROGRESS NOTES
CC: Nexplanon removal     Procedure: Nexplanon removal  Preoperative diagnosis: Abnormal uterine bleeding  Postoperative diagnosis: Same  Indications: Patient desires removal of her Nexplanon and would like to switch to the patch.  She was advised that she can start the patch today and should use a backup method for 2 weeks.  Anesthesia: 1% lidocaine with epinephrine  Pathology: None  Estimated blood loss: Less than 5 mL  Complications: None    Procedure in detail:  The risks, benefits, and alternatives to remove the device have been discussed with the patient at length. All of her questions are answered. She is aware of the need for alternative means of contraception if desired. Verbal and written informed consent is obtained.  Timeout was performed by all team members and the patient.  Patient was doubly identified and the correct patient, procedure, and laterality (if applicable) was confirmed.  Able to easily palpate the device in the nondominant left arm. Additional imaging was not required. The device feels freely mobile and easily accessible. She was placed in the examining table in a supine position with her left arm elevated at her side. The site at the distal tip of the device is marked with a pen. The skin over this site is prepped with Betadine. 5 mL of 1% lidocaine with epinephrine was injected.  Downward pressure was applied on the end of the implant nearest the axilla, and a 2-3 mm incision was made in a longitudinal direction of the arm at the tip of the implant closest to the elbow. The implant was then pushed gently toward the incision until the tip was visible. The fibrous capsule was opened with blunt dissection using a hemostat. The implant was grasp with a hemostat and was easily removed intact. It measured a  full 4 cm in length.  The skin was cleansed and dried. A Steri-Strip was applied. The arm was gently wrapped with Kerlex gauze. The patient had normal strength and sensation in her left  extremity. There was no significant bleeding. There were no complications.  The patient tolerated the procedure well.  The patient was advised about proper care of the dressings. She was advised to call or return for complications such as fever, signs of infection, increased pain, or any other concerns.

## (undated) DEVICE — SUT VIC 0 CT 36IN J958H

## (undated) DEVICE — STRIP,CLOSURE,WOUND,MEDI-STRIP,1/2X4: Brand: MEDLINE

## (undated) DEVICE — SOL IRR H2O BTL 1000ML STRL

## (undated) DEVICE — ANTIBACTERIAL UNDYED BRAIDED (POLYGLACTIN 910), SYNTHETIC ABSORBABLE SUTURE: Brand: COATED VICRYL

## (undated) DEVICE — SUT MNCRYL PLS ANTIB UD 4/0 PS2 18IN

## (undated) DEVICE — 3M™ STERI-STRIP™ COMPOUND BENZOIN TINCTURE 40 BAGS/CARTON 4 CARTONS/CASE C1544: Brand: 3M™ STERI-STRIP™

## (undated) DEVICE — GLV SURG SENSICARE MICRO PF LF 6 STRL

## (undated) DEVICE — GLV SURG SENSICARE MICRO PF LF 6.5 STRL

## (undated) DEVICE — 3M(TM) TEGADERM(TM) TRANSPARENT FILM DRESSING FRAME STYLE 1627: Brand: 3M™ TEGADERM™

## (undated) DEVICE — SPONGE,LAP,18"X18",XR,ST,5/TRAY: Brand: MEDLINE